# Patient Record
Sex: MALE | Race: WHITE | NOT HISPANIC OR LATINO | URBAN - METROPOLITAN AREA
[De-identification: names, ages, dates, MRNs, and addresses within clinical notes are randomized per-mention and may not be internally consistent; named-entity substitution may affect disease eponyms.]

---

## 2017-09-10 ENCOUNTER — EMERGENCY (EMERGENCY)
Facility: HOSPITAL | Age: 62
LOS: 1 days | Discharge: HOME | End: 2017-09-10
Attending: EMERGENCY MEDICINE | Admitting: EMERGENCY MEDICINE
Payer: COMMERCIAL

## 2017-09-10 VITALS
HEART RATE: 68 BPM | HEIGHT: 69 IN | WEIGHT: 199.96 LBS | OXYGEN SATURATION: 98 % | RESPIRATION RATE: 18 BRPM | SYSTOLIC BLOOD PRESSURE: 154 MMHG | DIASTOLIC BLOOD PRESSURE: 96 MMHG | TEMPERATURE: 99 F

## 2017-09-10 VITALS
RESPIRATION RATE: 16 BRPM | TEMPERATURE: 98 F | HEART RATE: 67 BPM | SYSTOLIC BLOOD PRESSURE: 161 MMHG | OXYGEN SATURATION: 96 % | DIASTOLIC BLOOD PRESSURE: 92 MMHG

## 2017-09-10 LAB
ANION GAP SERPL CALC-SCNC: 13 MMOL/L — SIGNIFICANT CHANGE UP (ref 5–17)
BUN SERPL-MCNC: 19 MG/DL — SIGNIFICANT CHANGE UP (ref 7–23)
CALCIUM SERPL-MCNC: 9.9 MG/DL — SIGNIFICANT CHANGE UP (ref 8.5–10.1)
CHLORIDE SERPL-SCNC: 110 MMOL/L — HIGH (ref 96–108)
CO2 SERPL-SCNC: 21 MMOL/L — LOW (ref 22–31)
CREAT SERPL-MCNC: 0.92 MG/DL — SIGNIFICANT CHANGE UP (ref 0.5–1.3)
GLUCOSE SERPL-MCNC: 110 MG/DL — HIGH (ref 70–99)
HCT VFR BLD CALC: 45.7 % — SIGNIFICANT CHANGE UP (ref 39–50)
HGB BLD-MCNC: 15.4 G/DL — SIGNIFICANT CHANGE UP (ref 13–17)
MCHC RBC-ENTMCNC: 29.9 PG — SIGNIFICANT CHANGE UP (ref 27–34)
MCHC RBC-ENTMCNC: 33.8 GM/DL — SIGNIFICANT CHANGE UP (ref 32–36)
MCV RBC AUTO: 88.4 FL — SIGNIFICANT CHANGE UP (ref 80–100)
PLATELET # BLD AUTO: 266 K/UL — SIGNIFICANT CHANGE UP (ref 150–400)
POTASSIUM SERPL-MCNC: 3.9 MMOL/L — SIGNIFICANT CHANGE UP (ref 3.5–5.3)
POTASSIUM SERPL-SCNC: 3.9 MMOL/L — SIGNIFICANT CHANGE UP (ref 3.5–5.3)
RBC # BLD: 5.17 M/UL — SIGNIFICANT CHANGE UP (ref 4.2–5.8)
RBC # FLD: 12.2 % — SIGNIFICANT CHANGE UP (ref 10.3–14.5)
SODIUM SERPL-SCNC: 144 MMOL/L — SIGNIFICANT CHANGE UP (ref 135–145)
WBC # BLD: 8.3 K/UL — SIGNIFICANT CHANGE UP (ref 3.8–10.5)
WBC # FLD AUTO: 8.3 K/UL — SIGNIFICANT CHANGE UP (ref 3.8–10.5)

## 2017-09-10 PROCEDURE — 93005 ELECTROCARDIOGRAM TRACING: CPT

## 2017-09-10 PROCEDURE — 96375 TX/PRO/DX INJ NEW DRUG ADDON: CPT

## 2017-09-10 PROCEDURE — 70450 CT HEAD/BRAIN W/O DYE: CPT

## 2017-09-10 PROCEDURE — 99285 EMERGENCY DEPT VISIT HI MDM: CPT

## 2017-09-10 PROCEDURE — 99284 EMERGENCY DEPT VISIT MOD MDM: CPT | Mod: 25

## 2017-09-10 PROCEDURE — 85027 COMPLETE CBC AUTOMATED: CPT

## 2017-09-10 PROCEDURE — 93010 ELECTROCARDIOGRAM REPORT: CPT

## 2017-09-10 PROCEDURE — 80048 BASIC METABOLIC PNL TOTAL CA: CPT

## 2017-09-10 PROCEDURE — 96376 TX/PRO/DX INJ SAME DRUG ADON: CPT

## 2017-09-10 PROCEDURE — 70450 CT HEAD/BRAIN W/O DYE: CPT | Mod: 26

## 2017-09-10 PROCEDURE — 96374 THER/PROPH/DIAG INJ IV PUSH: CPT

## 2017-09-10 RX ORDER — SODIUM CHLORIDE 9 MG/ML
1000 INJECTION INTRAMUSCULAR; INTRAVENOUS; SUBCUTANEOUS ONCE
Qty: 0 | Refills: 0 | Status: COMPLETED | OUTPATIENT
Start: 2017-09-10 | End: 2017-09-10

## 2017-09-10 RX ORDER — ONDANSETRON 8 MG/1
4 TABLET, FILM COATED ORAL ONCE
Qty: 0 | Refills: 0 | Status: COMPLETED | OUTPATIENT
Start: 2017-09-10 | End: 2017-09-10

## 2017-09-10 RX ORDER — FAMOTIDINE 10 MG/ML
20 INJECTION INTRAVENOUS ONCE
Qty: 0 | Refills: 0 | Status: COMPLETED | OUTPATIENT
Start: 2017-09-10 | End: 2017-09-10

## 2017-09-10 RX ADMIN — ONDANSETRON 4 MILLIGRAM(S): 8 TABLET, FILM COATED ORAL at 18:31

## 2017-09-10 RX ADMIN — ONDANSETRON 4 MILLIGRAM(S): 8 TABLET, FILM COATED ORAL at 20:06

## 2017-09-10 RX ADMIN — FAMOTIDINE 20 MILLIGRAM(S): 10 INJECTION INTRAVENOUS at 20:06

## 2017-09-10 RX ADMIN — SODIUM CHLORIDE 2000 MILLILITER(S): 9 INJECTION INTRAMUSCULAR; INTRAVENOUS; SUBCUTANEOUS at 20:06

## 2017-09-10 RX ADMIN — SODIUM CHLORIDE 2000 MILLILITER(S): 9 INJECTION INTRAMUSCULAR; INTRAVENOUS; SUBCUTANEOUS at 18:31

## 2017-09-10 NOTE — ED PROVIDER NOTE - CONSTITUTIONAL, MLM
normal... White male, well nourished, awake, alert, oriented to person, place, time/situation and in no apparent distress.

## 2017-09-10 NOTE — ED PROVIDER NOTE - OBJECTIVE STATEMENT
60 yo white male with sudden onset of dizziness and sweating while eating at diner table this evening. This was followed by syncope. Family placed patient on ground and patient awoke with nausea and vomiting x 1. No chest pain and no headache.

## 2017-09-13 DIAGNOSIS — R55 SYNCOPE AND COLLAPSE: ICD-10-CM

## 2017-09-13 DIAGNOSIS — E78.00 PURE HYPERCHOLESTEROLEMIA, UNSPECIFIED: ICD-10-CM

## 2017-09-22 ENCOUNTER — APPOINTMENT (OUTPATIENT)
Dept: CARDIOLOGY | Facility: CLINIC | Age: 62
End: 2017-09-22
Payer: COMMERCIAL

## 2017-09-22 PROCEDURE — 93880 EXTRACRANIAL BILAT STUDY: CPT

## 2017-09-25 ENCOUNTER — APPOINTMENT (OUTPATIENT)
Dept: CARDIOLOGY | Facility: CLINIC | Age: 62
End: 2017-09-25
Payer: COMMERCIAL

## 2017-09-25 PROCEDURE — 93306 TTE W/DOPPLER COMPLETE: CPT

## 2017-10-25 ENCOUNTER — APPOINTMENT (OUTPATIENT)
Dept: CARDIOLOGY | Facility: CLINIC | Age: 62
End: 2017-10-25
Payer: COMMERCIAL

## 2017-10-25 ENCOUNTER — NON-APPOINTMENT (OUTPATIENT)
Age: 62
End: 2017-10-25

## 2017-10-25 VITALS
SYSTOLIC BLOOD PRESSURE: 141 MMHG | DIASTOLIC BLOOD PRESSURE: 90 MMHG | BODY MASS INDEX: 28.88 KG/M2 | WEIGHT: 195 LBS | HEIGHT: 69 IN | OXYGEN SATURATION: 95 %

## 2017-10-25 VITALS — SYSTOLIC BLOOD PRESSURE: 150 MMHG | DIASTOLIC BLOOD PRESSURE: 90 MMHG

## 2017-10-25 DIAGNOSIS — J45.909 UNSPECIFIED ASTHMA, UNCOMPLICATED: ICD-10-CM

## 2017-10-25 PROCEDURE — 99215 OFFICE O/P EST HI 40 MIN: CPT

## 2017-10-25 PROCEDURE — 93000 ELECTROCARDIOGRAM COMPLETE: CPT

## 2017-12-12 ENCOUNTER — APPOINTMENT (OUTPATIENT)
Dept: CARDIOLOGY | Facility: CLINIC | Age: 62
End: 2017-12-12
Payer: COMMERCIAL

## 2017-12-12 PROCEDURE — 93015 CV STRESS TEST SUPVJ I&R: CPT

## 2017-12-20 ENCOUNTER — APPOINTMENT (OUTPATIENT)
Dept: CARDIOLOGY | Facility: CLINIC | Age: 62
End: 2017-12-20
Payer: COMMERCIAL

## 2017-12-20 PROCEDURE — 93268 ECG RECORD/REVIEW: CPT

## 2018-11-16 DIAGNOSIS — E78.00 PURE HYPERCHOLESTEROLEMIA, UNSPECIFIED: ICD-10-CM

## 2018-12-07 ENCOUNTER — RX RENEWAL (OUTPATIENT)
Age: 63
End: 2018-12-07

## 2018-12-20 ENCOUNTER — RECORD ABSTRACTING (OUTPATIENT)
Age: 63
End: 2018-12-20

## 2018-12-31 ENCOUNTER — APPOINTMENT (OUTPATIENT)
Dept: INTERNAL MEDICINE | Facility: CLINIC | Age: 63
End: 2018-12-31
Payer: COMMERCIAL

## 2018-12-31 ENCOUNTER — MED ADMIN CHARGE (OUTPATIENT)
Age: 63
End: 2018-12-31

## 2018-12-31 ENCOUNTER — NON-APPOINTMENT (OUTPATIENT)
Age: 63
End: 2018-12-31

## 2018-12-31 VITALS
HEART RATE: 73 BPM | HEIGHT: 69 IN | SYSTOLIC BLOOD PRESSURE: 150 MMHG | TEMPERATURE: 97.6 F | BODY MASS INDEX: 31.7 KG/M2 | DIASTOLIC BLOOD PRESSURE: 80 MMHG | WEIGHT: 214 LBS | OXYGEN SATURATION: 97 %

## 2018-12-31 DIAGNOSIS — K57.90 DIVERTICULOSIS OF INTESTINE, PART UNSPECIFIED, W/OUT PERFORATION OR ABSCESS W/OUT BLEEDING: ICD-10-CM

## 2018-12-31 DIAGNOSIS — Z87.438 PERSONAL HISTORY OF OTHER DISEASES OF MALE GENITAL ORGANS: ICD-10-CM

## 2018-12-31 DIAGNOSIS — Z83.511 FAMILY HISTORY OF GLAUCOMA: ICD-10-CM

## 2018-12-31 DIAGNOSIS — E04.1 NONTOXIC SINGLE THYROID NODULE: ICD-10-CM

## 2018-12-31 DIAGNOSIS — Z23 ENCOUNTER FOR IMMUNIZATION: ICD-10-CM

## 2018-12-31 DIAGNOSIS — K64.8 OTHER HEMORRHOIDS: ICD-10-CM

## 2018-12-31 DIAGNOSIS — Z87.898 PERSONAL HISTORY OF OTHER SPECIFIED CONDITIONS: ICD-10-CM

## 2018-12-31 DIAGNOSIS — Z78.9 OTHER SPECIFIED HEALTH STATUS: ICD-10-CM

## 2018-12-31 DIAGNOSIS — R09.89 OTHER SPECIFIED SYMPTOMS AND SIGNS INVOLVING THE CIRCULATORY AND RESPIRATORY SYSTEMS: ICD-10-CM

## 2018-12-31 DIAGNOSIS — Z80.1 FAMILY HISTORY OF MALIGNANT NEOPLASM OF TRACHEA, BRONCHUS AND LUNG: ICD-10-CM

## 2018-12-31 DIAGNOSIS — E53.8 DEFICIENCY OF OTHER SPECIFIED B GROUP VITAMINS: ICD-10-CM

## 2018-12-31 DIAGNOSIS — Z82.49 FAMILY HISTORY OF ISCHEMIC HEART DISEASE AND OTHER DISEASES OF THE CIRCULATORY SYSTEM: ICD-10-CM

## 2018-12-31 DIAGNOSIS — I65.29 OCCLUSION AND STENOSIS OF UNSPECIFIED CAROTID ARTERY: ICD-10-CM

## 2018-12-31 DIAGNOSIS — K63.5 POLYP OF COLON: ICD-10-CM

## 2018-12-31 DIAGNOSIS — R06.89 OTHER ABNORMALITIES OF BREATHING: ICD-10-CM

## 2018-12-31 PROCEDURE — 99397 PER PM REEVAL EST PAT 65+ YR: CPT | Mod: 25

## 2018-12-31 PROCEDURE — 93000 ELECTROCARDIOGRAM COMPLETE: CPT

## 2018-12-31 PROCEDURE — G0009: CPT

## 2018-12-31 PROCEDURE — 90670 PCV13 VACCINE IM: CPT

## 2018-12-31 PROCEDURE — 96372 THER/PROPH/DIAG INJ SC/IM: CPT

## 2018-12-31 RX ORDER — CYANOCOBALAMIN 1000 UG/ML
1000 INJECTION INTRAMUSCULAR; SUBCUTANEOUS
Qty: 0 | Refills: 0 | Status: COMPLETED | OUTPATIENT
Start: 2018-12-31

## 2018-12-31 RX ORDER — SERTRALINE HYDROCHLORIDE 25 MG/1
TABLET, FILM COATED ORAL
Refills: 0 | Status: COMPLETED | COMMUNITY
End: 2018-12-31

## 2018-12-31 RX ORDER — PNV NO.95/FERROUS FUM/FOLIC AC 28MG-0.8MG
TABLET ORAL
Refills: 0 | Status: ACTIVE | COMMUNITY

## 2018-12-31 RX ORDER — ATORVASTATIN CALCIUM 10 MG/1
10 TABLET, FILM COATED ORAL
Refills: 0 | Status: COMPLETED | COMMUNITY
End: 2018-12-31

## 2018-12-31 RX ORDER — UBIDECARENONE/VIT E ACET 100MG-5
CAPSULE ORAL
Refills: 0 | Status: ACTIVE | COMMUNITY

## 2018-12-31 RX ADMIN — CYANOCOBALAMIN 1 MCG/ML: 1000 INJECTION INTRAMUSCULAR; SUBCUTANEOUS at 00:00

## 2018-12-31 NOTE — HISTORY OF PRESENT ILLNESS
[FreeTextEntry1] : Pt 62 y/o present for an annual PE visit. [de-identified] : Pt presents to the office today for CPE and FBW\par Pt is feeling well with no complaints at present time\par He has been poor with diet and exercise\par He is retiring today \par

## 2018-12-31 NOTE — PLAN
[FreeTextEntry1] : reviewed FBW with pt in depth.\par Elevated PSA from 2.8 to 4.8 will follow up with his urologist\par Discussed diet and exercise as well as weight loss that is needed\par B12 is low and will have b12 inj today then start OTC b12 1000mcg daily\par Vitamin D is low start 2000 IU daily or 10,000 to 15,000 IU once weekly\par follow up again with me in 6 months time.

## 2019-03-05 ENCOUNTER — RX RENEWAL (OUTPATIENT)
Age: 64
End: 2019-03-05

## 2019-05-20 ENCOUNTER — RX RENEWAL (OUTPATIENT)
Age: 64
End: 2019-05-20

## 2019-12-20 ENCOUNTER — APPOINTMENT (OUTPATIENT)
Dept: INTERNAL MEDICINE | Facility: CLINIC | Age: 64
End: 2019-12-20
Payer: COMMERCIAL

## 2019-12-20 ENCOUNTER — NON-APPOINTMENT (OUTPATIENT)
Age: 64
End: 2019-12-20

## 2019-12-20 VITALS
HEART RATE: 76 BPM | OXYGEN SATURATION: 96 % | SYSTOLIC BLOOD PRESSURE: 148 MMHG | WEIGHT: 217 LBS | TEMPERATURE: 97.6 F | DIASTOLIC BLOOD PRESSURE: 82 MMHG | BODY MASS INDEX: 32.14 KG/M2 | HEIGHT: 69 IN

## 2019-12-20 PROCEDURE — 36415 COLL VENOUS BLD VENIPUNCTURE: CPT

## 2019-12-20 PROCEDURE — 93000 ELECTROCARDIOGRAM COMPLETE: CPT

## 2019-12-20 PROCEDURE — 99397 PER PM REEVAL EST PAT 65+ YR: CPT | Mod: 25

## 2019-12-20 RX ORDER — ATORVASTATIN CALCIUM 10 MG/1
10 TABLET, FILM COATED ORAL
Qty: 90 | Refills: 0 | Status: COMPLETED | COMMUNITY
Start: 2018-12-07 | End: 2019-12-20

## 2019-12-20 NOTE — PHYSICAL EXAM
[No Acute Distress] : no acute distress [Well Nourished] : well nourished [PERRL] : pupils equal round and reactive to light [Well-Appearing] : well-appearing [Well Developed] : well developed [Normal Sclera/Conjunctiva] : normal sclera/conjunctiva [Normal Outer Ear/Nose] : the outer ears and nose were normal in appearance [EOMI] : extraocular movements intact [No JVD] : no jugular venous distention [Normal Oropharynx] : the oropharynx was normal [No Lymphadenopathy] : no lymphadenopathy [Supple] : supple [No Respiratory Distress] : no respiratory distress  [Thyroid Normal, No Nodules] : the thyroid was normal and there were no nodules present [Normal Rate] : normal rate  [Clear to Auscultation] : lungs were clear to auscultation bilaterally [No Accessory Muscle Use] : no accessory muscle use [No Murmur] : no murmur heard [Regular Rhythm] : with a regular rhythm [Normal S1, S2] : normal S1 and S2 [No Abdominal Bruit] : a ~M bruit was not heard ~T in the abdomen [No Varicosities] : no varicosities [No Carotid Bruits] : no carotid bruits [No Extremity Clubbing/Cyanosis] : no extremity clubbing/cyanosis [No Palpable Aorta] : no palpable aorta [No Edema] : there was no peripheral edema [Pedal Pulses Present] : the pedal pulses are present [Soft] : abdomen soft [Non-distended] : non-distended [Non Tender] : non-tender [No Masses] : no abdominal mass palpated [Normal Bowel Sounds] : normal bowel sounds [No HSM] : no HSM [Normal Posterior Cervical Nodes] : no posterior cervical lymphadenopathy [No CVA Tenderness] : no CVA  tenderness [Normal Anterior Cervical Nodes] : no anterior cervical lymphadenopathy [No Joint Swelling] : no joint swelling [No Spinal Tenderness] : no spinal tenderness [Grossly Normal Strength/Tone] : grossly normal strength/tone [No Focal Deficits] : no focal deficits [No Rash] : no rash [Coordination Grossly Intact] : coordination grossly intact [Deep Tendon Reflexes (DTR)] : deep tendon reflexes were 2+ and symmetric [Normal Gait] : normal gait [Normal Affect] : the affect was normal [Normal Insight/Judgement] : insight and judgment were intact [de-identified] : sees urology

## 2019-12-20 NOTE — HISTORY OF PRESENT ILLNESS
[FreeTextEntry1] : 64 y/o male present for a physical exam.  [de-identified] : Pt presents today for CPE and FBW\par He has been feeling well overall\par he has not been taking his Lipitor as he states he gets mild muscle aches\par He has been under increased stressors and doesn’t feel as if the Zoloft is helping as much\par he has seen the urologist within the year but is looking for new urologist \par Colonoscopy up to date \par skin cancer screen done 7/19

## 2019-12-20 NOTE — PLAN
[FreeTextEntry1] : FBW done in office today\par EKG NSR @ 74 BPM \par Pt will try new statin therapy as previous on cause muscle cramps.\par He will increase Zoloft to 100 mg daily to see if this helps further with anxiety\par Improvement with diet and exercise needed as it has been poor for several months

## 2019-12-30 ENCOUNTER — FORM ENCOUNTER (OUTPATIENT)
Age: 64
End: 2019-12-30

## 2019-12-31 ENCOUNTER — OUTPATIENT (OUTPATIENT)
Dept: OUTPATIENT SERVICES | Facility: HOSPITAL | Age: 64
LOS: 1 days | End: 2019-12-31
Payer: COMMERCIAL

## 2019-12-31 ENCOUNTER — APPOINTMENT (OUTPATIENT)
Dept: RADIOLOGY | Facility: CLINIC | Age: 64
End: 2019-12-31
Payer: COMMERCIAL

## 2019-12-31 DIAGNOSIS — Z00.8 ENCOUNTER FOR OTHER GENERAL EXAMINATION: ICD-10-CM

## 2019-12-31 PROCEDURE — 71046 X-RAY EXAM CHEST 2 VIEWS: CPT | Mod: 26

## 2019-12-31 PROCEDURE — 71046 X-RAY EXAM CHEST 2 VIEWS: CPT

## 2020-01-03 LAB
25(OH)D3 SERPL-MCNC: 23.8 NG/ML
ALBUMIN SERPL ELPH-MCNC: 4.8 G/DL
ALP BLD-CCNC: 88 U/L
ALT SERPL-CCNC: 37 U/L
ANION GAP SERPL CALC-SCNC: 18 MMOL/L
APPEARANCE: CLEAR
AST SERPL-CCNC: 25 U/L
BACTERIA: NEGATIVE
BASOPHILS # BLD AUTO: 0.05 K/UL
BASOPHILS NFR BLD AUTO: 0.7 %
BILIRUB SERPL-MCNC: 0.6 MG/DL
BILIRUBIN URINE: NEGATIVE
BLOOD URINE: NEGATIVE
BUN SERPL-MCNC: 22 MG/DL
CALCIUM SERPL-MCNC: 9.8 MG/DL
CHLORIDE SERPL-SCNC: 108 MMOL/L
CHOLEST SERPL-MCNC: 264 MG/DL
CHOLEST/HDLC SERPL: 4.4 RATIO
CO2 SERPL-SCNC: 16 MMOL/L
COLOR: YELLOW
CREAT SERPL-MCNC: 0.91 MG/DL
EOSINOPHIL # BLD AUTO: 0.18 K/UL
EOSINOPHIL NFR BLD AUTO: 2.4 %
ESTIMATED AVERAGE GLUCOSE: 123 MG/DL
GLUCOSE QUALITATIVE U: NEGATIVE
GLUCOSE SERPL-MCNC: 115 MG/DL
HBA1C MFR BLD HPLC: 5.9 %
HCT VFR BLD CALC: 48.4 %
HDLC SERPL-MCNC: 60 MG/DL
HGB BLD-MCNC: 15.5 G/DL
HYALINE CASTS: 0 /LPF
IMM GRANULOCYTES NFR BLD AUTO: 0.7 %
KETONES URINE: NEGATIVE
LDLC SERPL CALC-MCNC: 157 MG/DL
LEUKOCYTE ESTERASE URINE: NEGATIVE
LYMPHOCYTES # BLD AUTO: 1.5 K/UL
LYMPHOCYTES NFR BLD AUTO: 20 %
MAN DIFF?: NORMAL
MCHC RBC-ENTMCNC: 28.9 PG
MCHC RBC-ENTMCNC: 32 GM/DL
MCV RBC AUTO: 90.3 FL
MICROSCOPIC-UA: NORMAL
MONOCYTES # BLD AUTO: 0.7 K/UL
MONOCYTES NFR BLD AUTO: 9.3 %
NEUTROPHILS # BLD AUTO: 5.01 K/UL
NEUTROPHILS NFR BLD AUTO: 66.9 %
NITRITE URINE: NEGATIVE
PH URINE: 5.5
PLATELET # BLD AUTO: 249 K/UL
POTASSIUM SERPL-SCNC: 4.5 MMOL/L
PROT SERPL-MCNC: 7.4 G/DL
PROTEIN URINE: NEGATIVE
PSA FREE FLD-MCNC: 10 %
PSA FREE SERPL-MCNC: 0.53 NG/ML
PSA SERPL-MCNC: 5.15 NG/ML
RBC # BLD: 5.36 M/UL
RBC # FLD: 13.5 %
RED BLOOD CELLS URINE: 1 /HPF
SODIUM SERPL-SCNC: 142 MMOL/L
SPECIFIC GRAVITY URINE: 1.03
SQUAMOUS EPITHELIAL CELLS: 1 /HPF
TRIGL SERPL-MCNC: 237 MG/DL
TSH SERPL-ACNC: 2.46 UIU/ML
UROBILINOGEN URINE: NORMAL
VIT B12 SERPL-MCNC: 307 PG/ML
WBC # FLD AUTO: 7.49 K/UL
WHITE BLOOD CELLS URINE: 1 /HPF

## 2020-02-18 ENCOUNTER — APPOINTMENT (OUTPATIENT)
Dept: MRI IMAGING | Facility: IMAGING CENTER | Age: 65
End: 2020-02-18
Payer: COMMERCIAL

## 2020-02-18 ENCOUNTER — OUTPATIENT (OUTPATIENT)
Dept: OUTPATIENT SERVICES | Facility: HOSPITAL | Age: 65
LOS: 1 days | End: 2020-02-18
Payer: COMMERCIAL

## 2020-02-18 DIAGNOSIS — R97.20 ELEVATED PROSTATE SPECIFIC ANTIGEN [PSA]: ICD-10-CM

## 2020-02-18 PROCEDURE — 72197 MRI PELVIS W/O & W/DYE: CPT | Mod: 26

## 2020-02-18 PROCEDURE — A9585: CPT

## 2020-02-18 PROCEDURE — 72197 MRI PELVIS W/O & W/DYE: CPT

## 2020-03-12 ENCOUNTER — APPOINTMENT (OUTPATIENT)
Dept: UROLOGY | Facility: CLINIC | Age: 65
End: 2020-03-12
Payer: COMMERCIAL

## 2020-03-12 VITALS
HEART RATE: 68 BPM | TEMPERATURE: 97.6 F | RESPIRATION RATE: 16 BRPM | DIASTOLIC BLOOD PRESSURE: 95 MMHG | OXYGEN SATURATION: 95 % | SYSTOLIC BLOOD PRESSURE: 142 MMHG | BODY MASS INDEX: 31.1 KG/M2 | WEIGHT: 210 LBS | HEIGHT: 69 IN

## 2020-03-12 DIAGNOSIS — N52.9 MALE ERECTILE DYSFUNCTION, UNSPECIFIED: ICD-10-CM

## 2020-03-12 PROCEDURE — 99203 OFFICE O/P NEW LOW 30 MIN: CPT

## 2020-03-12 RX ORDER — ALBUTEROL SULFATE 90 UG/1
INHALANT RESPIRATORY (INHALATION)
Refills: 0 | Status: ACTIVE | COMMUNITY

## 2020-03-12 RX ORDER — ALBUTEROL SULFATE 90 UG/1
108 (90 BASE) AEROSOL, METERED RESPIRATORY (INHALATION)
Refills: 0 | Status: DISCONTINUED | COMMUNITY
End: 2020-03-12

## 2020-03-12 RX ORDER — DOXAZOSIN 4 MG/1
4 TABLET ORAL
Refills: 0 | Status: DISCONTINUED | COMMUNITY
End: 2020-03-12

## 2020-03-12 NOTE — PHYSICAL EXAM
[General Appearance - Well Developed] : well developed [General Appearance - Well Nourished] : well nourished [Normal Appearance] : normal appearance [Well Groomed] : well groomed [General Appearance - In No Acute Distress] : no acute distress [Edema] : no peripheral edema [Respiration, Rhythm And Depth] : normal respiratory rhythm and effort [Exaggerated Use Of Accessory Muscles For Inspiration] : no accessory muscle use [Abdomen Soft] : soft [Abdomen Tenderness] : non-tender [Costovertebral Angle Tenderness] : no ~M costovertebral angle tenderness [Urethral Meatus] : meatus normal [Urinary Bladder Findings] : the bladder was normal on palpation [Scrotum] : the scrotum was normal [Testes Tenderness] : no tenderness of the testes [Testes Mass (___cm)] : there were no testicular masses [Prostate Tenderness] : the prostate was not tender [No Prostate Nodules] : no prostate nodules [FreeTextEntry1] : Prostate moderately enlarged. [Normal Station and Gait] : the gait and station were normal for the patient's age [] : no rash [No Focal Deficits] : no focal deficits [Oriented To Time, Place, And Person] : oriented to person, place, and time [Affect] : the affect was normal [Mood] : the mood was normal [Not Anxious] : not anxious [Inguinal Lymph Nodes Enlarged Bilaterally] : inguinal

## 2020-03-12 NOTE — HISTORY OF PRESENT ILLNESS
[FreeTextEntry1] : He is a 64-year-old man who is seen today for initial visit. His previous urologist no longer takes his insurance. He has history of urinary frequency, and nocturia. He used to be on doxazosin 1 mg because higher doses gave him dizziness. He is no longer taking it. Nocturia is 2 or 3 times. He might have sleep apnea but has not been checked recently. He has history of elevated PSA level. He has never had a prostate biopsy. In February 2020, PSA level was 6.29, free PSA 12% and the 4K. score was 24%. An MRI was done in February 2020 which showed prostate volume 45 cc and the left peripheral zone base lesion 8mm with suspicious score 4, small bilateral inguinal hernias. There is no family history of prostate cancer. His father had testicular cancer. Residual urine today was 50 cc. IPSS was 19 in the past. He has ED but not sexually active for a while.

## 2020-03-12 NOTE — REVIEW OF SYSTEMS
[Wake up at night to urinate  How many times?  ___] : wakes up to urinate [unfilled] times during the night [Slow urine stream] : slow urine stream [Interrupted urine stream] : interrupted urine stream [Anxiety] : anxiety [Depression] : depression [Negative] : Heme/Lymph [FreeTextEntry2] : frequent urination at daytime every 3-5 hrs as per pt.

## 2020-03-12 NOTE — ASSESSMENT
[FreeTextEntry1] : I think he should be checked for sleep apnea in order to improve nocturia if he needs to be on CPAP. We had a long discussion regarding his PSA level. We discussed different PSA parameters such as PSA velocity, free PSA and age-adjusted PSA level. Other markers such as 4K score, PCA3, Prostate health Index, etc were reviewed.  We also discussed observation with repeat PSA level, prostate or pelvic MRI and prostate biopsy in detail. Risks and benefits of each option were discussed and questions were answered. Patient was made aware that prostate cancer can exist at any PSA level.\par Prostate biopsy was reviewed in detail. We discussed how the procedure is performed. Preparation with oral antibiotics and Fleet enema was reviewed. Risks of biopsy especially bacteremia, UTI, sepsis, bleeding, erectile dysfunction, etc were discussed. He was made aware that biopsy may not be able to diagnose prostate cancer. Questions were answered. He will be scheduled with one of my colleagues for transperineal MRI guided fusion prostate biopsy.

## 2020-04-13 ENCOUNTER — APPOINTMENT (OUTPATIENT)
Dept: UROLOGY | Facility: CLINIC | Age: 65
End: 2020-04-13

## 2020-05-11 ENCOUNTER — LABORATORY RESULT (OUTPATIENT)
Age: 65
End: 2020-05-11

## 2020-05-11 ENCOUNTER — APPOINTMENT (OUTPATIENT)
Dept: UROLOGY | Facility: CLINIC | Age: 65
End: 2020-05-11
Payer: COMMERCIAL

## 2020-05-11 ENCOUNTER — OUTPATIENT (OUTPATIENT)
Dept: OUTPATIENT SERVICES | Facility: HOSPITAL | Age: 65
LOS: 1 days | End: 2020-05-11
Payer: COMMERCIAL

## 2020-05-11 VITALS — DIASTOLIC BLOOD PRESSURE: 95 MMHG | SYSTOLIC BLOOD PRESSURE: 179 MMHG | HEART RATE: 77 BPM

## 2020-05-11 VITALS
RESPIRATION RATE: 16 BRPM | SYSTOLIC BLOOD PRESSURE: 145 MMHG | TEMPERATURE: 97.8 F | DIASTOLIC BLOOD PRESSURE: 86 MMHG | HEART RATE: 74 BPM

## 2020-05-11 DIAGNOSIS — R35.0 FREQUENCY OF MICTURITION: ICD-10-CM

## 2020-05-11 PROCEDURE — 76942 ECHO GUIDE FOR BIOPSY: CPT | Mod: 26,59

## 2020-05-11 PROCEDURE — 55700: CPT | Mod: 22

## 2020-05-11 PROCEDURE — 76872 US TRANSRECTAL: CPT

## 2020-05-11 PROCEDURE — 76872 US TRANSRECTAL: CPT | Mod: 26

## 2020-05-11 PROCEDURE — 76942 ECHO GUIDE FOR BIOPSY: CPT | Mod: 59

## 2020-05-11 PROCEDURE — 76377 3D RENDER W/INTRP POSTPROCES: CPT | Mod: 26

## 2020-05-11 PROCEDURE — 55700: CPT

## 2020-05-14 DIAGNOSIS — R97.20 ELEVATED PROSTATE SPECIFIC ANTIGEN [PSA]: ICD-10-CM

## 2020-05-19 ENCOUNTER — APPOINTMENT (OUTPATIENT)
Dept: UROLOGY | Facility: CLINIC | Age: 65
End: 2020-05-19
Payer: COMMERCIAL

## 2020-05-19 PROCEDURE — 99214 OFFICE O/P EST MOD 30 MIN: CPT | Mod: 95

## 2020-05-19 RX ORDER — DIAZEPAM 5 MG/1
5 TABLET ORAL
Qty: 1 | Refills: 0 | Status: DISCONTINUED | COMMUNITY
Start: 2020-05-04 | End: 2020-05-19

## 2020-05-19 NOTE — ASSESSMENT
[FreeTextEntry1] : We discussed prostate cancer grading and staging system. Risk categories were discussed. Treatment options discussed included active surveillance, surgery, brachytherapy, external beam radiation therapy, Cyberknife, hormonal therapy, cryotherapy, chemotherapy, etc. Radical retropubic versus laparoscopic or robotic approaches were discussed. Postoperative care was reviewed. Risks of surgery discussed included but were not limited to bleeding, infection, injury to surrounding organs, DVT, PE, transfusion, etc. Risks of stress incontinence, bladder neck stricture, penile shortening and ED discussed. Possibility of positive margins, recurrence of prostate cancer and the need for additional treatment options such as radiation or hormonal therapy was reviewed. In case of active surveillance, follow-up schedule with PSA levels, repeat biopsy and possibly pelvic MRI was reviewed.\par Different types of radiation treatment as indicated above were discussed. Effects of radiation on the surrounding organs, urinary symptoms, ED, secondary malignancies, etc. reviewed as well. Depending on pathology report, further staging can be performed with CT and bone scans. Patient's questions were answered. If he decides to pursue treatment with our institution, he will followup shortly after deciding on which treatment option to proceed with. Obtaining a second opinion was highly recommended. CT and bone scans will be ordered for metastatic workup pending insurance approval. He will call back to schedule appointment after scans are done.

## 2020-05-19 NOTE — PHYSICAL EXAM
[General Appearance - Well Developed] : well developed [General Appearance - Well Nourished] : well nourished [Normal Appearance] : normal appearance [Well Groomed] : well groomed [General Appearance - In No Acute Distress] : no acute distress [Urethral Meatus] : meatus normal [Urinary Bladder Findings] : the bladder was normal on palpation [Scrotum] : the scrotum was normal [Testes Tenderness] : no tenderness of the testes [Testes Mass (___cm)] : there were no testicular masses [Prostate Tenderness] : the prostate was not tender [No Prostate Nodules] : no prostate nodules [FreeTextEntry1] : Prostate moderately enlarged. VIDYA done 3/2020 [Oriented To Time, Place, And Person] : oriented to person, place, and time [Affect] : the affect was normal [Mood] : the mood was normal [Not Anxious] : not anxious

## 2020-05-19 NOTE — HISTORY OF PRESENT ILLNESS
[Home] : at home, [unfilled] , at the time of the visit. [Medical Office: ([facility])___] : at the medical office located in  [Spouse] : spouse [Patient] : the patient [Self] : self [FreeTextEntry1] : He is a 64-year-old man who is seen today (via telehealth) after transperineal fusion prostate biopsy on 5/11/2020. He recovered well from the biopsy. He is here to discuss the results of the biopsy. PSA level was 6.29. Biopsy showed 2 cores [place] 3+4 from target area, one core [place] 4+3 from the right posterior base and also intraductal carcinoma. He does not have any fever or dysuria.\par \par Previous notes: His previous urologist no longer takes his insurance. He has history of urinary frequency, and nocturia. He used to be on doxazosin 1 mg because higher doses gave him dizziness. He is no longer taking it. Nocturia is 2 or 3 times. He might have sleep apnea but has not been checked recently. He has history of elevated PSA level. He has never had a prostate biopsy. In February 2020, PSA level was 6.29, free PSA 12% and the 4K. score was 24%. An MRI was done in February 2020 which showed prostate volume 45 cc and the left peripheral zone base lesion 8mm with suspicious score 4, small bilateral inguinal hernias. There is no family history of prostate cancer. His father had testicular cancer. Residual urine was 50 cc. IPSS was 19 in the past. He has ED but not sexually active for a while.

## 2020-05-22 ENCOUNTER — RESULT REVIEW (OUTPATIENT)
Age: 65
End: 2020-05-22

## 2020-05-22 ENCOUNTER — APPOINTMENT (OUTPATIENT)
Dept: CT IMAGING | Facility: IMAGING CENTER | Age: 65
End: 2020-05-22
Payer: COMMERCIAL

## 2020-05-22 ENCOUNTER — OUTPATIENT (OUTPATIENT)
Dept: OUTPATIENT SERVICES | Facility: HOSPITAL | Age: 65
LOS: 1 days | End: 2020-05-22
Payer: COMMERCIAL

## 2020-05-22 DIAGNOSIS — C61 MALIGNANT NEOPLASM OF PROSTATE: ICD-10-CM

## 2020-05-22 PROCEDURE — 74177 CT ABD & PELVIS W/CONTRAST: CPT | Mod: 26

## 2020-05-22 PROCEDURE — 74177 CT ABD & PELVIS W/CONTRAST: CPT

## 2020-06-04 ENCOUNTER — OUTPATIENT (OUTPATIENT)
Dept: OUTPATIENT SERVICES | Facility: HOSPITAL | Age: 65
LOS: 1 days | End: 2020-06-04
Payer: COMMERCIAL

## 2020-06-04 ENCOUNTER — APPOINTMENT (OUTPATIENT)
Dept: NUCLEAR MEDICINE | Facility: IMAGING CENTER | Age: 65
End: 2020-06-04
Payer: COMMERCIAL

## 2020-06-04 DIAGNOSIS — C61 MALIGNANT NEOPLASM OF PROSTATE: ICD-10-CM

## 2020-06-04 PROCEDURE — 78306 BONE IMAGING WHOLE BODY: CPT | Mod: 26

## 2020-06-04 PROCEDURE — A9561: CPT

## 2020-06-04 PROCEDURE — 78306 BONE IMAGING WHOLE BODY: CPT

## 2020-06-09 ENCOUNTER — APPOINTMENT (OUTPATIENT)
Dept: UROLOGY | Facility: CLINIC | Age: 65
End: 2020-06-09
Payer: COMMERCIAL

## 2020-06-09 DIAGNOSIS — R97.20 ELEVATED PROSTATE, SPECIFIC ANTIGEN [PSA]: ICD-10-CM

## 2020-06-09 PROCEDURE — 99214 OFFICE O/P EST MOD 30 MIN: CPT | Mod: 95

## 2020-06-09 NOTE — HISTORY OF PRESENT ILLNESS
[Home] : at home, [unfilled] , at the time of the visit. [Medical Office: ([facility])___] : at the medical office located in  [Spouse] : spouse [Verbal consent obtained from patient] : the patient, [unfilled] [FreeTextEntry1] : He is a 64-year-old man who is seen today (via telehealth) with wife after transperineal fusion prostate biopsy on 5/11/2020. CT scan was normal and bone scan showed degenerative joint disease. PSA level was 6.29. Biopsy showed 2 cores [place] 3+4 from target area, one core [place] 4+3 from the right posterior base and also intraductal carcinoma. He is voiding well, no bone or flank pain. \par \par Previous notes: His previous urologist no longer takes his insurance. He has history of urinary frequency, and nocturia. He used to be on doxazosin 1 mg because higher doses gave him dizziness. He is no longer taking it. Nocturia is 2 or 3 times. He might have sleep apnea but has not been checked recently. He has history of elevated PSA level. He has never had a prostate biopsy. In February 2020, PSA level was 6.29, free PSA 12% and the 4K. score was 24%. An MRI was done in February 2020 which showed prostate volume 45 cc and the left peripheral zone base lesion 8mm with suspicious score 4, small bilateral inguinal hernias. There is no family history of prostate cancer. His father had testicular cancer. Residual urine was 50 cc. IPSS was 19 in the past. He has ED but not sexually active for a while.

## 2020-06-09 NOTE — ASSESSMENT
[FreeTextEntry1] : He is interested in robotic surgery. CT and bone scans did not show any evidence of obvious metastasis. Pathology report has been reviewed again. At the previous visit and today we reviewed risks of surgery including but not limited to bleeding, infection, injury to surrounding organs such as the rectum, erectile dysfunction, incontinence, requiring radiation therapy, stricture formation, etc. He has reviewed his options and would like to proceed. I will have him see one of my partners to schedule robotic radical prostatectomy. Questions and concerns were discussed.

## 2020-06-09 NOTE — PHYSICAL EXAM
[General Appearance - Well Developed] : well developed [Normal Appearance] : normal appearance [General Appearance - Well Nourished] : well nourished [Well Groomed] : well groomed [General Appearance - In No Acute Distress] : no acute distress [Urethral Meatus] : meatus normal [Urinary Bladder Findings] : the bladder was normal on palpation [Scrotum] : the scrotum was normal [Testes Tenderness] : no tenderness of the testes [Testes Mass (___cm)] : there were no testicular masses [Prostate Tenderness] : the prostate was not tender [No Prostate Nodules] : no prostate nodules [FreeTextEntry1] : Genital exam and VIDYA done 3/2020. [] : no respiratory distress [Oriented To Time, Place, And Person] : oriented to person, place, and time [Affect] : the affect was normal [Mood] : the mood was normal [Not Anxious] : not anxious

## 2020-06-10 ENCOUNTER — APPOINTMENT (OUTPATIENT)
Dept: UROLOGY | Facility: CLINIC | Age: 65
End: 2020-06-10
Payer: COMMERCIAL

## 2020-06-10 VITALS
SYSTOLIC BLOOD PRESSURE: 136 MMHG | HEIGHT: 69 IN | OXYGEN SATURATION: 97 % | DIASTOLIC BLOOD PRESSURE: 87 MMHG | WEIGHT: 213 LBS | BODY MASS INDEX: 31.55 KG/M2 | HEART RATE: 78 BPM | TEMPERATURE: 96.8 F

## 2020-06-10 PROCEDURE — 99214 OFFICE O/P EST MOD 30 MIN: CPT

## 2020-06-12 NOTE — ASSESSMENT
[FreeTextEntry1] : discussed the results of the biopsy with the patient, explained the jennifer classification, low, intermediate, and high risk prostate cancer. and then explained the importance of the intraductal finding and how this is treated as high risk prostate cancer. explained what clinically localized prostate cancer is and the treatment options.\par discussed radiation therapy, including IMRT and cyberknife, and radical prostatectomy. discussed the benefits and risks of each modality. discussed robotic radical prostatectomy, explained the procedure, in hospital stay and recovery, discussed potential complications including bleeding, hematuria, injury to adjacent organs, incontinence and erectile dysfunction.\par \par patient understand and is interested in the procedure. signed the consent form and will be scheduled for surgery.\par

## 2020-06-12 NOTE — PHYSICAL EXAM
[General Appearance - Well Developed] : well developed [General Appearance - Well Nourished] : well nourished [Normal Appearance] : normal appearance [Well Groomed] : well groomed [General Appearance - In No Acute Distress] : no acute distress [Exaggerated Use Of Accessory Muscles For Inspiration] : no accessory muscle use [Normal Station and Gait] : the gait and station were normal for the patient's age [Skin Color & Pigmentation] : normal skin color and pigmentation [Oriented To Time, Place, And Person] : oriented to person, place, and time [Affect] : the affect was normal [Mood] : the mood was normal [Not Anxious] : not anxious [Edema] : no peripheral edema [Respiration, Rhythm And Depth] : normal respiratory rhythm and effort [Abdomen Soft] : soft [Abdomen Tenderness] : non-tender [Costovertebral Angle Tenderness] : no ~M costovertebral angle tenderness [Urethral Meatus] : meatus normal [Urinary Bladder Findings] : the bladder was normal on palpation [Scrotum] : the scrotum was normal [Testes Mass (___cm)] : there were no testicular masses [No Prostate Nodules] : no prostate nodules [] : no rash [No Focal Deficits] : no focal deficits [No Palpable Adenopathy] : no palpable adenopathy [FreeTextEntry1] : obese abdomen

## 2020-06-12 NOTE — HISTORY OF PRESENT ILLNESS
[None] : None [FreeTextEntry1] : 63 yo male patient recently diagnosed with prostate cancer\par seen by Dr Landry and sent for consultation and plan of treatment\par \par PSA 6.29 (2/2020)\par 4k score is 24%\par MRI: 45cc, 8mm PIRADS 4 lesion on the LT\par 5/11/2020 fusion BX:\par - 2 cores positive from targeted lesion with jennifer 3+4=7\par - 1 core positive on the RT base jennifer 4+3=7 with intraductal carcinoma\par \par patient has already discussed treatment options and is interested in radical prostatectomy

## 2020-06-16 ENCOUNTER — OUTPATIENT (OUTPATIENT)
Dept: OUTPATIENT SERVICES | Facility: HOSPITAL | Age: 65
LOS: 1 days | End: 2020-06-16
Payer: COMMERCIAL

## 2020-06-16 ENCOUNTER — RESULT REVIEW (OUTPATIENT)
Age: 65
End: 2020-06-16

## 2020-06-16 ENCOUNTER — APPOINTMENT (OUTPATIENT)
Dept: INTERNAL MEDICINE | Facility: CLINIC | Age: 65
End: 2020-06-16

## 2020-06-16 DIAGNOSIS — C61 MALIGNANT NEOPLASM OF PROSTATE: ICD-10-CM

## 2020-06-16 PROCEDURE — 88321 CONSLTJ&REPRT SLD PREP ELSWR: CPT

## 2020-06-17 DIAGNOSIS — C61 MALIGNANT NEOPLASM OF PROSTATE: ICD-10-CM

## 2020-06-23 ENCOUNTER — RESULT REVIEW (OUTPATIENT)
Age: 65
End: 2020-06-23

## 2020-06-23 ENCOUNTER — OUTPATIENT (OUTPATIENT)
Dept: OUTPATIENT SERVICES | Facility: HOSPITAL | Age: 65
LOS: 1 days | End: 2020-06-23
Payer: COMMERCIAL

## 2020-06-23 VITALS
HEART RATE: 65 BPM | DIASTOLIC BLOOD PRESSURE: 88 MMHG | RESPIRATION RATE: 16 BRPM | TEMPERATURE: 98 F | HEIGHT: 69 IN | SYSTOLIC BLOOD PRESSURE: 140 MMHG | OXYGEN SATURATION: 96 % | WEIGHT: 212.97 LBS

## 2020-06-23 DIAGNOSIS — Z98.890 OTHER SPECIFIED POSTPROCEDURAL STATES: Chronic | ICD-10-CM

## 2020-06-23 DIAGNOSIS — Z01.818 ENCOUNTER FOR OTHER PREPROCEDURAL EXAMINATION: ICD-10-CM

## 2020-06-23 DIAGNOSIS — C61 MALIGNANT NEOPLASM OF PROSTATE: ICD-10-CM

## 2020-06-23 LAB
ANION GAP SERPL CALC-SCNC: 4 MMOL/L — LOW (ref 5–17)
APPEARANCE UR: CLEAR — SIGNIFICANT CHANGE UP
APTT BLD: 31.2 SEC — SIGNIFICANT CHANGE UP (ref 27.5–36.3)
BASOPHILS # BLD AUTO: 0.03 K/UL — SIGNIFICANT CHANGE UP (ref 0–0.2)
BASOPHILS NFR BLD AUTO: 0.5 % — SIGNIFICANT CHANGE UP (ref 0–2)
BILIRUB UR-MCNC: NEGATIVE — SIGNIFICANT CHANGE UP
BUN SERPL-MCNC: 23 MG/DL — SIGNIFICANT CHANGE UP (ref 7–23)
CALCIUM SERPL-MCNC: 9.6 MG/DL — SIGNIFICANT CHANGE UP (ref 8.5–10.1)
CHLORIDE SERPL-SCNC: 110 MMOL/L — HIGH (ref 96–108)
CO2 SERPL-SCNC: 24 MMOL/L — SIGNIFICANT CHANGE UP (ref 22–31)
COLOR SPEC: YELLOW — SIGNIFICANT CHANGE UP
CREAT SERPL-MCNC: 0.88 MG/DL — SIGNIFICANT CHANGE UP (ref 0.5–1.3)
DIFF PNL FLD: NEGATIVE — SIGNIFICANT CHANGE UP
EOSINOPHIL # BLD AUTO: 0.17 K/UL — SIGNIFICANT CHANGE UP (ref 0–0.5)
EOSINOPHIL NFR BLD AUTO: 2.6 % — SIGNIFICANT CHANGE UP (ref 0–6)
GLUCOSE SERPL-MCNC: 90 MG/DL — SIGNIFICANT CHANGE UP (ref 70–99)
GLUCOSE UR QL: NEGATIVE MG/DL — SIGNIFICANT CHANGE UP
HCT VFR BLD CALC: 45.4 % — SIGNIFICANT CHANGE UP (ref 39–50)
HGB BLD-MCNC: 14.9 G/DL — SIGNIFICANT CHANGE UP (ref 13–17)
IMM GRANULOCYTES NFR BLD AUTO: 0.6 % — SIGNIFICANT CHANGE UP (ref 0–1.5)
INR BLD: 1.02 RATIO — SIGNIFICANT CHANGE UP (ref 0.88–1.16)
KETONES UR-MCNC: NEGATIVE — SIGNIFICANT CHANGE UP
LEUKOCYTE ESTERASE UR-ACNC: NEGATIVE — SIGNIFICANT CHANGE UP
LYMPHOCYTES # BLD AUTO: 1.26 K/UL — SIGNIFICANT CHANGE UP (ref 1–3.3)
LYMPHOCYTES # BLD AUTO: 18.9 % — SIGNIFICANT CHANGE UP (ref 13–44)
MCHC RBC-ENTMCNC: 29.1 PG — SIGNIFICANT CHANGE UP (ref 27–34)
MCHC RBC-ENTMCNC: 32.8 GM/DL — SIGNIFICANT CHANGE UP (ref 32–36)
MCV RBC AUTO: 88.7 FL — SIGNIFICANT CHANGE UP (ref 80–100)
MONOCYTES # BLD AUTO: 0.67 K/UL — SIGNIFICANT CHANGE UP (ref 0–0.9)
MONOCYTES NFR BLD AUTO: 10.1 % — SIGNIFICANT CHANGE UP (ref 2–14)
NEUTROPHILS # BLD AUTO: 4.49 K/UL — SIGNIFICANT CHANGE UP (ref 1.8–7.4)
NEUTROPHILS NFR BLD AUTO: 67.3 % — SIGNIFICANT CHANGE UP (ref 43–77)
NITRITE UR-MCNC: NEGATIVE — SIGNIFICANT CHANGE UP
PH UR: 5 — SIGNIFICANT CHANGE UP (ref 5–8)
PLATELET # BLD AUTO: 250 K/UL — SIGNIFICANT CHANGE UP (ref 150–400)
POTASSIUM SERPL-MCNC: 4 MMOL/L — SIGNIFICANT CHANGE UP (ref 3.5–5.3)
POTASSIUM SERPL-SCNC: 4 MMOL/L — SIGNIFICANT CHANGE UP (ref 3.5–5.3)
PROT UR-MCNC: NEGATIVE MG/DL — SIGNIFICANT CHANGE UP
PROTHROM AB SERPL-ACNC: 11.3 SEC — SIGNIFICANT CHANGE UP (ref 10–12.9)
RBC # BLD: 5.12 M/UL — SIGNIFICANT CHANGE UP (ref 4.2–5.8)
RBC # FLD: 13.2 % — SIGNIFICANT CHANGE UP (ref 10.3–14.5)
SODIUM SERPL-SCNC: 138 MMOL/L — SIGNIFICANT CHANGE UP (ref 135–145)
SP GR SPEC: 1.02 — SIGNIFICANT CHANGE UP (ref 1.01–1.02)
UROBILINOGEN FLD QL: NEGATIVE MG/DL — SIGNIFICANT CHANGE UP
WBC # BLD: 6.66 K/UL — SIGNIFICANT CHANGE UP (ref 3.8–10.5)
WBC # FLD AUTO: 6.66 K/UL — SIGNIFICANT CHANGE UP (ref 3.8–10.5)

## 2020-06-23 PROCEDURE — 85730 THROMBOPLASTIN TIME PARTIAL: CPT

## 2020-06-23 PROCEDURE — 80048 BASIC METABOLIC PNL TOTAL CA: CPT

## 2020-06-23 PROCEDURE — 36415 COLL VENOUS BLD VENIPUNCTURE: CPT

## 2020-06-23 PROCEDURE — 71046 X-RAY EXAM CHEST 2 VIEWS: CPT

## 2020-06-23 PROCEDURE — 86900 BLOOD TYPING SEROLOGIC ABO: CPT

## 2020-06-23 PROCEDURE — 71046 X-RAY EXAM CHEST 2 VIEWS: CPT | Mod: 26

## 2020-06-23 PROCEDURE — 81003 URINALYSIS AUTO W/O SCOPE: CPT

## 2020-06-23 PROCEDURE — 86901 BLOOD TYPING SEROLOGIC RH(D): CPT

## 2020-06-23 PROCEDURE — 93010 ELECTROCARDIOGRAM REPORT: CPT

## 2020-06-23 PROCEDURE — 85610 PROTHROMBIN TIME: CPT

## 2020-06-23 PROCEDURE — 87086 URINE CULTURE/COLONY COUNT: CPT

## 2020-06-23 PROCEDURE — 85025 COMPLETE CBC W/AUTO DIFF WBC: CPT

## 2020-06-23 PROCEDURE — G0463: CPT | Mod: 25

## 2020-06-23 PROCEDURE — 93005 ELECTROCARDIOGRAM TRACING: CPT

## 2020-06-23 PROCEDURE — 86850 RBC ANTIBODY SCREEN: CPT

## 2020-06-23 RX ORDER — ALBUTEROL 90 UG/1
0.5 AEROSOL, METERED ORAL
Qty: 0 | Refills: 0 | DISCHARGE

## 2020-06-23 RX ORDER — ATORVASTATIN CALCIUM 80 MG/1
1 TABLET, FILM COATED ORAL
Qty: 0 | Refills: 0 | DISCHARGE

## 2020-06-23 NOTE — H&P PST ADULT - NSICDXPASTMEDICALHX_GEN_ALL_CORE_FT
PAST MEDICAL HISTORY:  Anxiety     Asthma Dec 2019 last exacerbation; never intubated    Elevated Cholesterol     HLD (hyperlipidemia)     Prostate cancer     Seasonal allergies

## 2020-06-23 NOTE — H&P PST ADULT - ASSESSMENT
64 year old male  he presents to PST for planned robotic assisted laparoscopic radical prostatectomy     Plan:  1. PST instructions given ; NPO status instructions to be given by ASU   2. Pt instructed to take following meds : dulera and albuterol  3. Pt instructed to take routine evening medications unless indicated   4. Stop NSAIDS ( Aspirin Alev Motrin Mobic Diclofenac), herbal supplements , MVI , Vitamin fish oil 7 days prior to surgery  unless   directed by surgeon or cardiologist;   5. Medical Optimization  with KAYLEIGH Tolliver   6. EZ wash instructions given   7. Labs EKG CXR as per surgeon request   8. Pt instructed to self quarantine   9. Covid Testing scheduled Pt notified and aware  10. Pt denies covid symptoms shortness of breath fever cough

## 2020-06-23 NOTE — H&P PST ADULT - NSICDXPASTSURGICALHX_GEN_ALL_CORE_FT
PAST SURGICAL HISTORY:  Arrest of Bone Growth childhood    H/O knee surgery left repair of torn patella    Rupture, Tendon, Achilles with repair right achilles 1986

## 2020-06-23 NOTE — H&P PST ADULT - HISTORY OF PRESENT ILLNESS
64 year old male with prostate cancer; Pt said he has had elevated PSA and c/o urinary frequency; denies dysuria and hematuria; he presents to PST for planned robotic assisted laparoscopic radical prostatectomy

## 2020-06-23 NOTE — H&P PST ADULT - NSICDXFAMILYHX_GEN_ALL_CORE_FT
FAMILY HISTORY:  FH: congestive heart failure, father  FH: prostate cancer, brother  FH: testicular cancer, father

## 2020-06-23 NOTE — H&P PST ADULT - DENTITION
normal/denies loose teeth or dentures Elevate the injured extremity as instructed./Instructed patient/caregiver to follow-up with primary care physician./Verbal/written post procedure instructions were given to patient/caregiver.

## 2020-06-23 NOTE — H&P PST ADULT - NSANTHOSAYNRD_GEN_A_CORE
sleep study negative 2005/No. HERI screening performed.  STOP BANG Legend: 0-2 = LOW Risk; 3-4 = INTERMEDIATE Risk; 5-8 = HIGH Risk

## 2020-06-24 ENCOUNTER — APPOINTMENT (OUTPATIENT)
Dept: INTERNAL MEDICINE | Facility: CLINIC | Age: 65
End: 2020-06-24
Payer: COMMERCIAL

## 2020-06-24 VITALS
OXYGEN SATURATION: 97 % | HEIGHT: 69 IN | WEIGHT: 210 LBS | DIASTOLIC BLOOD PRESSURE: 78 MMHG | SYSTOLIC BLOOD PRESSURE: 110 MMHG | HEART RATE: 82 BPM | BODY MASS INDEX: 31.1 KG/M2 | TEMPERATURE: 98.5 F

## 2020-06-24 DIAGNOSIS — C61 MALIGNANT NEOPLASM OF PROSTATE: ICD-10-CM

## 2020-06-24 DIAGNOSIS — Z01.818 ENCOUNTER FOR OTHER PREPROCEDURAL EXAMINATION: ICD-10-CM

## 2020-06-24 LAB
CULTURE RESULTS: SIGNIFICANT CHANGE UP
SPECIMEN SOURCE: SIGNIFICANT CHANGE UP

## 2020-06-24 PROCEDURE — 99214 OFFICE O/P EST MOD 30 MIN: CPT

## 2020-06-24 NOTE — PHYSICAL EXAM
[No Edema] : there was no peripheral edema [Normal] : normal rate, regular rhythm, normal S1 and S2 and no murmur heard [Normal Gait] : normal gait [No Rash] : no rash [Normal Oropharynx] : the oropharynx was normal [No Lymphadenopathy] : no lymphadenopathy [Normal TMs] : both tympanic membranes were normal [Soft] : abdomen soft [Non Tender] : non-tender [Supple] : supple [Normal Bowel Sounds] : normal bowel sounds [Non-distended] : non-distended [Normal Mood] : the mood was normal [Normal Affect] : the affect was normal [de-identified] : sebaceous cyst on upper back

## 2020-06-24 NOTE — HISTORY OF PRESENT ILLNESS
[No Pertinent Cardiac History] : no history of aortic stenosis, atrial fibrillation, coronary artery disease, recent myocardial infarction, or implantable device/pacemaker [Asthma] : asthma [No Adverse Anesthesia Reaction] : no adverse anesthesia reaction in self or family member [(Patient denies any chest pain, claudication, dyspnea on exertion, orthopnea, palpitations or syncope)] : Patient denies any chest pain, claudication, dyspnea on exertion, orthopnea, palpitations or syncope [Excellent (>10 METs)] : Excellent (>10 METs) [Anti-Platelet Agents: _____] : Anti-Platelet Agents: [unfilled] [COPD] : no COPD [Sleep Apnea] : no sleep apnea [Smoker] : not a smoker [Chronic Anticoagulation] : no chronic anticoagulation [Chronic Kidney Disease] : no chronic kidney disease [Diabetes] : no diabetes [FreeTextEntry1] : Robotic radical prostatectomy [FreeTextEntry2] : 6/30/20 [FreeTextEntry3] : Dr. Jero Abbott at [facility]. Fax: [phone], [phone] [FreeTextEntry7] : EKG\par Stress test - 2017

## 2020-06-24 NOTE — ASSESSMENT
[No Further Testing Recommended] : no further testing recommended [As per surgery] : as per surgery [Patient Optimized for Surgery] : Patient optimized for surgery [Modify medications prior to procedure] : Modify medications prior to procedure [FreeTextEntry4] : Patient is a low cardiac risk for a low risk surgical procedure. No medical contraindications for planned surgery. [FreeTextEntry7] : Hold NSAID's and vitamins 1 week prior to surgery.

## 2020-06-24 NOTE — RESULTS/DATA
[] : results reviewed [de-identified] : normal [de-identified] : normal [de-identified] : normal [de-identified] : normal [de-identified] : NSR @ 61, incomplete RBBB, no ischemic changes [de-identified] : UA normal

## 2020-06-27 ENCOUNTER — OUTPATIENT (OUTPATIENT)
Dept: OUTPATIENT SERVICES | Facility: HOSPITAL | Age: 65
LOS: 1 days | End: 2020-06-27
Payer: COMMERCIAL

## 2020-06-27 DIAGNOSIS — Z11.59 ENCOUNTER FOR SCREENING FOR OTHER VIRAL DISEASES: ICD-10-CM

## 2020-06-27 DIAGNOSIS — Z98.890 OTHER SPECIFIED POSTPROCEDURAL STATES: Chronic | ICD-10-CM

## 2020-06-27 PROCEDURE — U0003: CPT

## 2020-06-28 DIAGNOSIS — Z11.59 ENCOUNTER FOR SCREENING FOR OTHER VIRAL DISEASES: ICD-10-CM

## 2020-06-28 LAB — SARS-COV-2 RNA SPEC QL NAA+PROBE: SIGNIFICANT CHANGE UP

## 2020-06-29 RX ORDER — FENTANYL CITRATE 50 UG/ML
50 INJECTION INTRAVENOUS
Refills: 0 | Status: DISCONTINUED | OUTPATIENT
Start: 2020-06-30 | End: 2020-06-30

## 2020-06-29 RX ORDER — ONDANSETRON 8 MG/1
4 TABLET, FILM COATED ORAL ONCE
Refills: 0 | Status: COMPLETED | OUTPATIENT
Start: 2020-06-30 | End: 2020-06-30

## 2020-06-29 RX ORDER — SODIUM CHLORIDE 9 MG/ML
3 INJECTION INTRAMUSCULAR; INTRAVENOUS; SUBCUTANEOUS EVERY 8 HOURS
Refills: 0 | Status: DISCONTINUED | OUTPATIENT
Start: 2020-06-30 | End: 2020-07-01

## 2020-06-29 RX ORDER — OXYCODONE HYDROCHLORIDE 5 MG/1
5 TABLET ORAL ONCE
Refills: 0 | Status: DISCONTINUED | OUTPATIENT
Start: 2020-06-30 | End: 2020-06-30

## 2020-06-29 RX ORDER — FAMOTIDINE 10 MG/ML
20 INJECTION INTRAVENOUS ONCE
Refills: 0 | Status: COMPLETED | OUTPATIENT
Start: 2020-06-30 | End: 2020-06-30

## 2020-06-29 RX ORDER — SODIUM CHLORIDE 9 MG/ML
1000 INJECTION, SOLUTION INTRAVENOUS
Refills: 0 | Status: DISCONTINUED | OUTPATIENT
Start: 2020-06-30 | End: 2020-06-30

## 2020-06-29 RX ORDER — ACETAMINOPHEN 500 MG
975 TABLET ORAL ONCE
Refills: 0 | Status: COMPLETED | OUTPATIENT
Start: 2020-06-30 | End: 2020-06-30

## 2020-06-30 ENCOUNTER — APPOINTMENT (OUTPATIENT)
Dept: UROLOGY | Facility: HOSPITAL | Age: 65
End: 2020-06-30

## 2020-06-30 ENCOUNTER — INPATIENT (INPATIENT)
Facility: HOSPITAL | Age: 65
LOS: 0 days | Discharge: HOME | DRG: 708 | End: 2020-07-01
Attending: UROLOGY | Admitting: UROLOGY
Payer: COMMERCIAL

## 2020-06-30 ENCOUNTER — RESULT REVIEW (OUTPATIENT)
Age: 65
End: 2020-06-30

## 2020-06-30 VITALS
HEIGHT: 69 IN | HEART RATE: 78 BPM | WEIGHT: 212.97 LBS | TEMPERATURE: 98 F | SYSTOLIC BLOOD PRESSURE: 150 MMHG | OXYGEN SATURATION: 98 % | DIASTOLIC BLOOD PRESSURE: 96 MMHG | RESPIRATION RATE: 16 BRPM

## 2020-06-30 DIAGNOSIS — C61 MALIGNANT NEOPLASM OF PROSTATE: ICD-10-CM

## 2020-06-30 DIAGNOSIS — Z98.890 OTHER SPECIFIED POSTPROCEDURAL STATES: Chronic | ICD-10-CM

## 2020-06-30 LAB
ANION GAP SERPL CALC-SCNC: 7 MMOL/L — SIGNIFICANT CHANGE UP (ref 5–17)
BASOPHILS # BLD AUTO: 0.03 K/UL — SIGNIFICANT CHANGE UP (ref 0–0.2)
BASOPHILS NFR BLD AUTO: 0.2 % — SIGNIFICANT CHANGE UP (ref 0–2)
BUN SERPL-MCNC: 25 MG/DL — HIGH (ref 7–23)
CALCIUM SERPL-MCNC: 8.9 MG/DL — SIGNIFICANT CHANGE UP (ref 8.5–10.1)
CHLORIDE SERPL-SCNC: 111 MMOL/L — HIGH (ref 96–108)
CO2 SERPL-SCNC: 22 MMOL/L — SIGNIFICANT CHANGE UP (ref 22–31)
CREAT SERPL-MCNC: 1.14 MG/DL — SIGNIFICANT CHANGE UP (ref 0.5–1.3)
EOSINOPHIL # BLD AUTO: 0 K/UL — SIGNIFICANT CHANGE UP (ref 0–0.5)
EOSINOPHIL NFR BLD AUTO: 0 % — SIGNIFICANT CHANGE UP (ref 0–6)
GLUCOSE SERPL-MCNC: 173 MG/DL — HIGH (ref 70–99)
HCT VFR BLD CALC: 44.6 % — SIGNIFICANT CHANGE UP (ref 39–50)
HGB BLD-MCNC: 14.5 G/DL — SIGNIFICANT CHANGE UP (ref 13–17)
IMM GRANULOCYTES NFR BLD AUTO: 0.5 % — SIGNIFICANT CHANGE UP (ref 0–1.5)
LYMPHOCYTES # BLD AUTO: 0.45 K/UL — LOW (ref 1–3.3)
LYMPHOCYTES # BLD AUTO: 2.8 % — LOW (ref 13–44)
MCHC RBC-ENTMCNC: 29.4 PG — SIGNIFICANT CHANGE UP (ref 27–34)
MCHC RBC-ENTMCNC: 32.5 GM/DL — SIGNIFICANT CHANGE UP (ref 32–36)
MCV RBC AUTO: 90.5 FL — SIGNIFICANT CHANGE UP (ref 80–100)
MONOCYTES # BLD AUTO: 0.34 K/UL — SIGNIFICANT CHANGE UP (ref 0–0.9)
MONOCYTES NFR BLD AUTO: 2.1 % — SIGNIFICANT CHANGE UP (ref 2–14)
NEUTROPHILS # BLD AUTO: 15.18 K/UL — HIGH (ref 1.8–7.4)
NEUTROPHILS NFR BLD AUTO: 94.4 % — HIGH (ref 43–77)
PLATELET # BLD AUTO: 241 K/UL — SIGNIFICANT CHANGE UP (ref 150–400)
POTASSIUM SERPL-MCNC: 4.1 MMOL/L — SIGNIFICANT CHANGE UP (ref 3.5–5.3)
POTASSIUM SERPL-SCNC: 4.1 MMOL/L — SIGNIFICANT CHANGE UP (ref 3.5–5.3)
RBC # BLD: 4.93 M/UL — SIGNIFICANT CHANGE UP (ref 4.2–5.8)
RBC # FLD: 13 % — SIGNIFICANT CHANGE UP (ref 10.3–14.5)
SODIUM SERPL-SCNC: 140 MMOL/L — SIGNIFICANT CHANGE UP (ref 135–145)
WBC # BLD: 16.08 K/UL — HIGH (ref 3.8–10.5)
WBC # FLD AUTO: 16.08 K/UL — HIGH (ref 3.8–10.5)

## 2020-06-30 PROCEDURE — 85025 COMPLETE CBC W/AUTO DIFF WBC: CPT

## 2020-06-30 PROCEDURE — 88307 TISSUE EXAM BY PATHOLOGIST: CPT | Mod: 26

## 2020-06-30 PROCEDURE — 88309 TISSUE EXAM BY PATHOLOGIST: CPT | Mod: 26

## 2020-06-30 PROCEDURE — 80048 BASIC METABOLIC PNL TOTAL CA: CPT

## 2020-06-30 PROCEDURE — 36415 COLL VENOUS BLD VENIPUNCTURE: CPT

## 2020-06-30 PROCEDURE — 86769 SARS-COV-2 COVID-19 ANTIBODY: CPT

## 2020-06-30 RX ORDER — PROCHLORPERAZINE MALEATE 5 MG
10 TABLET ORAL EVERY 6 HOURS
Refills: 0 | Status: DISCONTINUED | OUTPATIENT
Start: 2020-06-30 | End: 2020-07-01

## 2020-06-30 RX ORDER — ACETAMINOPHEN 500 MG
1000 TABLET ORAL ONCE
Refills: 0 | Status: COMPLETED | OUTPATIENT
Start: 2020-06-30 | End: 2020-06-30

## 2020-06-30 RX ORDER — OXYCODONE HYDROCHLORIDE 5 MG/1
5 TABLET ORAL EVERY 6 HOURS
Refills: 0 | Status: DISCONTINUED | OUTPATIENT
Start: 2020-06-30 | End: 2020-06-30

## 2020-06-30 RX ORDER — ONDANSETRON 8 MG/1
4 TABLET, FILM COATED ORAL EVERY 6 HOURS
Refills: 0 | Status: DISCONTINUED | OUTPATIENT
Start: 2020-06-30 | End: 2020-07-01

## 2020-06-30 RX ORDER — ALBUTEROL 90 UG/1
2 AEROSOL, METERED ORAL
Qty: 0 | Refills: 0 | DISCHARGE

## 2020-06-30 RX ORDER — PANTOPRAZOLE SODIUM 20 MG/1
40 TABLET, DELAYED RELEASE ORAL
Refills: 0 | Status: DISCONTINUED | OUTPATIENT
Start: 2020-06-30 | End: 2020-07-01

## 2020-06-30 RX ORDER — SODIUM CHLORIDE 9 MG/ML
1000 INJECTION INTRAMUSCULAR; INTRAVENOUS; SUBCUTANEOUS
Refills: 0 | Status: DISCONTINUED | OUTPATIENT
Start: 2020-06-30 | End: 2020-07-01

## 2020-06-30 RX ORDER — FAMOTIDINE 10 MG/ML
20 INJECTION INTRAVENOUS EVERY 12 HOURS
Refills: 0 | Status: DISCONTINUED | OUTPATIENT
Start: 2020-06-30 | End: 2020-07-01

## 2020-06-30 RX ORDER — PROCHLORPERAZINE MALEATE 5 MG
10 TABLET ORAL ONCE
Refills: 0 | Status: COMPLETED | OUTPATIENT
Start: 2020-06-30 | End: 2020-06-30

## 2020-06-30 RX ORDER — HEPARIN SODIUM 5000 [USP'U]/ML
5000 INJECTION INTRAVENOUS; SUBCUTANEOUS EVERY 8 HOURS
Refills: 0 | Status: DISCONTINUED | OUTPATIENT
Start: 2020-06-30 | End: 2020-07-01

## 2020-06-30 RX ORDER — ACETAMINOPHEN 500 MG
1000 TABLET ORAL ONCE
Refills: 0 | Status: COMPLETED | OUTPATIENT
Start: 2020-06-30 | End: 2020-07-01

## 2020-06-30 RX ORDER — HYDROMORPHONE HYDROCHLORIDE 2 MG/ML
1 INJECTION INTRAMUSCULAR; INTRAVENOUS; SUBCUTANEOUS EVERY 4 HOURS
Refills: 0 | Status: DISCONTINUED | OUTPATIENT
Start: 2020-06-30 | End: 2020-07-01

## 2020-06-30 RX ORDER — KETOROLAC TROMETHAMINE 30 MG/ML
15 SYRINGE (ML) INJECTION EVERY 6 HOURS
Refills: 0 | Status: DISCONTINUED | OUTPATIENT
Start: 2020-06-30 | End: 2020-07-01

## 2020-06-30 RX ORDER — PREGABALIN 225 MG/1
0 CAPSULE ORAL
Qty: 0 | Refills: 0 | DISCHARGE

## 2020-06-30 RX ORDER — SERTRALINE 25 MG/1
1 TABLET, FILM COATED ORAL
Qty: 0 | Refills: 0 | DISCHARGE

## 2020-06-30 RX ORDER — MOMETASONE FUROATE AND FORMOTEROL FUMARATE DIHYDRATE 200; 5 UG/1; UG/1
2 AEROSOL RESPIRATORY (INHALATION)
Qty: 0 | Refills: 0 | DISCHARGE

## 2020-06-30 RX ORDER — HYDRALAZINE HCL 50 MG
5 TABLET ORAL ONCE
Refills: 0 | Status: COMPLETED | OUTPATIENT
Start: 2020-06-30 | End: 2020-06-30

## 2020-06-30 RX ORDER — CEFAZOLIN SODIUM 1 G
2000 VIAL (EA) INJECTION EVERY 8 HOURS
Refills: 0 | Status: COMPLETED | OUTPATIENT
Start: 2020-06-30 | End: 2020-07-01

## 2020-06-30 RX ORDER — OXYCODONE HYDROCHLORIDE 5 MG/1
5 TABLET ORAL ONCE
Refills: 0 | Status: DISCONTINUED | OUTPATIENT
Start: 2020-06-30 | End: 2020-06-30

## 2020-06-30 RX ADMIN — OXYCODONE HYDROCHLORIDE 5 MILLIGRAM(S): 5 TABLET ORAL at 13:34

## 2020-06-30 RX ADMIN — Medication 400 MILLIGRAM(S): at 18:08

## 2020-06-30 RX ADMIN — HEPARIN SODIUM 5000 UNIT(S): 5000 INJECTION INTRAVENOUS; SUBCUTANEOUS at 21:43

## 2020-06-30 RX ADMIN — OXYCODONE HYDROCHLORIDE 5 MILLIGRAM(S): 5 TABLET ORAL at 12:45

## 2020-06-30 RX ADMIN — FENTANYL CITRATE 50 MICROGRAM(S): 50 INJECTION INTRAVENOUS at 12:32

## 2020-06-30 RX ADMIN — FAMOTIDINE 20 MILLIGRAM(S): 10 INJECTION INTRAVENOUS at 06:45

## 2020-06-30 RX ADMIN — SODIUM CHLORIDE 100 MILLILITER(S): 9 INJECTION, SOLUTION INTRAVENOUS at 12:37

## 2020-06-30 RX ADMIN — FENTANYL CITRATE 50 MICROGRAM(S): 50 INJECTION INTRAVENOUS at 13:20

## 2020-06-30 RX ADMIN — ONDANSETRON 4 MILLIGRAM(S): 8 TABLET, FILM COATED ORAL at 18:00

## 2020-06-30 RX ADMIN — Medication 5 MILLIGRAM(S): at 14:55

## 2020-06-30 RX ADMIN — ONDANSETRON 4 MILLIGRAM(S): 8 TABLET, FILM COATED ORAL at 12:47

## 2020-06-30 RX ADMIN — Medication 975 MILLIGRAM(S): at 06:45

## 2020-06-30 RX ADMIN — SODIUM CHLORIDE 3 MILLILITER(S): 9 INJECTION INTRAMUSCULAR; INTRAVENOUS; SUBCUTANEOUS at 21:20

## 2020-06-30 RX ADMIN — Medication 100 MILLIGRAM(S): at 17:29

## 2020-06-30 RX ADMIN — Medication 10 MILLIGRAM(S): at 15:18

## 2020-06-30 NOTE — ASU PATIENT PROFILE, ADULT - FALL HARM RISK TYPE OF ASSESSMENT
[FreeTextEntry1] : 47 y/o F w/\par \par 1. Hypothyroidism- Likely Hashimoto's given family hx and ultrasound findings. Pt. nonadherent for the past 3 weeks. Restart levothyroxine at 100 mcg daily. Pt. to take daily in am on empty stomach at least 30 min prior to breakfast. Will repeat TFTs  in 6-8 weeks once restaking consistently. \par \par 2. Multinodular Goiter- consistent with Hashimoto's. Thyroid U/S performed 10/2016 (see results section for full description). She currently has no symptoms of tracheal compression or obstruction. Appears to be more of a cosmetic concern. Will continue to monitor with serial ultrasounds every 12  months to reassess nodularity. Will hold off on any FNA at this time at the nodule does not appear suspicious. Will have patient send prior ultrasound reports and pathology report from prior evaluation of thyroid nodules. 
Admission

## 2020-06-30 NOTE — CHART NOTE - NSCHARTNOTEFT_GEN_A_CORE
Pt seen and examined at bedside s/p robotic radical prostatectomy earlier today.  Pt with IS at bedside understands proper usage.  ICU Vital Signs Last 24 Hrs  T(C): 36.7 (30 Jun 2020 16:30), Max: 36.7 (30 Jun 2020 16:30)  T(F): 98 (30 Jun 2020 16:30), Max: 98 (30 Jun 2020 16:30)  HR: 95 (30 Jun 2020 16:30) (69 - 95)  BP: 147/81 (30 Jun 2020 16:30) (130/87 - 174/79)  RR: 15 (30 Jun 2020 16:30) (11 - 16)  SpO2: 96% (30 Jun 2020 16:30) (93% - 100%)                      14.5   16.08 )-----------( 241      ( 30 Jun 2020 13:09 )             44.6       06-30    140  |  111<H>  |  25<H>  ----------------------------<  173<H>  4.1   |  22  |  1.14    Ca    8.9      30 Jun 2020 13:09    CV- RRR, S1, S2  Lungs- CTA b/L, no wheezing, rales and rhonchi  ABD- soft, ESTHELA draining, incisional tenderness only, non-distended.    Plan- continue current treatment, labs in am, advance as tolerated, DVT proph, GI proph.

## 2020-06-30 NOTE — CONSULT NOTE ADULT - SUBJECTIVE AND OBJECTIVE BOX
CC- s/p robotic prostatectomy    HPI:  65yo/M with PMH asthma, hyperlipidemia, recently diagnosed with prostate adenocarcinoma presented for robotic prostatectomy. Medical consult called for postop medical management.     PMH- as above  PSH- patella repair, achilles tendon repair  Soc hx- denies smoking, no alcohol  Fam hx- f had CHF, testicular ca; br- prostate ca    6/30- feels nauseous, received oxycodone earleir    Review of system- All 10 systems reviewed and is as per HPI otherwise negative.     T(C): 36.7 (06-30-20 @ 16:30), Max: 36.7 (06-30-20 @ 16:30)  HR: 95 (06-30-20 @ 16:30) (69 - 95)  BP: 147/81 (06-30-20 @ 16:30) (130/87 - 174/79)  RR: 15 (06-30-20 @ 16:30) (11 - 16)  SpO2: 96% (06-30-20 @ 16:30) (93% - 100%)  Wt(kg): --    LABS:                        14.5   16.08 )-----------( 241      ( 30 Jun 2020 13:09 )             44.6     06-30    140  |  111<H>  |  25<H>  ----------------------------<  173<H>  4.1   |  22  |  1.14    Ca    8.9      30 Jun 2020 13:09    RADIOLOGY & ADDITIONAL TESTS:      PHYSICAL EXAM:  GENERAL: NAD, well-groomed, well-developed  HEAD:  Atraumatic, Normocephalic  EYES: EOMI, PERRLA, conjunctiva and sclera clear  HEENT: Moist mucous membranes  NECK: Supple, No JVD  NERVOUS SYSTEM:  Alert & Oriented X3, Motor Strength 5/5 B/L upper and lower extremities; DTRs 2+ intact and symmetric  CHEST/LUNG: Clear to auscultation bilaterally; No rales, rhonchi, wheezing, or rubs  HEART: Regular rate and rhythm; No murmurs, rubs, or gallops  ABDOMEN: Postopt, +ESTHELA drain  GENITOURINARY- Hubbard with hematuria  EXTREMITIES:  2+ Peripheral Pulses, No clubbing, cyanosis, or edema  MUSCULOSKELTAL- No muscle tenderness, Muscle tone normal, No joint tenderness, no Joint swelling, Joint range of motion-normal  SKIN-no rash, no lesion  CNS- alert, oriented X3, non focal       Daily Height in cm: 175.26 (30 Jun 2020 06:37)      MEDICATIONS  (STANDING):  acetaminophen  IVPB .. 1000 milliGRAM(s) IV Intermittent once  ceFAZolin   IVPB 2000 milliGRAM(s) IV Intermittent every 8 hours  heparin   Injectable 5000 Unit(s) SubCutaneous every 8 hours  sodium chloride 0.9% lock flush 3 milliLiter(s) IV Push every 8 hours  sodium chloride 0.9%. 1000 milliLiter(s) (125 mL/Hr) IV Continuous <Continuous>    MEDICATIONS  (PRN):  famotidine    Tablet 20 milliGRAM(s) Oral every 12 hours PRN Dyspepsia  HYDROmorphone  Injectable 1 milliGRAM(s) IV Push every 4 hours PRN Severe Pain (7 - 10)  ondansetron Injectable 4 milliGRAM(s) IV Push every 6 hours PRN Nausea  oxyCODONE    IR 5 milliGRAM(s) Oral every 6 hours PRN Moderate Pain (4 - 6)    Assessment/Plan  #Prostate ca s/p robotic prostatectomy  Urology f/u appreciated  Hubbard in with expected postop hematuria  ESTHELA drain per urology  Pain meds- will limit narcotics due to nausea- use IV tylenol and toradol  AC by heparin SQ  OOB to ambulate  Incentive spirometry    #Nausea postop  Antiemetics prn. Avoid narcotics    #Hyperlipidemia- cont statin    #DVT proph- heparin SQ    #Dispo- thank you for consult, will follow

## 2020-06-30 NOTE — ASU PATIENT PROFILE, ADULT - NS PRO MODE OF ARRIVAL
N[facility]    Patient Name:  Zac Lovell  YOB: 1953  MRN: [id number]  Admit Date:  (Not on file)    I have been unable to reach this patient by phone.  A letter is being sent to the last known home address.      Electronically signed by:  Apolonia Linares RD  04/17/19 11:00 AM    ambulatory

## 2020-06-30 NOTE — ASU PATIENT PROFILE, ADULT - PMH
Anxiety    Asthma  Dec 2019 last exacerbation; never intubated  Elevated Cholesterol    HLD (hyperlipidemia)    Prostate cancer    Seasonal allergies

## 2020-06-30 NOTE — ASU PATIENT PROFILE, ADULT - PSH
Arrest of Bone Growth  childhood  H/O knee surgery  left repair of torn patella  Rupture, Tendon, Achilles  with repair right achilles 1986

## 2020-07-01 ENCOUNTER — TRANSCRIPTION ENCOUNTER (OUTPATIENT)
Age: 65
End: 2020-07-01

## 2020-07-01 VITALS
RESPIRATION RATE: 18 BRPM | DIASTOLIC BLOOD PRESSURE: 86 MMHG | SYSTOLIC BLOOD PRESSURE: 157 MMHG | TEMPERATURE: 99 F | HEART RATE: 90 BPM | OXYGEN SATURATION: 95 %

## 2020-07-01 LAB
ANION GAP SERPL CALC-SCNC: 6 MMOL/L — SIGNIFICANT CHANGE UP (ref 5–17)
BASOPHILS # BLD AUTO: 0.02 K/UL — SIGNIFICANT CHANGE UP (ref 0–0.2)
BASOPHILS NFR BLD AUTO: 0.2 % — SIGNIFICANT CHANGE UP (ref 0–2)
BUN SERPL-MCNC: 16 MG/DL — SIGNIFICANT CHANGE UP (ref 7–23)
CALCIUM SERPL-MCNC: 8.5 MG/DL — SIGNIFICANT CHANGE UP (ref 8.5–10.1)
CHLORIDE SERPL-SCNC: 112 MMOL/L — HIGH (ref 96–108)
CO2 SERPL-SCNC: 23 MMOL/L — SIGNIFICANT CHANGE UP (ref 22–31)
CREAT SERPL-MCNC: 0.86 MG/DL — SIGNIFICANT CHANGE UP (ref 0.5–1.3)
EOSINOPHIL # BLD AUTO: 0.01 K/UL — SIGNIFICANT CHANGE UP (ref 0–0.5)
EOSINOPHIL NFR BLD AUTO: 0.1 % — SIGNIFICANT CHANGE UP (ref 0–6)
GLUCOSE SERPL-MCNC: 126 MG/DL — HIGH (ref 70–99)
HCT VFR BLD CALC: 40.1 % — SIGNIFICANT CHANGE UP (ref 39–50)
HGB BLD-MCNC: 13.7 G/DL — SIGNIFICANT CHANGE UP (ref 13–17)
IMM GRANULOCYTES NFR BLD AUTO: 0.4 % — SIGNIFICANT CHANGE UP (ref 0–1.5)
LYMPHOCYTES # BLD AUTO: 0.84 K/UL — LOW (ref 1–3.3)
LYMPHOCYTES # BLD AUTO: 7.7 % — LOW (ref 13–44)
MCHC RBC-ENTMCNC: 30.4 PG — SIGNIFICANT CHANGE UP (ref 27–34)
MCHC RBC-ENTMCNC: 34.2 GM/DL — SIGNIFICANT CHANGE UP (ref 32–36)
MCV RBC AUTO: 88.9 FL — SIGNIFICANT CHANGE UP (ref 80–100)
MONOCYTES # BLD AUTO: 1.1 K/UL — HIGH (ref 0–0.9)
MONOCYTES NFR BLD AUTO: 10 % — SIGNIFICANT CHANGE UP (ref 2–14)
NEUTROPHILS # BLD AUTO: 8.96 K/UL — HIGH (ref 1.8–7.4)
NEUTROPHILS NFR BLD AUTO: 81.6 % — HIGH (ref 43–77)
PLATELET # BLD AUTO: 221 K/UL — SIGNIFICANT CHANGE UP (ref 150–400)
POTASSIUM SERPL-MCNC: 3.6 MMOL/L — SIGNIFICANT CHANGE UP (ref 3.5–5.3)
POTASSIUM SERPL-SCNC: 3.6 MMOL/L — SIGNIFICANT CHANGE UP (ref 3.5–5.3)
RBC # BLD: 4.51 M/UL — SIGNIFICANT CHANGE UP (ref 4.2–5.8)
RBC # FLD: 13.3 % — SIGNIFICANT CHANGE UP (ref 10.3–14.5)
SARS-COV-2 IGG SERPL QL IA: NEGATIVE — SIGNIFICANT CHANGE UP
SARS-COV-2 IGM SERPL IA-ACNC: <0.1 INDEX — SIGNIFICANT CHANGE UP
SODIUM SERPL-SCNC: 141 MMOL/L — SIGNIFICANT CHANGE UP (ref 135–145)
WBC # BLD: 10.97 K/UL — HIGH (ref 3.8–10.5)
WBC # FLD AUTO: 10.97 K/UL — HIGH (ref 3.8–10.5)

## 2020-07-01 PROCEDURE — 99232 SBSQ HOSP IP/OBS MODERATE 35: CPT

## 2020-07-01 PROCEDURE — 99238 HOSP IP/OBS DSCHRG MGMT 30/<: CPT

## 2020-07-01 RX ORDER — ACETAMINOPHEN 500 MG
0 TABLET ORAL
Qty: 0 | Refills: 0 | DISCHARGE

## 2020-07-01 RX ORDER — FAMOTIDINE 10 MG/ML
1 INJECTION INTRAVENOUS
Qty: 0 | Refills: 0 | DISCHARGE
Start: 2020-07-01

## 2020-07-01 RX ORDER — FLUVASTATIN SODIUM 80 MG/1
1 TABLET, FILM COATED, EXTENDED RELEASE ORAL
Qty: 0 | Refills: 0 | DISCHARGE

## 2020-07-01 RX ADMIN — Medication 15 MILLIGRAM(S): at 07:37

## 2020-07-01 RX ADMIN — Medication 400 MILLIGRAM(S): at 00:05

## 2020-07-01 RX ADMIN — Medication 15 MILLIGRAM(S): at 15:10

## 2020-07-01 RX ADMIN — SODIUM CHLORIDE 3 MILLILITER(S): 9 INJECTION INTRAMUSCULAR; INTRAVENOUS; SUBCUTANEOUS at 13:04

## 2020-07-01 RX ADMIN — SODIUM CHLORIDE 3 MILLILITER(S): 9 INJECTION INTRAMUSCULAR; INTRAVENOUS; SUBCUTANEOUS at 05:32

## 2020-07-01 RX ADMIN — Medication 100 MILLIGRAM(S): at 00:50

## 2020-07-01 RX ADMIN — HEPARIN SODIUM 5000 UNIT(S): 5000 INJECTION INTRAVENOUS; SUBCUTANEOUS at 05:45

## 2020-07-01 RX ADMIN — PANTOPRAZOLE SODIUM 40 MILLIGRAM(S): 20 TABLET, DELAYED RELEASE ORAL at 10:26

## 2020-07-01 NOTE — DISCHARGE NOTE PROVIDER - NSDCFUADDINST_GEN_ALL_CORE_FT
may shower on 7/2/2020    Hubbard catheter to remain in place until follow up appointment on 7/8/2020

## 2020-07-01 NOTE — PROGRESS NOTE ADULT - SUBJECTIVE AND OBJECTIVE BOX
Status Post: Robotic Radical Prostatectomy    Post Operative Day #: 1    SUBJECTIVE:    Flatus: N             Bowel Movement: N  Pain (0-10): 3           Pain Control Adequate: Y  Nausea: N          Vomiting: N  Diarrhea/Constipation?  N  Chest Pain: N   SOB:  N    MEDICATIONS  (STANDING):  heparin   Injectable 5000 Unit(s) SubCutaneous every 8 hours  pantoprazole    Tablet 40 milliGRAM(s) Oral before breakfast  sodium chloride 0.9% lock flush 3 milliLiter(s) IV Push every 8 hours  sodium chloride 0.9%. 1000 milliLiter(s) (125 mL/Hr) IV Continuous <Continuous>    MEDICATIONS  (PRN):  famotidine    Tablet 20 milliGRAM(s) Oral every 12 hours PRN Dyspepsia  HYDROmorphone  Injectable 1 milliGRAM(s) IV Push every 4 hours PRN Severe Pain (7 - 10)  ketorolac   Injectable 15 milliGRAM(s) IV Push every 6 hours PRN Moderate Pain (4 - 6)  ondansetron Injectable 4 milliGRAM(s) IV Push every 6 hours PRN Nausea  prochlorperazine   Injectable 10 milliGRAM(s) IV Push every 6 hours PRN nausea/vomiting      OBJECTIVE:  Vital Signs Last 24 Hrs  T(C): 37.2 (01 Jul 2020 09:28), Max: 37.2 (01 Jul 2020 09:28)  T(F): 99 (01 Jul 2020 09:28), Max: 99 (01 Jul 2020 09:28)  HR: 90 (01 Jul 2020 09:28) (69 - 95)  BP: 157/86 (01 Jul 2020 09:28) (130/87 - 174/79)  BP(mean): --  RR: 18 (01 Jul 2020 09:28) (11 - 18)  SpO2: 95% (01 Jul 2020 09:28) (93% - 100%)  I&O's Detail    30 Jun 2020 07:01  -  01 Jul 2020 07:00  --------------------------------------------------------  IN:    IV PiggyBack: 150 mL    lactated ringers.: 250 mL    Other: 900 mL    sodium chloride 0.9%.: 1500 mL  Total IN: 2800 mL    OUT:    Bulb: 115 mL    Indwelling Catheter - Urethral: 2200 mL    Other: 120 mL    Voided: 300 mL  Total OUT: 2735 mL    Total NET: 65 mL      01 Jul 2020 07:01  -  01 Jul 2020 10:57  --------------------------------------------------------  IN:    Oral Fluid: 120 mL  Total IN: 120 mL    OUT:  Total OUT: 0 mL    Total NET: 120 mL                          13.7   10.97 )-----------( 221      ( 01 Jul 2020 06:50 )             40.1     Na= 141, K= 3.6, Cl= 112, CO2= 23, BUN= 16, Cr= 0.36, Glucose= 126      PHYSICAL EXAM:      Constitutional: well developed in NAD    HEENT: NC/ AT, sclera anicteric, gross hearing intact    Respiratory: CTA B/L, no W/R/R    Cardiovascular: s1 and s2    Gastrointestinal: non-distended, hypoactive BS x 4, soft, incision sites with Dermabond, C/D/I, diffusely tender to deep palpation, ESTHELA drain to right sided of abdomen with     Genitourinary: desir catheter in place draining clear yellow urine.  no edema    Extremities: no C/C/E    Neurological: A&Ox3    Skin: warm and dry    Musculoskeletal:  FROM x 4    Psychiatric: normal affect DISCHARGE NOTE    Status Post: Robotic Radical Prostatectomy    Post Operative Day #: 1    SUBJECTIVE:    Flatus: N             Bowel Movement: N  Pain (0-10): 3           Pain Control Adequate: Y  Nausea: N          Vomiting: N  Diarrhea/Constipation?  N  Chest Pain: N   SOB:  N    MEDICATIONS  (STANDING):  heparin   Injectable 5000 Unit(s) SubCutaneous every 8 hours  pantoprazole    Tablet 40 milliGRAM(s) Oral before breakfast  sodium chloride 0.9% lock flush 3 milliLiter(s) IV Push every 8 hours  sodium chloride 0.9%. 1000 milliLiter(s) (125 mL/Hr) IV Continuous <Continuous>    MEDICATIONS  (PRN):  famotidine    Tablet 20 milliGRAM(s) Oral every 12 hours PRN Dyspepsia  HYDROmorphone  Injectable 1 milliGRAM(s) IV Push every 4 hours PRN Severe Pain (7 - 10)  ketorolac   Injectable 15 milliGRAM(s) IV Push every 6 hours PRN Moderate Pain (4 - 6)  ondansetron Injectable 4 milliGRAM(s) IV Push every 6 hours PRN Nausea  prochlorperazine   Injectable 10 milliGRAM(s) IV Push every 6 hours PRN nausea/vomiting      OBJECTIVE:  Vital Signs Last 24 Hrs  T(C): 37.2 (01 Jul 2020 09:28), Max: 37.2 (01 Jul 2020 09:28)  T(F): 99 (01 Jul 2020 09:28), Max: 99 (01 Jul 2020 09:28)  HR: 90 (01 Jul 2020 09:28) (69 - 95)  BP: 157/86 (01 Jul 2020 09:28) (130/87 - 174/79)  BP(mean): --  RR: 18 (01 Jul 2020 09:28) (11 - 18)  SpO2: 95% (01 Jul 2020 09:28) (93% - 100%)  I&O's Detail    30 Jun 2020 07:01  -  01 Jul 2020 07:00  --------------------------------------------------------  IN:    IV PiggyBack: 150 mL    lactated ringers.: 250 mL    Other: 900 mL    sodium chloride 0.9%.: 1500 mL  Total IN: 2800 mL    OUT:    Bulb: 115 mL    Indwelling Catheter - Urethral: 2200 mL    Other: 120 mL    Voided: 300 mL  Total OUT: 2735 mL    Total NET: 65 mL      01 Jul 2020 07:01  -  01 Jul 2020 10:57  --------------------------------------------------------  IN:    Oral Fluid: 120 mL  Total IN: 120 mL    OUT:  Total OUT: 0 mL    Total NET: 120 mL                          13.7   10.97 )-----------( 221      ( 01 Jul 2020 06:50 )             40.1     Na= 141, K= 3.6, Cl= 112, CO2= 23, BUN= 16, Cr= 0.36, Glucose= 126      PHYSICAL EXAM:      Constitutional: well developed in NAD    HEENT: NC/ AT, sclera anicteric, gross hearing intact    Respiratory: CTA B/L, no W/R/R    Cardiovascular: s1 and s2    Gastrointestinal: non-distended, hypoactive BS x 4, soft, incision sites with Dermabond, C/D/I, diffusely tender to deep palpation, ESTHELA drain to right sided of abdomen with     Genitourinary: desir catheter in place draining clear yellow urine.  no edema    Extremities: no C/C/E    Neurological: A&Ox3    Skin: warm and dry    Musculoskeletal:  FROM x 4    Psychiatric: normal affect

## 2020-07-01 NOTE — PROGRESS NOTE ADULT - SUBJECTIVE AND OBJECTIVE BOX
CC- s/p robotic prostatectomy    HPI:  63yo/M with PMH asthma, hyperlipidemia, recently diagnosed with prostate adenocarcinoma presented for robotic prostatectomy. Medical consult called for postop medical management.     7/1: pt seen and examined this am. Nausea markedly improved. no sob/chest pain.     Review of system- All 10 systems reviewed and is as per HPI otherwise negative.     Vital Signs Last 24 Hrs  T(C): 37.2 (01 Jul 2020 09:28), Max: 37.2 (01 Jul 2020 09:28)  T(F): 99 (01 Jul 2020 09:28), Max: 99 (01 Jul 2020 09:28)  HR: 90 (01 Jul 2020 09:28) (79 - 95)  BP: 157/86 (01 Jul 2020 09:28) (134/74 - 158/82)  RR: 18 (01 Jul 2020 09:28) (16 - 18)  SpO2: 95% (01 Jul 2020 09:28) (95% - 97%)    PHYSICAL EXAM:    GENERAL: Comfortable, no acute distress   HEAD:  Normocephalic, atraumatic  EYES: EOMI, PERRLA  HEENT: Moist mucous membranes  NECK: Supple, No JVD  NERVOUS SYSTEM:  Alert & Oriented X3, Motor Strength 5/5 B/L upper and lower extremities  CHEST/LUNG: Clear to auscultation bilaterally  HEART: Regular rate and rhythm  ABDOMEN: Soft, non distended, mildly tender. +JOSE. Dressing intact.  GENITOURINARY: Voiding, no palpable bladder  EXTREMITIES:   No clubbing, cyanosis, or edema  MUSCULOSKELETAL- No muscle tenderness, no joint tenderness  SKIN-no rash      LABS:                        13.7   10.97 )-----------( 221      ( 01 Jul 2020 06:50 )             40.1     07-01    141  |  112<H>  |  16  ----------------------------<  126<H>  3.6   |  23  |  0.86    Ca    8.5      01 Jul 2020 06:50      MEDICATIONS  (STANDING):  heparin   Injectable 5000 Unit(s) SubCutaneous every 8 hours  pantoprazole    Tablet 40 milliGRAM(s) Oral before breakfast  sodium chloride 0.9% lock flush 3 milliLiter(s) IV Push every 8 hours  sodium chloride 0.9%. 1000 milliLiter(s) (125 mL/Hr) IV Continuous <Continuous>    MEDICATIONS  (PRN):  famotidine    Tablet 20 milliGRAM(s) Oral every 12 hours PRN Dyspepsia  HYDROmorphone  Injectable 1 milliGRAM(s) IV Push every 4 hours PRN Severe Pain (7 - 10)  ketorolac   Injectable 15 milliGRAM(s) IV Push every 6 hours PRN Moderate Pain (4 - 6)  ondansetron Injectable 4 milliGRAM(s) IV Push every 6 hours PRN Nausea  prochlorperazine   Injectable 10 milliGRAM(s) IV Push every 6 hours PRN nausea/vomiting      Assessment/Plan  #Prostate ca s/p robotic prostatectomy  Urology f/u appreciated  monitor desir/jose output.  pain control.   OOB to ambulate  Incentive spirometry    #Nausea postop  -resolved.    #Hyperlipidemia  - cont statin    #DVT proph  - heparin SQ

## 2020-07-01 NOTE — PROGRESS NOTE ADULT - ASSESSMENT
POD # 1 s/p Robotic Radical Prostatectomy    -- case d/w Dr. Abbott  -- desir to gravity  -- monitor I/O's  -- analgesics  -- IVF  -- advance diet as tolerate  -- OOB and ambulate  -- DVT prophylaxis  -- Incentive spirometry  -- GI prophylaxis  -- daily labs POD # 1 s/p Robotic Radical Prostatectomy    -- case seen and d/w Dr. Abbott  -- desir to gravity  -- monitor I/O's  -- analgesics  -- IVF  -- regular diet  -- OOB and ambulate  -- DVT prophylaxis  -- Incentive spirometry  -- GI prophylaxis  -- D/C home later today  -- ESTHELA drain to be removed just prior to discharge

## 2020-07-01 NOTE — DISCHARGE NOTE PROVIDER - HOSPITAL COURSE
65 yo male POD # 1 s/p robotic radical prostatectomy.  Patient doing well today, tolerating a regular diet with no c/o N/V or abdominal pain at this time.  Hubbard catheter in place draining clear yellow urine.  Patient seen with Dr. Abbott.  As per attending, patient is stable for discharge home.  Hubbard catheter to remain in place.  ESTHELA drain to be removed just prior to discharge.

## 2020-07-01 NOTE — DISCHARGE NOTE NURSING/CASE MANAGEMENT/SOCIAL WORK - PATIENT PORTAL LINK FT
You can access the FollowMyHealth Patient Portal offered by [facility] by registering at the following website: [email or web address]. By joining [employer]’s FollowMyHealth portal, you will also be able to view your health information using other applications (apps) compatible with our system.

## 2020-07-01 NOTE — DISCHARGE NOTE PROVIDER - NSDCMRMEDTOKEN_GEN_ALL_CORE_FT
Albuterol (Eqv-Proventil HFA) 90 mcg/inh inhalation aerosol: 2 puff(s) inhaled every 12 hours  Aleve 220 mg oral tablet: 1 tab(s) orally every 8 hours, As Needed for mild to moderate pain  Dulera 50 mcg-5 mcg/inh inhalation aerosol: 2 puff(s) inhaled 2 times a day  famotidine 20 mg oral tablet: 1 tab(s) orally every 12 hours, As needed, Dyspepsia  multivitamin:   Tylenol 500 mg oral tablet: 2 tab(s) orally every 8 hours as needed for mild to moderate pain  Vitamin B12:   Zoloft 50 mg oral tablet: 1 tab(s) orally once a day

## 2020-07-06 DIAGNOSIS — Z79.51 LONG TERM (CURRENT) USE OF INHALED STEROIDS: ICD-10-CM

## 2020-07-06 DIAGNOSIS — J45.909 UNSPECIFIED ASTHMA, UNCOMPLICATED: ICD-10-CM

## 2020-07-06 DIAGNOSIS — E78.5 HYPERLIPIDEMIA, UNSPECIFIED: ICD-10-CM

## 2020-07-06 DIAGNOSIS — F41.9 ANXIETY DISORDER, UNSPECIFIED: ICD-10-CM

## 2020-07-06 DIAGNOSIS — C61 MALIGNANT NEOPLASM OF PROSTATE: ICD-10-CM

## 2020-07-06 DIAGNOSIS — Z88.5 ALLERGY STATUS TO NARCOTIC AGENT: ICD-10-CM

## 2020-07-08 ENCOUNTER — TRANSCRIPTION ENCOUNTER (OUTPATIENT)
Age: 65
End: 2020-07-08

## 2020-07-08 ENCOUNTER — APPOINTMENT (OUTPATIENT)
Dept: UROLOGY | Facility: CLINIC | Age: 65
End: 2020-07-08
Payer: COMMERCIAL

## 2020-07-08 VITALS
WEIGHT: 215 LBS | SYSTOLIC BLOOD PRESSURE: 141 MMHG | DIASTOLIC BLOOD PRESSURE: 93 MMHG | HEIGHT: 69 IN | OXYGEN SATURATION: 96 % | HEART RATE: 83 BPM | BODY MASS INDEX: 31.84 KG/M2 | TEMPERATURE: 97.6 F

## 2020-07-08 PROCEDURE — 99024 POSTOP FOLLOW-UP VISIT: CPT

## 2020-07-08 NOTE — PHYSICAL EXAM
[General Appearance - Well Developed] : well developed [Normal Appearance] : normal appearance [Well Groomed] : well groomed [General Appearance - Well Nourished] : well nourished [General Appearance - In No Acute Distress] : no acute distress [Abdomen Soft] : soft [Abdomen Tenderness] : non-tender [Costovertebral Angle Tenderness] : no ~M costovertebral angle tenderness [Urethral Meatus] : meatus normal [Urinary Bladder Findings] : the bladder was normal on palpation [Scrotum] : the scrotum was normal [Testes Mass (___cm)] : there were no testicular masses [Skin Color & Pigmentation] : normal skin color and pigmentation [Edema] : no peripheral edema [] : no respiratory distress [Respiration, Rhythm And Depth] : normal respiratory rhythm and effort [Exaggerated Use Of Accessory Muscles For Inspiration] : no accessory muscle use [Oriented To Time, Place, And Person] : oriented to person, place, and time [Affect] : the affect was normal [Mood] : the mood was normal [Not Anxious] : not anxious [Normal Station and Gait] : the gait and station were normal for the patient's age [No Focal Deficits] : no focal deficits [No Palpable Adenopathy] : no palpable adenopathy [FreeTextEntry1] : Hubbard

## 2020-07-08 NOTE — ASSESSMENT
[FreeTextEntry1] : Hubbard removed \par instructions for kegels given\par Path reviewed in detail\par Plan:\par PSA in 1 mo

## 2020-07-08 NOTE — HISTORY OF PRESENT ILLNESS
[FreeTextEntry1] : Here for visit post RALP\par Feels well\par Has no complaints\par Catheter in place for cath removal today\par clear urine\par Has normal BMs and appetite\par \par path:\par Final Diagnosis\par \par 1. 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 Lymph nodes (left pelvic):\par - Five lymph nodes negative for malignancy (0/5).\par - Multiple calcifications present.\par \par 3. Lymph nodes (right pelvic):\par - Four lymph nodes negative for malignancy (0/4).\par - Multiple calcifications present.

## 2020-07-13 PROBLEM — J45.909 UNSPECIFIED ASTHMA, UNCOMPLICATED: Chronic | Status: ACTIVE | Noted: 2020-06-23

## 2020-07-13 PROBLEM — E78.5 HYPERLIPIDEMIA, UNSPECIFIED: Chronic | Status: ACTIVE | Noted: 2020-06-23

## 2020-07-13 PROBLEM — C61 MALIGNANT NEOPLASM OF PROSTATE: Chronic | Status: ACTIVE | Noted: 2020-06-23

## 2020-07-13 PROBLEM — J30.2 OTHER SEASONAL ALLERGIC RHINITIS: Chronic | Status: ACTIVE | Noted: 2020-06-23

## 2020-07-13 PROBLEM — F41.9 ANXIETY DISORDER, UNSPECIFIED: Chronic | Status: ACTIVE | Noted: 2020-06-23

## 2020-08-04 LAB
PSA FREE FLD-MCNC: NORMAL %
PSA FREE SERPL-MCNC: <0.01 NG/ML
PSA SERPL-MCNC: <0.01 NG/ML

## 2020-08-05 ENCOUNTER — APPOINTMENT (OUTPATIENT)
Dept: UROLOGY | Facility: CLINIC | Age: 65
End: 2020-08-05
Payer: COMMERCIAL

## 2020-08-05 VITALS
HEIGHT: 69 IN | OXYGEN SATURATION: 95 % | WEIGHT: 209 LBS | SYSTOLIC BLOOD PRESSURE: 136 MMHG | BODY MASS INDEX: 30.96 KG/M2 | HEART RATE: 72 BPM | DIASTOLIC BLOOD PRESSURE: 83 MMHG | TEMPERATURE: 97 F

## 2020-08-05 PROCEDURE — 99024 POSTOP FOLLOW-UP VISIT: CPT

## 2020-08-08 NOTE — ASSESSMENT
[FreeTextEntry1] : Pt. doing well overall. \par PSA undetectable. \par Repeat PSA in 3 months. \par Next follow-up appointment in 3 months. \par

## 2020-08-08 NOTE — HISTORY OF PRESENT ILLNESS
[None] : no symptoms [FreeTextEntry1] : 65 yo presents today for a prostate cancer follow-up, s/p RALP 6.30.2020. Recent PSA <0.01 (8.3.2020).\par Pt. reports feeling well overall. No acute issues or concerns. Denies any leakage or dribbling of urine.

## 2020-08-08 NOTE — PHYSICAL EXAM
[Normal Appearance] : normal appearance [General Appearance - In No Acute Distress] : no acute distress [Abdomen Soft] : soft [Skin Color & Pigmentation] : normal skin color and pigmentation [Urethral Meatus] : meatus normal [Oriented To Time, Place, And Person] : oriented to person, place, and time [Affect] : the affect was normal [] : no respiratory distress [Edema] : no peripheral edema [Normal Station and Gait] : the gait and station were normal for the patient's age [No Focal Deficits] : no focal deficits [No Palpable Adenopathy] : no palpable adenopathy [Testes Mass (___cm)] : there were no testicular masses [Urinary Bladder Findings] : the bladder was normal on palpation [FreeTextEntry1] : abdominal incisions healed.  [Scrotum] : the scrotum was normal

## 2020-11-06 LAB — PSA SERPL-MCNC: <0.01 NG/ML

## 2020-11-11 ENCOUNTER — APPOINTMENT (OUTPATIENT)
Dept: UROLOGY | Facility: CLINIC | Age: 65
End: 2020-11-11
Payer: COMMERCIAL

## 2020-11-11 VITALS
OXYGEN SATURATION: 99 % | HEART RATE: 78 BPM | TEMPERATURE: 97 F | BODY MASS INDEX: 31.84 KG/M2 | SYSTOLIC BLOOD PRESSURE: 154 MMHG | WEIGHT: 215 LBS | HEIGHT: 69 IN | DIASTOLIC BLOOD PRESSURE: 88 MMHG

## 2020-11-11 PROCEDURE — 99213 OFFICE O/P EST LOW 20 MIN: CPT

## 2020-11-11 PROCEDURE — 99072 ADDL SUPL MATRL&STAF TM PHE: CPT

## 2020-11-11 NOTE — ASSESSMENT
[FreeTextEntry1] : 65 yo s/p RALP 6.30.2020. Patient is doing well. Most recent PSA <0.01 (11.5.20)\par PSA in 3 months\par Follow up in 3 months\par \par \par Pt seen with and case discussed with Dr. Abbott.\par

## 2020-11-11 NOTE — PHYSICAL EXAM

## 2020-11-11 NOTE — HISTORY OF PRESENT ILLNESS
[FreeTextEntry1] : 65 yo presents today for a prostate cancer follow-up, s/p RALP 6.30.2020. Patient is doing well, no urinary complaints. He is continent. Patient states he has not attempted to get an erection yet and is not interested in Viagra or Cialis at this time.\par PSA <0.01 (11.5.20)\par PSA <0.01 (8.3.20)\par

## 2020-12-22 ENCOUNTER — NON-APPOINTMENT (OUTPATIENT)
Age: 65
End: 2020-12-22

## 2020-12-22 ENCOUNTER — APPOINTMENT (OUTPATIENT)
Dept: INTERNAL MEDICINE | Facility: CLINIC | Age: 65
End: 2020-12-22
Payer: COMMERCIAL

## 2020-12-22 VITALS
BODY MASS INDEX: 31.4 KG/M2 | SYSTOLIC BLOOD PRESSURE: 140 MMHG | DIASTOLIC BLOOD PRESSURE: 80 MMHG | HEART RATE: 68 BPM | OXYGEN SATURATION: 96 % | TEMPERATURE: 98 F | HEIGHT: 69 IN | WEIGHT: 212 LBS

## 2020-12-22 DIAGNOSIS — Z23 ENCOUNTER FOR IMMUNIZATION: ICD-10-CM

## 2020-12-22 DIAGNOSIS — N40.1 BENIGN PROSTATIC HYPERPLASIA WITH LOWER URINARY TRACT SYMPMS: ICD-10-CM

## 2020-12-22 DIAGNOSIS — R35.1 BENIGN PROSTATIC HYPERPLASIA WITH LOWER URINARY TRACT SYMPMS: ICD-10-CM

## 2020-12-22 PROCEDURE — 99072 ADDL SUPL MATRL&STAF TM PHE: CPT

## 2020-12-22 PROCEDURE — G0009: CPT

## 2020-12-22 PROCEDURE — 36415 COLL VENOUS BLD VENIPUNCTURE: CPT

## 2020-12-22 PROCEDURE — 90732 PPSV23 VACC 2 YRS+ SUBQ/IM: CPT

## 2020-12-22 PROCEDURE — 93000 ELECTROCARDIOGRAM COMPLETE: CPT

## 2020-12-22 PROCEDURE — 99397 PER PM REEVAL EST PAT 65+ YR: CPT | Mod: 25

## 2020-12-22 RX ORDER — SERTRALINE HYDROCHLORIDE 100 MG/1
100 TABLET, FILM COATED ORAL
Qty: 90 | Refills: 0 | Status: COMPLETED | COMMUNITY
Start: 2019-05-20 | End: 2020-12-22

## 2020-12-22 NOTE — PLAN
[FreeTextEntry1] : Discussed the importance of communicating with me stopping medications\par Discussed the importance of STATIN therapy with high cholesterol\par Since pt was not sure if STATIN or Zoloft was making him feel "funny" he is willing to restart STATIN\par He will further improve diet and exercise \par EKG NSR @ 67 BPM \par Pt was given Pneumovax vaccination today.  Tolerated vaccination well with no acute reactions. Discussed vaccination with pt and answered all questions.\par

## 2020-12-22 NOTE — HEALTH RISK ASSESSMENT
[No falls in past year] : Patient reported no falls in the past year [Change in mental status noted] : No change in mental status noted [None] : None [With Significant Other] : lives with significant other [Retired] : retired [] :  [Fully functional (bathing, dressing, toileting, transferring, walking, feeding)] : Fully functional (bathing, dressing, toileting, transferring, walking, feeding) [Fully functional (using the telephone, shopping, preparing meals, housekeeping, doing laundry, using] : Fully functional and needs no help or supervision to perform IADLs (using the telephone, shopping, preparing meals, housekeeping, doing laundry, using transportation, managing medications and managing finances)

## 2020-12-22 NOTE — HISTORY OF PRESENT ILLNESS
[FreeTextEntry1] : 65 y/o male present today for a physical exam with fasting labs.  [de-identified] : Pt presents to the office today for CPE and FBW\par He has been feeling well overall\par He is doing well after his prostate surgery\par Diet has been fair\par exercise has been poor to fair\par He stopped taking Zoloft and Fluvastatin as he did not like the way he felt on the medications.  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

## 2020-12-30 ENCOUNTER — TRANSCRIPTION ENCOUNTER (OUTPATIENT)
Age: 65
End: 2020-12-30

## 2020-12-30 DIAGNOSIS — E87.8 OTHER DISORDERS OF ELECTROLYTE AND FLUID BALANCE, NOT ELSEWHERE CLASSIFIED: ICD-10-CM

## 2020-12-30 LAB
25(OH)D3 SERPL-MCNC: 24.4 NG/ML
ALBUMIN SERPL ELPH-MCNC: 4.8 G/DL
ALP BLD-CCNC: 118 U/L
ALT SERPL-CCNC: 50 U/L
ANION GAP SERPL CALC-SCNC: 24 MMOL/L
APPEARANCE: CLEAR
AST SERPL-CCNC: 39 U/L
BACTERIA: NEGATIVE
BASOPHILS # BLD AUTO: 0.04 K/UL
BASOPHILS NFR BLD AUTO: 0.7 %
BILIRUB SERPL-MCNC: 0.8 MG/DL
BILIRUBIN URINE: NEGATIVE
BLOOD URINE: NEGATIVE
BUN SERPL-MCNC: 21 MG/DL
CALCIUM SERPL-MCNC: 10.2 MG/DL
CHLORIDE SERPL-SCNC: 110 MMOL/L
CHOLEST SERPL-MCNC: 291 MG/DL
CO2 SERPL-SCNC: 16 MMOL/L
COLOR: YELLOW
CREAT SERPL-MCNC: 1.18 MG/DL
EOSINOPHIL # BLD AUTO: 0.13 K/UL
EOSINOPHIL NFR BLD AUTO: 2.1 %
ESTIMATED AVERAGE GLUCOSE: 120 MG/DL
GLUCOSE QUALITATIVE U: NEGATIVE
GLUCOSE SERPL-MCNC: 116 MG/DL
HBA1C MFR BLD HPLC: 5.8 %
HCT VFR BLD CALC: 49.9 %
HDLC SERPL-MCNC: 53 MG/DL
HGB BLD-MCNC: 16.2 G/DL
HYALINE CASTS: 1 /LPF
IMM GRANULOCYTES NFR BLD AUTO: 0.3 %
KETONES URINE: NEGATIVE
LDLC SERPL CALC-MCNC: 187 MG/DL
LEUKOCYTE ESTERASE URINE: NEGATIVE
LYMPHOCYTES # BLD AUTO: 1.26 K/UL
LYMPHOCYTES NFR BLD AUTO: 20.6 %
MAN DIFF?: NORMAL
MCHC RBC-ENTMCNC: 29.3 PG
MCHC RBC-ENTMCNC: 32.5 GM/DL
MCV RBC AUTO: 90.2 FL
MICROSCOPIC-UA: NORMAL
MONOCYTES # BLD AUTO: 0.61 K/UL
MONOCYTES NFR BLD AUTO: 10 %
NEUTROPHILS # BLD AUTO: 4.06 K/UL
NEUTROPHILS NFR BLD AUTO: 66.3 %
NITRITE URINE: NEGATIVE
NONHDLC SERPL-MCNC: 238 MG/DL
PH URINE: 5.5
PLATELET # BLD AUTO: 289 K/UL
POTASSIUM SERPL-SCNC: 6 MMOL/L
PROT SERPL-MCNC: 7.8 G/DL
PROTEIN URINE: NEGATIVE
RBC # BLD: 5.53 M/UL
RBC # FLD: 13 %
RED BLOOD CELLS URINE: 1 /HPF
SODIUM SERPL-SCNC: 150 MMOL/L
SPECIFIC GRAVITY URINE: 1.02
SQUAMOUS EPITHELIAL CELLS: 0 /HPF
TRIGL SERPL-MCNC: 258 MG/DL
TSH SERPL-ACNC: 1.51 UIU/ML
UROBILINOGEN URINE: NORMAL
VIT B12 SERPL-MCNC: 512 PG/ML
WBC # FLD AUTO: 6.12 K/UL
WHITE BLOOD CELLS URINE: 1 /HPF

## 2021-01-29 ENCOUNTER — APPOINTMENT (OUTPATIENT)
Dept: INTERNAL MEDICINE | Facility: CLINIC | Age: 66
End: 2021-01-29
Payer: COMMERCIAL

## 2021-01-29 VITALS
HEART RATE: 87 BPM | DIASTOLIC BLOOD PRESSURE: 80 MMHG | TEMPERATURE: 98.3 F | OXYGEN SATURATION: 96 % | SYSTOLIC BLOOD PRESSURE: 132 MMHG

## 2021-01-29 DIAGNOSIS — R09.89 OTHER SPECIFIED SYMPTOMS AND SIGNS INVOLVING THE CIRCULATORY AND RESPIRATORY SYSTEMS: ICD-10-CM

## 2021-01-29 DIAGNOSIS — Z03.818 ENCOUNTER FOR OBSERVATION FOR SUSPECTED EXPOSURE TO OTHER BIOLOGICAL AGENTS RULED OUT: ICD-10-CM

## 2021-01-29 PROCEDURE — 99214 OFFICE O/P EST MOD 30 MIN: CPT

## 2021-01-29 PROCEDURE — 99072 ADDL SUPL MATRL&STAF TM PHE: CPT

## 2021-02-01 ENCOUNTER — TRANSCRIPTION ENCOUNTER (OUTPATIENT)
Age: 66
End: 2021-02-01

## 2021-02-01 NOTE — HISTORY OF PRESENT ILLNESS
[FreeTextEntry8] : Presents to the office today with 3 days of congestion in sinuses and chest\par He has been using his Symbicort and albuterol\par Has been using over-the-counter allergy and cough medication

## 2021-02-01 NOTE — REVIEW OF SYSTEMS
[Nasal Discharge] : nasal discharge [Cough] : cough [Negative] : Cardiovascular [Fever] : fever [Fatigue] : fatigue [Shortness Of Breath] : no shortness of breath [Wheezing] : no wheezing [Headache] : headache [FreeTextEntry4] : sinus congestion

## 2021-02-01 NOTE — PLAN
[FreeTextEntry1] : Patient will start above medication and follow-up with me in 1 week if not feeling better\par Covid PCR testing done today for screening measures

## 2021-02-02 LAB — SARS-COV-2 N GENE NPH QL NAA+PROBE: DETECTED

## 2021-02-03 ENCOUNTER — APPOINTMENT (OUTPATIENT)
Dept: DISASTER EMERGENCY | Facility: HOSPITAL | Age: 66
End: 2021-02-03

## 2021-02-03 ENCOUNTER — OUTPATIENT (OUTPATIENT)
Dept: OUTPATIENT SERVICES | Facility: HOSPITAL | Age: 66
LOS: 1 days | End: 2021-02-03
Payer: COMMERCIAL

## 2021-02-03 VITALS
SYSTOLIC BLOOD PRESSURE: 130 MMHG | RESPIRATION RATE: 18 BRPM | HEIGHT: 69 IN | DIASTOLIC BLOOD PRESSURE: 85 MMHG | HEART RATE: 82 BPM | WEIGHT: 207.01 LBS | OXYGEN SATURATION: 96 % | TEMPERATURE: 98 F

## 2021-02-03 VITALS
SYSTOLIC BLOOD PRESSURE: 159 MMHG | RESPIRATION RATE: 18 BRPM | HEART RATE: 80 BPM | OXYGEN SATURATION: 96 % | TEMPERATURE: 98 F | DIASTOLIC BLOOD PRESSURE: 91 MMHG

## 2021-02-03 DIAGNOSIS — Z98.890 OTHER SPECIFIED POSTPROCEDURAL STATES: Chronic | ICD-10-CM

## 2021-02-03 DIAGNOSIS — U07.1 COVID-19: ICD-10-CM

## 2021-02-03 PROCEDURE — M0239: CPT

## 2021-02-03 RX ORDER — BAMLANIVIMAB 35 MG/ML
700 INJECTION, SOLUTION INTRAVENOUS ONCE
Refills: 0 | Status: COMPLETED | OUTPATIENT
Start: 2021-02-03 | End: 2021-02-03

## 2021-02-03 RX ORDER — SODIUM CHLORIDE 9 MG/ML
250 INJECTION INTRAMUSCULAR; INTRAVENOUS; SUBCUTANEOUS
Refills: 0 | Status: DISCONTINUED | OUTPATIENT
Start: 2021-02-03 | End: 2021-02-18

## 2021-02-03 RX ADMIN — BAMLANIVIMAB 270 MILLIGRAM(S): 35 INJECTION, SOLUTION INTRAVENOUS at 15:01

## 2021-02-03 RX ADMIN — SODIUM CHLORIDE 25 MILLILITER(S): 9 INJECTION INTRAMUSCULAR; INTRAVENOUS; SUBCUTANEOUS at 15:02

## 2021-02-03 NOTE — CHART NOTE - NSCHARTNOTEFT_GEN_A_CORE
CC: Monoclonal Antibody Infusion/COVID 19 Positive  66yo Male with PMHx of anxiety, asthma, HLD, diverticulosis, prostate Ca and symptoms of "a sinus infection" including sinus pressure    exam/findings:  T(C): 36.8 (02-03-21 @ 14:10), Max: 36.8 (02-03-21 @ 14:10)  HR: 82 (02-03-21 @ 14:10) (82 - 82)  BP: 130/85 (02-03-21 @ 14:10) (130/85 - 130/85)  RR: 18 (02-03-21 @ 14:10) (18 - 18)  SpO2: 96% (02-03-21 @ 14:10) (96% - 96%)      PE:   Appearance: NAD	  Cardiovascular: Normal S1 S2, No JVD, No murmurs, No edema  Respiratory: Lungs clear to auscultation	  Gastrointestinal:  Soft, Non-tender, + BS	  Skin: warm and dry  Neurologic: Non-focal  Extremities: Normal range of motion, no calf tenderness or edema    ASSESSMENT:  Pt is a  64 y/o M who tested Covid 19 Positive on 1/29/21,   referred by Dr. Rivas who presents to infusion center for Monoclonal antibody infusion Bamlanivimab  Symptoms/ Criteria: symptoms of "a sinus infection" including sinus pressure  Risk Profile includes: 66yo Male with PMHx of anxiety, asthma, HLD, diverticulosis, prostate Ca     PLAN:  - infusion procedure explained to patient   -Consent for monoclonal antibody infusion obtained   - Risk & benifits discussed/all questions answered  -infuse   Bamlanivimab 700mg IV over one hour   -observe patient for one hour post infusion        I have reviewed the Bamlanivimab Emergency Use Authorization (EAU) and I have provided the patient or patient's caregiver with the following information:  1. FDA has authorized emergency use of Bamlanivimab, which is not FDA-approved biologic product.  2. The patient or patient's caregiver has the option to accept or refuse administration of Bamlanivimab  3. The significant known and benefits are unknown.  4. Information on available alternative treatments and risks and benefits of those alternatives.    Discharge:  Patient tolerated infusion well denies complaints of chest pain/SOB/dizziness/ palps  Vital signs --- for discharge home ---  D/C instructions given/ fact sheet included.  Patient to follow-up with PCP as needed. CC: Monoclonal Antibody Infusion/COVID 19 Positive  64yo Male with PMHx of anxiety, asthma, HLD, diverticulosis, prostate Ca and symptoms of "a sinus infection" including sinus pressure    exam/findings:  T(C): 36.8 (02-03-21 @ 14:10), Max: 36.8 (02-03-21 @ 14:10)  HR: 82 (02-03-21 @ 14:10) (82 - 82)  BP: 130/85 (02-03-21 @ 14:10) (130/85 - 130/85)  RR: 18 (02-03-21 @ 14:10) (18 - 18)  SpO2: 96% (02-03-21 @ 14:10) (96% - 96%)      PE:   Appearance: NAD	  Cardiovascular: Normal S1 S2, No JVD, No murmurs, No edema  Respiratory: Lungs clear to auscultation	  Gastrointestinal:  Soft, Non-tender, + BS	  Skin: warm and dry  Neurologic: Non-focal  Extremities: Normal range of motion, no calf tenderness or edema    ASSESSMENT:  Pt is a  66 y/o M who tested Covid 19 Positive on 1/29/21,   referred by Dr. Rivas who presents to infusion center for Monoclonal antibody infusion Bamlanivimab  Symptoms/ Criteria: symptoms of "a sinus infection" including sinus pressure  Risk Profile includes: 64yo Male with PMHx of anxiety, asthma, HLD, diverticulosis, prostate Ca     PLAN:  - infusion procedure explained to patient   -Consent for monoclonal antibody infusion obtained   - Risk & benifits discussed/all questions answered  -infuse   Bamlanivimab 700mg IV over one hour   -observe patient for one hour post infusion        I have reviewed the Bamlanivimab Emergency Use Authorization (EAU) and I have provided the patient or patient's caregiver with the following information:  1. FDA has authorized emergency use of Bamlanivimab, which is not FDA-approved biologic product.  2. The patient or patient's caregiver has the option to accept or refuse administration of Bamlanivimab  3. The significant known and benefits are unknown.  4. Information on available alternative treatments and risks and benefits of those alternatives.    Discharge:  Patient tolerated infusion well denies complaints of chest pain/SOB/dizziness/ palps  Vital signs stable for discharge home 17:10  D/C instructions given/ fact sheet included.  Patient to follow-up with PCP as needed.

## 2021-02-04 ENCOUNTER — TRANSCRIPTION ENCOUNTER (OUTPATIENT)
Age: 66
End: 2021-02-04

## 2021-02-10 ENCOUNTER — APPOINTMENT (OUTPATIENT)
Dept: INTERNAL MEDICINE | Facility: CLINIC | Age: 66
End: 2021-02-10
Payer: COMMERCIAL

## 2021-02-10 VITALS
OXYGEN SATURATION: 97 % | BODY MASS INDEX: 30.66 KG/M2 | SYSTOLIC BLOOD PRESSURE: 142 MMHG | DIASTOLIC BLOOD PRESSURE: 82 MMHG | WEIGHT: 207 LBS | TEMPERATURE: 97.2 F | HEIGHT: 69 IN | HEART RATE: 84 BPM

## 2021-02-10 DIAGNOSIS — U07.1 COVID-19: ICD-10-CM

## 2021-02-10 DIAGNOSIS — J02.9 ACUTE PHARYNGITIS, UNSPECIFIED: ICD-10-CM

## 2021-02-10 DIAGNOSIS — J31.0 CHRONIC RHINITIS: ICD-10-CM

## 2021-02-10 PROCEDURE — 99072 ADDL SUPL MATRL&STAF TM PHE: CPT

## 2021-02-10 PROCEDURE — 99214 OFFICE O/P EST MOD 30 MIN: CPT

## 2021-02-10 RX ORDER — MOMETASONE 50 UG/1
50 SPRAY, METERED NASAL
Qty: 1 | Refills: 0 | Status: ACTIVE | COMMUNITY
Start: 2021-02-10 | End: 1900-01-01

## 2021-02-10 RX ORDER — METHYLPREDNISOLONE 4 MG/1
4 TABLET ORAL
Qty: 10 | Refills: 0 | Status: COMPLETED | COMMUNITY
Start: 2021-01-29 | End: 2021-02-10

## 2021-02-10 RX ORDER — MOMETASONE FUROATE AND FORMOTEROL FUMARATE DIHYDRATE 50; 5 UG/1; UG/1
AEROSOL RESPIRATORY (INHALATION)
Refills: 0 | Status: COMPLETED | COMMUNITY
End: 2021-02-10

## 2021-02-10 RX ORDER — BUDESONIDE AND FORMOTEROL FUMARATE DIHYDRATE 160; 4.5 UG/1; UG/1
160-4.5 AEROSOL RESPIRATORY (INHALATION)
Refills: 0 | Status: ACTIVE | COMMUNITY

## 2021-02-10 RX ORDER — AZITHROMYCIN 250 MG/1
250 TABLET, FILM COATED ORAL
Qty: 1 | Refills: 0 | Status: COMPLETED | COMMUNITY
Start: 2021-01-29 | End: 2021-02-10

## 2021-02-10 RX ORDER — FAMOTIDINE 20 MG/1
20 TABLET, FILM COATED ORAL AT BEDTIME
Qty: 30 | Refills: 0 | Status: ACTIVE | COMMUNITY
Start: 2021-02-10 | End: 1900-01-01

## 2021-02-11 NOTE — PHYSICAL EXAM
[Normal] : normal rate, regular rhythm, normal S1 and S2 and no murmur heard [de-identified] : Mild nasal mucosa erythema and turbinate hypertrophy.  Oropharynx mild erythema no exudates

## 2021-02-11 NOTE — HISTORY OF PRESENT ILLNESS
[de-identified] : Patient presents to the office status post Covid and monoclonal antibody infusion\par Patient is feeling much better\par He still using his current medications is complaining of mild nasal congestion as well as mild throat discomfort\par Patient has not been rinsing mouth out after using Symbicort that he has been on previous to Covid

## 2021-02-11 NOTE — PLAN
[FreeTextEntry1] : Patient will start above nasal spray for nasal mucosa erythema and swelling\par Patient advised when using Symbicort he needs to rinse his mouth out gargle and spit\par It appears to possibly have mild version of reflux patient will take Pepcid at bedtime\par Patient will follow up with me in 1 week if symptoms have not improved with above treatment

## 2021-02-14 ENCOUNTER — TRANSCRIPTION ENCOUNTER (OUTPATIENT)
Age: 66
End: 2021-02-14

## 2021-02-16 LAB
PSA FREE FLD-MCNC: NORMAL %
PSA FREE SERPL-MCNC: <0.01 NG/ML
PSA SERPL-MCNC: <0.01 NG/ML

## 2021-03-05 ENCOUNTER — RX CHANGE (OUTPATIENT)
Age: 66
End: 2021-03-05

## 2021-03-30 ENCOUNTER — APPOINTMENT (OUTPATIENT)
Dept: WOUND CARE | Facility: HOSPITAL | Age: 66
End: 2021-03-30
Payer: COMMERCIAL

## 2021-03-30 ENCOUNTER — OUTPATIENT (OUTPATIENT)
Dept: OUTPATIENT SERVICES | Facility: HOSPITAL | Age: 66
LOS: 1 days | Discharge: HOME | End: 2021-03-30
Payer: COMMERCIAL

## 2021-03-30 VITALS
BODY MASS INDEX: 30.66 KG/M2 | DIASTOLIC BLOOD PRESSURE: 95 MMHG | SYSTOLIC BLOOD PRESSURE: 159 MMHG | HEIGHT: 69 IN | RESPIRATION RATE: 20 BRPM | HEART RATE: 69 BPM | WEIGHT: 207 LBS | OXYGEN SATURATION: 97 % | TEMPERATURE: 97.6 F

## 2021-03-30 DIAGNOSIS — B35.1 TINEA UNGUIUM: ICD-10-CM

## 2021-03-30 DIAGNOSIS — L60.0 INGROWING NAIL: ICD-10-CM

## 2021-03-30 DIAGNOSIS — Z98.890 OTHER SPECIFIED POSTPROCEDURAL STATES: Chronic | ICD-10-CM

## 2021-03-30 PROCEDURE — G0463: CPT

## 2021-03-30 PROCEDURE — 99203 OFFICE O/P NEW LOW 30 MIN: CPT

## 2021-03-30 RX ORDER — CICLOPIROX 80 MG/ML
8 SOLUTION TOPICAL
Qty: 1 | Refills: 3 | Status: COMPLETED | COMMUNITY
Start: 2021-03-30 | End: 2021-07-28

## 2021-03-30 NOTE — PHYSICAL EXAM
[2+] : left 2+ [Ankle Swelling (On Exam)] : not present [Varicose Veins Of Lower Extremities] : not present [] : not present [de-identified] : A&Ox3, NAD [de-identified] : 5/5 strength in all quadrants bilatearlly [de-identified] : right hallux incurvated nail lateral border, discolored and thickened [de-identified] : nail cut by EVANGELINA [FreeTextEntry1] : Hallux Nail bed  [de-identified] : Cleansed with Normal saline\par \par  [TWNoteComboBox1] : Right

## 2021-03-30 NOTE — REVIEW OF SYSTEMS
[Fever] : no fever [Eye Pain] : no eye pain [Earache] : no earache [Chest Pain] : no chest pain [Shortness Of Breath] : no shortness of breath [Cough] : no cough [Abdominal Pain] : no abdominal pain [de-identified] : right hallux incurvated nail, discolored and thickened

## 2021-03-30 NOTE — PLAN
[FreeTextEntry1] : Patient examined and evaluated at this time.\par Continue local wound care and offloading.\par Discussed etiology of symptoms and treatment options.\par Patient relates he will pursue topical antifungal medications.\par Incurvated corner of nail clipped and patient tolerated well.\par Will auth for partial nail avulsion.\par Spent 30 minutes for patient care and medical decision making.\par

## 2021-03-30 NOTE — HISTORY OF PRESENT ILLNESS
[FreeTextEntry1] : 65 year old male presents to the [facility] with a right hallux ingrown nail. THe patient reports that he has been experiencing more issues with the nail since January 2021. He followed up with a podiatrist who offered surgery. The patient then decided to seek another opinion.

## 2021-03-30 NOTE — VITALS
[Tender] : tender [Pain related to present condition?] : The patient's  pain is not related to present condition. [] : No [de-identified] : 8 [FreeTextEntry5] : Initial visit

## 2021-03-30 NOTE — ASSESSMENT
[Verbal] : Verbal [Patient] : Patient [Spouse] : Spouse [Fair - mild discomfort, physical impairment, low acceptance] : Fair - mild discomfort, physical impairment, low acceptance [Verbalizes knowledge/Understanding] : Verbalizes knowledge/understanding [Dressing changes] : dressing changes [Foot Care] : foot care [Signs and symptoms of infection] : sign and symptoms of infection [How and When to Call] : how and when to call [Patient responsibility to plan of care] : patient responsibility to plan of care [Stable] : stable [Home] : Home [Ambulatory] : Ambulatory [Not Applicable - Long Term Care/Home Health Agency] : Long Term Care/Home Health Agency: Not Applicable [] : No [FreeTextEntry2] : Infection Control\par Antifungal\par Nail avulsion\par F/U 1 month\par  [FreeTextEntry3] : Initial visit  [FreeTextEntry4] : DPM escribed Ciclopirox, patient is aware of need to  prescription\par DPM ordered a nail avulsion at next [facility] visit. Authorization submitted today\par F/U 1 month

## 2021-04-01 DIAGNOSIS — Z98.890 OTHER SPECIFIED POSTPROCEDURAL STATES: ICD-10-CM

## 2021-04-01 DIAGNOSIS — E55.9 VITAMIN D DEFICIENCY, UNSPECIFIED: ICD-10-CM

## 2021-04-01 DIAGNOSIS — Z86.16 PERSONAL HISTORY OF COVID-19: ICD-10-CM

## 2021-04-01 DIAGNOSIS — B35.1 TINEA UNGUIUM: ICD-10-CM

## 2021-04-01 DIAGNOSIS — E78.00 PURE HYPERCHOLESTEROLEMIA, UNSPECIFIED: ICD-10-CM

## 2021-04-01 DIAGNOSIS — L60.0 INGROWING NAIL: ICD-10-CM

## 2021-04-01 DIAGNOSIS — E53.8 DEFICIENCY OF OTHER SPECIFIED B GROUP VITAMINS: ICD-10-CM

## 2021-04-01 DIAGNOSIS — Z85.46 PERSONAL HISTORY OF MALIGNANT NEOPLASM OF PROSTATE: ICD-10-CM

## 2021-04-01 DIAGNOSIS — Z90.79 ACQUIRED ABSENCE OF OTHER GENITAL ORGAN(S): ICD-10-CM

## 2021-04-01 DIAGNOSIS — Z79.899 OTHER LONG TERM (CURRENT) DRUG THERAPY: ICD-10-CM

## 2021-04-01 DIAGNOSIS — I65.29 OCCLUSION AND STENOSIS OF UNSPECIFIED CAROTID ARTERY: ICD-10-CM

## 2021-04-01 DIAGNOSIS — F41.9 ANXIETY DISORDER, UNSPECIFIED: ICD-10-CM

## 2021-04-01 DIAGNOSIS — J45.909 UNSPECIFIED ASTHMA, UNCOMPLICATED: ICD-10-CM

## 2021-04-01 DIAGNOSIS — Z86.010 PERSONAL HISTORY OF COLONIC POLYPS: ICD-10-CM

## 2021-04-01 DIAGNOSIS — Z83.511 FAMILY HISTORY OF GLAUCOMA: ICD-10-CM

## 2021-04-01 DIAGNOSIS — Z80.1 FAMILY HISTORY OF MALIGNANT NEOPLASM OF TRACHEA, BRONCHUS AND LUNG: ICD-10-CM

## 2021-04-01 DIAGNOSIS — Z82.49 FAMILY HISTORY OF ISCHEMIC HEART DISEASE AND OTHER DISEASES OF THE CIRCULATORY SYSTEM: ICD-10-CM

## 2021-05-11 DIAGNOSIS — C61 MALIGNANT NEOPLASM OF PROSTATE: ICD-10-CM

## 2021-05-17 LAB — PSA SERPL-MCNC: <0.01 NG/ML

## 2021-05-19 ENCOUNTER — APPOINTMENT (OUTPATIENT)
Dept: UROLOGY | Facility: CLINIC | Age: 66
End: 2021-05-19

## 2021-06-01 DIAGNOSIS — C61 MALIGNANT NEOPLASM OF PROSTATE: ICD-10-CM

## 2021-06-02 ENCOUNTER — APPOINTMENT (OUTPATIENT)
Dept: UROLOGY | Facility: CLINIC | Age: 66
End: 2021-06-02
Payer: COMMERCIAL

## 2021-06-02 VITALS
TEMPERATURE: 97.3 F | SYSTOLIC BLOOD PRESSURE: 161 MMHG | BODY MASS INDEX: 31.55 KG/M2 | WEIGHT: 213 LBS | HEART RATE: 67 BPM | OXYGEN SATURATION: 96 % | HEIGHT: 69 IN | DIASTOLIC BLOOD PRESSURE: 98 MMHG

## 2021-06-02 PROCEDURE — 99213 OFFICE O/P EST LOW 20 MIN: CPT

## 2021-06-02 PROCEDURE — 99072 ADDL SUPL MATRL&STAF TM PHE: CPT

## 2021-06-02 NOTE — HISTORY OF PRESENT ILLNESS
[None] : no symptoms [FreeTextEntry1] : 63 yo presents today for a prostate cancer follow-up, s/p RALP 6.30.2020. Patient is doing well, no urinary complaints. He is continent. Patient states he has not attempted to get an erection yet and is not interested in Viagra or Cialis at this time.\par PSA is undetectable\par

## 2021-06-02 NOTE — PHYSICAL EXAM
[General Appearance - Well Developed] : well developed [General Appearance - Well Nourished] : well nourished [Normal Appearance] : normal appearance [Well Groomed] : well groomed [General Appearance - In No Acute Distress] : no acute distress [] : no respiratory distress [Respiration, Rhythm And Depth] : normal respiratory rhythm and effort [Exaggerated Use Of Accessory Muscles For Inspiration] : no accessory muscle use [Oriented To Time, Place, And Person] : oriented to person, place, and time [Affect] : the affect was normal [Mood] : the mood was normal [Not Anxious] : not anxious [Normal Station and Gait] : the gait and station were normal for the patient's age [No Focal Deficits] : no focal deficits [Abdomen Soft] : soft [Abdomen Tenderness] : non-tender [Costovertebral Angle Tenderness] : no ~M costovertebral angle tenderness [FreeTextEntry1] : wounds clean [Urethral Meatus] : meatus normal [Urinary Bladder Findings] : the bladder was normal on palpation [Scrotum] : the scrotum was normal [Testes Mass (___cm)] : there were no testicular masses

## 2021-06-02 NOTE — ASSESSMENT
[FreeTextEntry1] : 63 yo s/p RALP 6.30.2020. Patient is doing well. Most recent PSA <0.01\par \par PSA in 3 months\par Follow up in 3 months\par 20 minute discussion regarding prostate cancer followup, functional outcomes and review of labs\par \par

## 2021-09-14 LAB — PSA SERPL-MCNC: <0.01 NG/ML

## 2021-09-16 ENCOUNTER — NON-APPOINTMENT (OUTPATIENT)
Age: 66
End: 2021-09-16

## 2021-09-21 ENCOUNTER — APPOINTMENT (OUTPATIENT)
Dept: INTERNAL MEDICINE | Facility: CLINIC | Age: 66
End: 2021-09-21
Payer: COMMERCIAL

## 2021-09-21 VITALS
HEART RATE: 91 BPM | TEMPERATURE: 97.8 F | WEIGHT: 213 LBS | OXYGEN SATURATION: 96 % | HEIGHT: 69 IN | DIASTOLIC BLOOD PRESSURE: 100 MMHG | BODY MASS INDEX: 31.55 KG/M2 | SYSTOLIC BLOOD PRESSURE: 160 MMHG

## 2021-09-21 DIAGNOSIS — H65.90 UNSPECIFIED NONSUPPURATIVE OTITIS MEDIA, UNSPECIFIED EAR: ICD-10-CM

## 2021-09-21 PROCEDURE — 99213 OFFICE O/P EST LOW 20 MIN: CPT

## 2021-09-21 NOTE — PHYSICAL EXAM
[Normal Outer Ear/Nose] : the outer ears and nose were normal in appearance [Normal Oropharynx] : the oropharynx was normal [Normal] : no posterior cervical lymphadenopathy and no anterior cervical lymphadenopathy [de-identified] : TM intact bilaterally no erythema bilateral bulging.  Nasal mucosa mild erythema and swelling

## 2021-09-21 NOTE — HISTORY OF PRESENT ILLNESS
[FreeTextEntry8] : 66 y/o male presents to the office today with dizziness and ringing in the ears.\par States that he feels he has pressure in his ears but when you come down from a plane.  Starting his right ear than travel to his left ear\par Intermittent ringing for the last 2 to 3 weeks\par Patient tried taking a Sudafed and did help with his symptoms only took 1 dose\par Patient made appointment with ENT been set up for VNG testing\par

## 2021-09-21 NOTE — PLAN
[FreeTextEntry1] : Pt will start above treatment and follow up with me on how he is feeling\par He will continue OTC nasal spray as well\par RTO for CPE

## 2021-09-22 ENCOUNTER — APPOINTMENT (OUTPATIENT)
Dept: UROLOGY | Facility: CLINIC | Age: 66
End: 2021-09-22
Payer: COMMERCIAL

## 2021-09-22 VITALS
TEMPERATURE: 98.2 F | BODY MASS INDEX: 31.55 KG/M2 | OXYGEN SATURATION: 96 % | HEIGHT: 69 IN | WEIGHT: 213 LBS | DIASTOLIC BLOOD PRESSURE: 90 MMHG | HEART RATE: 83 BPM | SYSTOLIC BLOOD PRESSURE: 148 MMHG

## 2021-09-22 PROCEDURE — 99213 OFFICE O/P EST LOW 20 MIN: CPT

## 2021-09-22 NOTE — HISTORY OF PRESENT ILLNESS
[FreeTextEntry1] : Here post RALP june 2020\par Feels well no complaints \par Urinating well continent

## 2021-09-22 NOTE — PHYSICAL EXAM
[General Appearance - Well Developed] : well developed [General Appearance - Well Nourished] : well nourished [Normal Appearance] : normal appearance [Well Groomed] : well groomed [General Appearance - In No Acute Distress] : no acute distress [Abdomen Soft] : soft [Abdomen Tenderness] : non-tender [Costovertebral Angle Tenderness] : no ~M costovertebral angle tenderness [FreeTextEntry1] : wounds clean [Urethral Meatus] : meatus normal [Urinary Bladder Findings] : the bladder was normal on palpation [Scrotum] : the scrotum was normal [Testes Mass (___cm)] : there were no testicular masses [No Prostate Nodules] : no prostate nodules [Edema] : no peripheral edema [] : no respiratory distress [Respiration, Rhythm And Depth] : normal respiratory rhythm and effort [Exaggerated Use Of Accessory Muscles For Inspiration] : no accessory muscle use [Oriented To Time, Place, And Person] : oriented to person, place, and time [Affect] : the affect was normal [Mood] : the mood was normal [Not Anxious] : not anxious [Normal Station and Gait] : the gait and station were normal for the patient's age [No Focal Deficits] : no focal deficits [No Palpable Adenopathy] : no palpable adenopathy

## 2021-09-22 NOTE — ASSESSMENT
[FreeTextEntry1] : discussed PSA results\par Ordered repeat PSA and followup\par 20 minute discussion regarding prostate cancer followup, functional outcomes and review of labs\par

## 2021-09-25 ENCOUNTER — TRANSCRIPTION ENCOUNTER (OUTPATIENT)
Age: 66
End: 2021-09-25

## 2021-10-25 ENCOUNTER — TRANSCRIPTION ENCOUNTER (OUTPATIENT)
Age: 66
End: 2021-10-25

## 2021-10-29 ENCOUNTER — APPOINTMENT (OUTPATIENT)
Dept: INTERNAL MEDICINE | Facility: CLINIC | Age: 66
End: 2021-10-29

## 2021-11-04 NOTE — H&P PST ADULT - BP NONINVASIVE SYSTOLIC (MM HG)
Goal Outcome Evaluation:  Plan of Care Reviewed With: patient           Outcome Summary: POD 1 L3-5 lami w/ fusion by Dr. Webster.  Pt reports normally independent, no use of AD, some recent falls due to LLE giving.  Pt reports legs already feel better/ more stable after surgery.  Today pt demonstrates generlized post op weakness, impaired gait pattern and ind. w/ mobility from baseline but able to ambulate 100' w/ contact assist using RW; slow guarded gait pattern.  Educated on spinal precautions, activity/walking recommendations; also discussed pt not sitting in a chair unless Dr. Webster reports okay.  ANticipate DC to home w assist as needed, possible need for RW, but anticipating progress.   140

## 2021-11-08 ENCOUNTER — TRANSCRIPTION ENCOUNTER (OUTPATIENT)
Age: 66
End: 2021-11-08

## 2022-01-11 ENCOUNTER — RX CHANGE (OUTPATIENT)
Age: 67
End: 2022-01-11

## 2022-01-14 NOTE — REASON FOR VISIT
[Follow-up Visit ___] : a follow-up visit  for [unfilled] [Feeling Tired] : feeling tired [Eyesight Problems] : eyesight problems [Sore Throat] : sore throat [Chest Pain] : chest pain [Diarrhea] : diarrhea [Heartburn] : heartburn [Anxiety] : anxiety [Depression] : depression [Negative] : Heme/Lymph

## 2022-01-17 LAB
PSA FREE FLD-MCNC: NORMAL %
PSA FREE SERPL-MCNC: <0.01 NG/ML
PSA SERPL-MCNC: <0.01 NG/ML

## 2022-01-19 ENCOUNTER — APPOINTMENT (OUTPATIENT)
Dept: UROLOGY | Facility: CLINIC | Age: 67
End: 2022-01-19
Payer: COMMERCIAL

## 2022-01-19 VITALS
SYSTOLIC BLOOD PRESSURE: 165 MMHG | WEIGHT: 212 LBS | DIASTOLIC BLOOD PRESSURE: 92 MMHG | OXYGEN SATURATION: 97 % | HEIGHT: 69 IN | HEART RATE: 74 BPM | BODY MASS INDEX: 31.4 KG/M2

## 2022-01-19 PROCEDURE — 99213 OFFICE O/P EST LOW 20 MIN: CPT

## 2022-01-21 ENCOUNTER — APPOINTMENT (OUTPATIENT)
Dept: INTERNAL MEDICINE | Facility: CLINIC | Age: 67
End: 2022-01-21

## 2022-01-21 NOTE — PHYSICAL EXAM
[Normal Appearance] : normal appearance [Well Groomed] : well groomed [General Appearance - In No Acute Distress] : no acute distress [Abdomen Soft] : soft [Urethral Meatus] : meatus normal [Skin Color & Pigmentation] : normal skin color and pigmentation [Edema] : no peripheral edema [] : no respiratory distress [Oriented To Time, Place, And Person] : oriented to person, place, and time [Affect] : the affect was normal [Normal Station and Gait] : the gait and station were normal for the patient's age [No Palpable Adenopathy] : no palpable adenopathy

## 2022-01-22 NOTE — ASSESSMENT
[FreeTextEntry1] : PSA remains undetectable. \par \par Next PSA in 4 months.\par Next follow-up in 4 months. \par 20 minute discussion regarding prostate cancer followup, functional outcomes and review of labs\par

## 2022-01-22 NOTE — HISTORY OF PRESENT ILLNESS
[None] : no symptoms [FreeTextEntry1] : 65 yo presents today for a follow-up appointment for prostate cancer. \par s/p RALP 6/2020.\par Recent PSA <0.01 (1.12.22.)\par Pt. is doing well overall.\par No acute urological issues at time of visit.

## 2022-02-23 ENCOUNTER — APPOINTMENT (OUTPATIENT)
Dept: INTERNAL MEDICINE | Facility: CLINIC | Age: 67
End: 2022-02-23
Payer: COMMERCIAL

## 2022-02-23 ENCOUNTER — NON-APPOINTMENT (OUTPATIENT)
Age: 67
End: 2022-02-23

## 2022-02-23 VITALS
OXYGEN SATURATION: 96 % | DIASTOLIC BLOOD PRESSURE: 90 MMHG | HEART RATE: 71 BPM | SYSTOLIC BLOOD PRESSURE: 140 MMHG | WEIGHT: 207 LBS | BODY MASS INDEX: 30.66 KG/M2 | TEMPERATURE: 98.3 F | HEIGHT: 69 IN

## 2022-02-23 VITALS — SYSTOLIC BLOOD PRESSURE: 142 MMHG | DIASTOLIC BLOOD PRESSURE: 82 MMHG

## 2022-02-23 PROCEDURE — 99397 PER PM REEVAL EST PAT 65+ YR: CPT | Mod: 25

## 2022-02-23 PROCEDURE — 93000 ELECTROCARDIOGRAM COMPLETE: CPT

## 2022-02-23 PROCEDURE — 36415 COLL VENOUS BLD VENIPUNCTURE: CPT

## 2022-02-23 RX ORDER — PSEUDOEPHEDRINE HYDROCHLORIDE 120 MG/1
120 TABLET ORAL
Qty: 5 | Refills: 0 | Status: COMPLETED | COMMUNITY
Start: 2021-09-21 | End: 2022-02-23

## 2022-02-23 RX ORDER — METHYLPREDNISOLONE 4 MG/1
4 TABLET ORAL
Qty: 10 | Refills: 0 | Status: COMPLETED | COMMUNITY
Start: 2021-09-21 | End: 2022-02-23

## 2022-02-23 NOTE — HEALTH RISK ASSESSMENT
[Good] : ~his/her~  mood as  good [No] : No [No falls in past year] : Patient reported no falls in the past year [PHQ-2 Negative - No further assessment needed] : PHQ-2 Negative - No further assessment needed [None] : None [With Significant Other] : lives with significant other [] :  [Fully functional (bathing, dressing, toileting, transferring, walking, feeding)] : Fully functional (bathing, dressing, toileting, transferring, walking, feeding) [Fully functional (using the telephone, shopping, preparing meals, housekeeping, doing laundry, using] : Fully functional and needs no help or supervision to perform IADLs (using the telephone, shopping, preparing meals, housekeeping, doing laundry, using transportation, managing medications and managing finances) [[id number]] : 0 [Change in mental status noted] : No change in mental status noted [Language] : denies difficulty with language [Behavior] : denies difficulty with behavior [Reports changes in hearing] : Reports no changes in hearing [Reports changes in vision] : Reports no changes in vision [Reports changes in dental health] : Reports no changes in dental health

## 2022-02-23 NOTE — HISTORY OF PRESENT ILLNESS
[FreeTextEntry1] : 65 y/o male presents to the office today for a physical exam with fasting labs.  [de-identified] : Pt presents to the office today for CPE and FBW\par Pt has recently been to ENT and currently on Clarithromycin for sinusitis.  They advised him to stop his cholesterol medication while on abx so today's fasting blood work maybe elevated \par Pt has seen Dermatology a few week ago for skin cancer screen\par Has been routinely seeing Urology \par Has appointment with GI at the end of the month for colonoscopy\par Diet has been doing better exercise has been poor \par EKG SR @ 68 BPM

## 2022-02-23 NOTE — PLAN
[FreeTextEntry1] : BP slightly elevated and pt states it has been so he will start Losartan 50mg daily\par FBW done in office today\par Cholesterol will not be arcuate as pt has been off his medication because of abx treatment\par Follow up in 3 months BP check and FBW on cholesterol medication \par Further improvement with diet and exercise \par Colonoscopy appointment made

## 2022-03-08 ENCOUNTER — TRANSCRIPTION ENCOUNTER (OUTPATIENT)
Age: 67
End: 2022-03-08

## 2022-03-08 LAB
25(OH)D3 SERPL-MCNC: 26.2 NG/ML
ALBUMIN SERPL ELPH-MCNC: 5 G/DL
ALP BLD-CCNC: 99 U/L
ALT SERPL-CCNC: 32 U/L
ANION GAP SERPL CALC-SCNC: 14 MMOL/L
APPEARANCE: CLEAR
AST SERPL-CCNC: 25 U/L
BACTERIA: NEGATIVE
BASOPHILS # BLD AUTO: 0.04 K/UL
BASOPHILS NFR BLD AUTO: 0.5 %
BILIRUB SERPL-MCNC: 0.6 MG/DL
BILIRUBIN URINE: NEGATIVE
BLOOD URINE: NEGATIVE
BUN SERPL-MCNC: 20 MG/DL
CALCIUM SERPL-MCNC: 10 MG/DL
CHLORIDE SERPL-SCNC: 104 MMOL/L
CHOLEST SERPL-MCNC: 273 MG/DL
CO2 SERPL-SCNC: 24 MMOL/L
COLOR: NORMAL
CREAT SERPL-MCNC: 0.97 MG/DL
EOSINOPHIL # BLD AUTO: 0.09 K/UL
EOSINOPHIL NFR BLD AUTO: 1.1 %
ESTIMATED AVERAGE GLUCOSE: 126 MG/DL
GLUCOSE QUALITATIVE U: NEGATIVE
GLUCOSE SERPL-MCNC: 95 MG/DL
HBA1C MFR BLD HPLC: 6 %
HCT VFR BLD CALC: 49.7 %
HDLC SERPL-MCNC: 57 MG/DL
HGB BLD-MCNC: 15.5 G/DL
HYALINE CASTS: 0 /LPF
IMM GRANULOCYTES NFR BLD AUTO: 0.6 %
KETONES URINE: NEGATIVE
LDLC SERPL CALC-MCNC: 154 MG/DL
LEUKOCYTE ESTERASE URINE: NEGATIVE
LYMPHOCYTES # BLD AUTO: 1.22 K/UL
LYMPHOCYTES NFR BLD AUTO: 14.3 %
MAN DIFF?: NORMAL
MCHC RBC-ENTMCNC: 29 PG
MCHC RBC-ENTMCNC: 31.2 GM/DL
MCV RBC AUTO: 92.9 FL
MICROSCOPIC-UA: NORMAL
MONOCYTES # BLD AUTO: 0.75 K/UL
MONOCYTES NFR BLD AUTO: 8.8 %
NEUTROPHILS # BLD AUTO: 6.39 K/UL
NEUTROPHILS NFR BLD AUTO: 74.7 %
NITRITE URINE: NEGATIVE
NONHDLC SERPL-MCNC: 216 MG/DL
PH URINE: 5.5
PLATELET # BLD AUTO: 311 K/UL
POTASSIUM SERPL-SCNC: 5.1 MMOL/L
PROT SERPL-MCNC: 7.6 G/DL
PROTEIN URINE: NEGATIVE
RBC # BLD: 5.35 M/UL
RBC # FLD: 13.5 %
RED BLOOD CELLS URINE: 0 /HPF
SODIUM SERPL-SCNC: 143 MMOL/L
SPECIFIC GRAVITY URINE: 1.01
SQUAMOUS EPITHELIAL CELLS: 0 /HPF
TRIGL SERPL-MCNC: 307 MG/DL
TSH SERPL-ACNC: 2.62 UIU/ML
UROBILINOGEN URINE: NORMAL
VIT B12 SERPL-MCNC: 503 PG/ML
WBC # FLD AUTO: 8.54 K/UL
WHITE BLOOD CELLS URINE: 0 /HPF

## 2022-03-11 ENCOUNTER — TRANSCRIPTION ENCOUNTER (OUTPATIENT)
Age: 67
End: 2022-03-11

## 2022-03-16 ENCOUNTER — TRANSCRIPTION ENCOUNTER (OUTPATIENT)
Age: 67
End: 2022-03-16

## 2022-05-16 ENCOUNTER — APPOINTMENT (OUTPATIENT)
Dept: INTERNAL MEDICINE | Facility: CLINIC | Age: 67
End: 2022-05-16

## 2022-05-16 VITALS
DIASTOLIC BLOOD PRESSURE: 70 MMHG | WEIGHT: 211 LBS | TEMPERATURE: 97.9 F | SYSTOLIC BLOOD PRESSURE: 130 MMHG | BODY MASS INDEX: 31.25 KG/M2 | HEIGHT: 69 IN

## 2022-05-25 ENCOUNTER — APPOINTMENT (OUTPATIENT)
Dept: ORTHOPEDIC SURGERY | Facility: CLINIC | Age: 67
End: 2022-05-25
Payer: COMMERCIAL

## 2022-05-25 VITALS — HEIGHT: 69 IN | WEIGHT: 211 LBS | BODY MASS INDEX: 31.25 KG/M2

## 2022-05-25 DIAGNOSIS — S86.811A STRAIN OF OTHER MUSCLE(S) AND TENDON(S) AT LOWER LEG LEVEL, RIGHT LEG, INITIAL ENCOUNTER: ICD-10-CM

## 2022-05-25 LAB — PSA SERPL-MCNC: <0.01 NG/ML

## 2022-05-25 PROCEDURE — 99203 OFFICE O/P NEW LOW 30 MIN: CPT

## 2022-05-25 NOTE — HISTORY OF PRESENT ILLNESS
[4] : 4 [1] : 2 [Dull/Aching] : dull/aching [Radiating] : radiating [Constant] : constant [Rest] : rest [Ice] : ice [Walking] : walking [Stairs] : stairs [de-identified] : 05/25/2022: pt states he was trying to get something out of the back of his car and he stepped back and felt a sharp pain by his ankle going up his calf on 5/23/22. pain now improving. h/o prior achilles repair in 1986. no dm/tob. retired [] : no [FreeTextEntry1] : RT Achilles/ ankle [FreeTextEntry3] : 5/23/22 [FreeTextEntry6] : sore and tender [FreeTextEntry7] : up the calf

## 2022-05-25 NOTE — PHYSICAL EXAM
[Right] : right foot and ankle [NL (20)] : dorsiflexion 20 degrees [NL (40)] : plantar flexion 40 degrees [5___] : eversion 5[unfilled]/5 [2+] : dorsalis pedis pulse: 2+ [] : patient ambulates without assistive device [FreeTextEntry8] : medial gastroc ttp\par no achilles defect [de-identified] : normal nye test

## 2022-05-25 NOTE — ASSESSMENT
[FreeTextEntry1] : wbat\par HEP\par activity as dorothea\par nsaids prn\par f/up 1 month if not resolved

## 2022-06-08 ENCOUNTER — APPOINTMENT (OUTPATIENT)
Dept: UROLOGY | Facility: CLINIC | Age: 67
End: 2022-06-08
Payer: COMMERCIAL

## 2022-06-08 VITALS
DIASTOLIC BLOOD PRESSURE: 82 MMHG | BODY MASS INDEX: 30.51 KG/M2 | SYSTOLIC BLOOD PRESSURE: 151 MMHG | WEIGHT: 206 LBS | OXYGEN SATURATION: 96 % | HEIGHT: 69 IN | HEART RATE: 75 BPM

## 2022-06-08 DIAGNOSIS — C61 MALIGNANT NEOPLASM OF PROSTATE: ICD-10-CM

## 2022-06-08 PROCEDURE — 99213 OFFICE O/P EST LOW 20 MIN: CPT

## 2022-06-12 NOTE — HISTORY OF PRESENT ILLNESS
[None] : no symptoms [FreeTextEntry1] : 67 yo presents today for a follow-up appointment for prostate cancer. \par s/p RALP 6/2020\par Recent PSA <0.01 (5.16.22.)\par Pt. reports feeling well and has no acute urological issues at time of visit. \par

## 2022-06-12 NOTE — ASSESSMENT
[FreeTextEntry1] : PSA remains undetectable. \par Pt. is doing well overall. \par Repeat PSA in 6 months. \par Next follow-up appointment in 6 months. \par 20 minute discussion regarding prostate cancer followup, functional outcomes and review of labs\par

## 2022-06-13 ENCOUNTER — APPOINTMENT (OUTPATIENT)
Dept: INTERNAL MEDICINE | Facility: CLINIC | Age: 67
End: 2022-06-13
Payer: COMMERCIAL

## 2022-06-13 VITALS
TEMPERATURE: 98.5 F | SYSTOLIC BLOOD PRESSURE: 138 MMHG | OXYGEN SATURATION: 95 % | HEIGHT: 69 IN | WEIGHT: 207 LBS | DIASTOLIC BLOOD PRESSURE: 80 MMHG | BODY MASS INDEX: 30.66 KG/M2 | HEART RATE: 67 BPM

## 2022-06-13 VITALS — SYSTOLIC BLOOD PRESSURE: 122 MMHG | DIASTOLIC BLOOD PRESSURE: 80 MMHG

## 2022-06-13 PROCEDURE — 99214 OFFICE O/P EST MOD 30 MIN: CPT | Mod: 25

## 2022-06-13 PROCEDURE — 36415 COLL VENOUS BLD VENIPUNCTURE: CPT

## 2022-06-13 RX ORDER — FLUVASTATIN 20 MG/1
20 CAPSULE ORAL
Qty: 90 | Refills: 1 | Status: COMPLETED | COMMUNITY
Start: 2019-12-20 | End: 2022-06-13

## 2022-06-13 NOTE — PLAN
[FreeTextEntry1] : Patient will continue her current medications\par Fasting blood work done in office today\par Will further improve diet and exercise\par She will follow-up again with me in 4 months with fasting blood work

## 2022-06-13 NOTE — HISTORY OF PRESENT ILLNESS
[FreeTextEntry1] : 65 y/o male presents to the office today for a f/u visit with fasting labs.  [de-identified] : Patient presents the office today for follow-up with fasting blood work\par Patient has been improving diet and exercise\par She has been compliant with medications no complaints of side effects\par Seen Urologist last week PSA was good\par Derm routinely \par

## 2022-06-28 ENCOUNTER — TRANSCRIPTION ENCOUNTER (OUTPATIENT)
Age: 67
End: 2022-06-28

## 2022-06-28 LAB
25(OH)D3 SERPL-MCNC: 28.4 NG/ML
ALBUMIN SERPL ELPH-MCNC: 4.8 G/DL
ALP BLD-CCNC: 97 U/L
ALT SERPL-CCNC: 40 U/L
ANION GAP SERPL CALC-SCNC: 12 MMOL/L
AST SERPL-CCNC: 27 U/L
BASOPHILS # BLD AUTO: 0.03 K/UL
BASOPHILS NFR BLD AUTO: 0.5 %
BILIRUB SERPL-MCNC: 0.7 MG/DL
BUN SERPL-MCNC: 17 MG/DL
CALCIUM SERPL-MCNC: 9.8 MG/DL
CHLORIDE SERPL-SCNC: 108 MMOL/L
CHOLEST SERPL-MCNC: 201 MG/DL
CO2 SERPL-SCNC: 23 MMOL/L
CREAT SERPL-MCNC: 0.95 MG/DL
EGFR: 87 ML/MIN/1.73M2
EOSINOPHIL # BLD AUTO: 0.11 K/UL
EOSINOPHIL NFR BLD AUTO: 1.8 %
ESTIMATED AVERAGE GLUCOSE: 131 MG/DL
GLUCOSE SERPL-MCNC: 104 MG/DL
HBA1C MFR BLD HPLC: 6.2 %
HCT VFR BLD CALC: 44.2 %
HDLC SERPL-MCNC: 54 MG/DL
HGB BLD-MCNC: 14.3 G/DL
IMM GRANULOCYTES NFR BLD AUTO: 0.3 %
LDLC SERPL CALC-MCNC: 109 MG/DL
LYMPHOCYTES # BLD AUTO: 1.13 K/UL
LYMPHOCYTES NFR BLD AUTO: 18.4 %
MAN DIFF?: NORMAL
MCHC RBC-ENTMCNC: 30 PG
MCHC RBC-ENTMCNC: 32.4 GM/DL
MCV RBC AUTO: 92.7 FL
MONOCYTES # BLD AUTO: 0.53 K/UL
MONOCYTES NFR BLD AUTO: 8.6 %
NEUTROPHILS # BLD AUTO: 4.32 K/UL
NEUTROPHILS NFR BLD AUTO: 70.4 %
NONHDLC SERPL-MCNC: 147 MG/DL
PLATELET # BLD AUTO: 239 K/UL
POTASSIUM SERPL-SCNC: 4.8 MMOL/L
PROT SERPL-MCNC: 7 G/DL
RBC # BLD: 4.77 M/UL
RBC # FLD: 13.7 %
SODIUM SERPL-SCNC: 143 MMOL/L
TRIGL SERPL-MCNC: 190 MG/DL
VIT B12 SERPL-MCNC: 450 PG/ML
WBC # FLD AUTO: 6.14 K/UL

## 2022-07-18 ENCOUNTER — APPOINTMENT (OUTPATIENT)
Dept: ORTHOPEDIC SURGERY | Facility: CLINIC | Age: 67
End: 2022-07-18

## 2022-07-18 VITALS — WEIGHT: 205 LBS | BODY MASS INDEX: 29.35 KG/M2 | HEIGHT: 70 IN

## 2022-07-18 DIAGNOSIS — M75.41 IMPINGEMENT SYNDROME OF RIGHT SHOULDER: ICD-10-CM

## 2022-07-18 PROCEDURE — 99214 OFFICE O/P EST MOD 30 MIN: CPT | Mod: 25

## 2022-07-18 PROCEDURE — 73030 X-RAY EXAM OF SHOULDER: CPT | Mod: RT

## 2022-07-18 PROCEDURE — 20611 DRAIN/INJ JOINT/BURSA W/US: CPT | Mod: RT

## 2022-07-18 PROCEDURE — 73010 X-RAY EXAM OF SHOULDER BLADE: CPT | Mod: RT

## 2022-07-18 PROCEDURE — J3490M: CUSTOM

## 2022-07-18 NOTE — HISTORY OF PRESENT ILLNESS
[Right Arm] : right arm [Gradual] : gradual [8] : 8 [7] : 7 [Localized] : localized [Tightness] : tightness [Intermittent] : intermittent [Household chores] : household chores [Nothing helps with pain getting better] : Nothing helps with pain getting better [Lying in bed] : lying in bed [de-identified] : PT PRESENTS HERE TODAY WITH RIGHT SHOULDER SINCE ABOUT MARCH-APRIL 2022\par NO INJURY \par LIMITED ROM\par NO TREATMENT SO FAR , NO NECK PAIN [] : no [FreeTextEntry1] : SHOULDER [FreeTextEntry5] : NO INJURY [de-identified] : REACHING UP [de-identified] : NO INJURY

## 2022-07-18 NOTE — PROCEDURE
[FreeTextEntry3] : - Large joint injection was performed of the  RIGHT subacromial space. \par - The indication for this procedure was pain and inflammation. Patient has tried OTC's including aspirin, Ibuprofen, Aleve, etc or prescription NSAIDS, and/or exercises at home and/or physical therapy without satisfactory response, patient had decreased mobility in the joint and the risks benefits, and alternatives have been discussed, and verbal consent was obtained. The site was prepped with alcohol, betadine, ethyl chloride sprayed topically and sterile technique used. \par - An injection of Betamethasone 2cc; Lidocaine 3cc; Marcaine 3cc injected.\par - Patient was advised to call if redness, pain or fever occur, apply ice for 15 minutes out of every hour for the next 12-24 hours as tolerated and patient was advised to rest the joint(s) for 2 days. \par - Ultrasound guidance was indicated for this patient due to prior failure or difficult injection. All ultrasound images have been permanently captured and stored accordingly in our picture archiving and communication system. Visualization of the needle and placement of injection was performed without complication.\par

## 2022-07-18 NOTE — IMAGING
[de-identified] : No swelling, no ecchymosis, no kasey deformity\par Tenderness to palpation over greater tuberosity\par No instability or tenderness over AC joint\par Full range of motion with pain\par 5/5 supraspinatus, infraspinatus and subscapularis; there is pain with strength testing\par Positive Shepherd test, positive impingement sign\par Speed’s and yergason negative\par Motor and sensory intact distally\par  [Right] : right shoulder [There are no fractures, subluxations or dislocations. No significant abnormalities are seen] : There are no fractures, subluxations or dislocations. No significant abnormalities are seen

## 2022-07-18 NOTE — ASSESSMENT
[FreeTextEntry1] : ATRAUMATIC RIGHT SHOULDER PAIN SINCE MARCH\par MAY BE RELATED TO GOLFING AND LIFTING GRANDDAUGHTER BUT NO INJURY\par ROM AND STRENGTH WELL PRESEVRED TRIAL OF CSI AND PT ADVISED\par DISCUSSED MRI IFPERSISTS\par

## 2022-08-22 ENCOUNTER — OUTPATIENT (OUTPATIENT)
Dept: OUTPATIENT SERVICES | Facility: HOSPITAL | Age: 67
LOS: 1 days | End: 2022-08-22
Payer: COMMERCIAL

## 2022-08-22 ENCOUNTER — APPOINTMENT (OUTPATIENT)
Dept: RADIOLOGY | Facility: CLINIC | Age: 67
End: 2022-08-22

## 2022-08-22 DIAGNOSIS — Z98.890 OTHER SPECIFIED POSTPROCEDURAL STATES: Chronic | ICD-10-CM

## 2022-08-22 DIAGNOSIS — Z00.8 ENCOUNTER FOR OTHER GENERAL EXAMINATION: ICD-10-CM

## 2022-08-22 PROCEDURE — 71046 X-RAY EXAM CHEST 2 VIEWS: CPT

## 2022-08-22 PROCEDURE — 71046 X-RAY EXAM CHEST 2 VIEWS: CPT | Mod: 26

## 2022-08-22 PROCEDURE — 70220 X-RAY EXAM OF SINUSES: CPT | Mod: 26

## 2022-08-22 PROCEDURE — 70220 X-RAY EXAM OF SINUSES: CPT

## 2022-09-10 ENCOUNTER — APPOINTMENT (OUTPATIENT)
Dept: CT IMAGING | Facility: CLINIC | Age: 67
End: 2022-09-10

## 2022-09-10 ENCOUNTER — OUTPATIENT (OUTPATIENT)
Dept: OUTPATIENT SERVICES | Facility: HOSPITAL | Age: 67
LOS: 1 days | End: 2022-09-10
Payer: COMMERCIAL

## 2022-09-10 DIAGNOSIS — Z98.890 OTHER SPECIFIED POSTPROCEDURAL STATES: Chronic | ICD-10-CM

## 2022-09-10 DIAGNOSIS — Z00.8 ENCOUNTER FOR OTHER GENERAL EXAMINATION: ICD-10-CM

## 2022-09-10 PROCEDURE — 70486 CT MAXILLOFACIAL W/O DYE: CPT | Mod: 26

## 2022-09-10 PROCEDURE — 70486 CT MAXILLOFACIAL W/O DYE: CPT

## 2022-10-14 ENCOUNTER — APPOINTMENT (OUTPATIENT)
Dept: INTERNAL MEDICINE | Facility: CLINIC | Age: 67
End: 2022-10-14

## 2022-10-14 VITALS
WEIGHT: 205 LBS | BODY MASS INDEX: 30.36 KG/M2 | HEIGHT: 69 IN | HEART RATE: 65 BPM | OXYGEN SATURATION: 97 % | DIASTOLIC BLOOD PRESSURE: 80 MMHG | SYSTOLIC BLOOD PRESSURE: 120 MMHG | TEMPERATURE: 97.7 F

## 2022-10-14 PROCEDURE — 36415 COLL VENOUS BLD VENIPUNCTURE: CPT

## 2022-10-14 PROCEDURE — 99214 OFFICE O/P EST MOD 30 MIN: CPT | Mod: 25

## 2022-10-14 NOTE — HISTORY OF PRESENT ILLNESS
[FreeTextEntry1] : 67 y/o male presents to the office today for a f/u visit with fasting labs.  [de-identified] : Pt presents to the office today for follow up with fasting blood work\par Diet has been fair and pt has been more aware of his diet but states can still be better\par Been walking \par Had coloscopy \par Going to Urology 12/28/22

## 2022-10-23 ENCOUNTER — TRANSCRIPTION ENCOUNTER (OUTPATIENT)
Age: 67
End: 2022-10-23

## 2022-10-23 LAB
25(OH)D3 SERPL-MCNC: 28.1 NG/ML
ALBUMIN SERPL ELPH-MCNC: 5 G/DL
ALP BLD-CCNC: 98 U/L
ALT SERPL-CCNC: 30 U/L
ANION GAP SERPL CALC-SCNC: 12 MMOL/L
AST SERPL-CCNC: 25 U/L
BASOPHILS # BLD AUTO: 0.03 K/UL
BASOPHILS NFR BLD AUTO: 0.4 %
BILIRUB SERPL-MCNC: 0.8 MG/DL
BUN SERPL-MCNC: 21 MG/DL
CALCIUM SERPL-MCNC: 10.1 MG/DL
CHLORIDE SERPL-SCNC: 111 MMOL/L
CHOLEST SERPL-MCNC: 192 MG/DL
CO2 SERPL-SCNC: 24 MMOL/L
CREAT SERPL-MCNC: 0.92 MG/DL
EGFR: 91 ML/MIN/1.73M2
EOSINOPHIL # BLD AUTO: 0.25 K/UL
EOSINOPHIL NFR BLD AUTO: 3.1 %
ESTIMATED AVERAGE GLUCOSE: 128 MG/DL
GLUCOSE SERPL-MCNC: 113 MG/DL
HBA1C MFR BLD HPLC: 6.1 %
HCT VFR BLD CALC: 46.3 %
HDLC SERPL-MCNC: 52 MG/DL
HGB BLD-MCNC: 15.1 G/DL
IMM GRANULOCYTES NFR BLD AUTO: 0.4 %
LDLC SERPL CALC-MCNC: 105 MG/DL
LYMPHOCYTES # BLD AUTO: 1.32 K/UL
LYMPHOCYTES NFR BLD AUTO: 16.4 %
MAN DIFF?: NORMAL
MCHC RBC-ENTMCNC: 29.3 PG
MCHC RBC-ENTMCNC: 32.6 GM/DL
MCV RBC AUTO: 89.7 FL
MONOCYTES # BLD AUTO: 0.64 K/UL
MONOCYTES NFR BLD AUTO: 8 %
NEUTROPHILS # BLD AUTO: 5.78 K/UL
NEUTROPHILS NFR BLD AUTO: 71.7 %
NONHDLC SERPL-MCNC: 141 MG/DL
PLATELET # BLD AUTO: 268 K/UL
POTASSIUM SERPL-SCNC: 5.3 MMOL/L
PROT SERPL-MCNC: 7.5 G/DL
RBC # BLD: 5.16 M/UL
RBC # FLD: 13.4 %
SODIUM SERPL-SCNC: 146 MMOL/L
TRIGL SERPL-MCNC: 178 MG/DL
VIT B12 SERPL-MCNC: 658 PG/ML
WBC # FLD AUTO: 8.05 K/UL

## 2022-10-26 ENCOUNTER — TRANSCRIPTION ENCOUNTER (OUTPATIENT)
Age: 67
End: 2022-10-26

## 2022-12-26 ENCOUNTER — NON-APPOINTMENT (OUTPATIENT)
Age: 67
End: 2022-12-26

## 2022-12-28 ENCOUNTER — APPOINTMENT (OUTPATIENT)
Dept: OTOLARYNGOLOGY | Facility: CLINIC | Age: 67
End: 2022-12-28

## 2022-12-28 VITALS
SYSTOLIC BLOOD PRESSURE: 128 MMHG | DIASTOLIC BLOOD PRESSURE: 84 MMHG | BODY MASS INDEX: 30.36 KG/M2 | HEIGHT: 69 IN | HEART RATE: 82 BPM | WEIGHT: 205 LBS

## 2022-12-28 DIAGNOSIS — J34.3 HYPERTROPHY OF NASAL TURBINATES: ICD-10-CM

## 2022-12-28 DIAGNOSIS — J31.0 CHRONIC RHINITIS: ICD-10-CM

## 2022-12-28 DIAGNOSIS — J32.9 CHRONIC SINUSITIS, UNSPECIFIED: ICD-10-CM

## 2022-12-28 DIAGNOSIS — J34.2 DEVIATED NASAL SEPTUM: ICD-10-CM

## 2022-12-28 DIAGNOSIS — J33.9 NASAL POLYP, UNSPECIFIED: ICD-10-CM

## 2022-12-28 PROCEDURE — 31231 NASAL ENDOSCOPY DX: CPT

## 2022-12-28 PROCEDURE — 99204 OFFICE O/P NEW MOD 45 MIN: CPT | Mod: 25

## 2022-12-28 RX ORDER — OLOPATADINE HYDROCHLORIDE AND MOMETASONE FUROATE 25; 665 UG/1; UG/1
SPRAY, METERED NASAL
Refills: 0 | Status: ACTIVE | COMMUNITY

## 2022-12-28 RX ORDER — BUDESONIDE 1 MG/2ML
1 INHALANT ORAL
Qty: 60 | Refills: 3 | Status: ACTIVE | COMMUNITY
Start: 2022-12-28 | End: 1900-01-01

## 2022-12-28 NOTE — REVIEW OF SYSTEMS
[Seasonal Allergies] : seasonal allergies [Post Nasal Drip] : post nasal drip [Dizziness] : dizziness [Vertigo] : vertigo [Nasal Congestion] : nasal congestion [Recurrent Sinus Infections] : recurrent sinus infections [Problem Snoring] : problem snoring [Discolored Nasal Discharge] : discolored nasal discharge [Snoring With Pauses] : snoring with pauses [Hoarseness] : hoarseness [Throat Clearing] : throat clearing [Wheezing] : wheezing [Cough] : cough [Anxiety] : anxiety [Negative] : Heme/Lymph [de-identified] : panic [FreeTextEntry1] : fatigue,daytime sleepiness

## 2022-12-28 NOTE — ASSESSMENT
[FreeTextEntry1] : Reviewed and reconciled medications, allergies, PMHx, PSHx, SocHx, FMHx.\par \par h/o allergies and asthma presents today to check sinuses, discolored discharge. \par \par Physical Exam -\par deviated septum right, mildly inflamed turbs \par \par CT sinus 9/13/22 \par trace thickening in frontal extending out into drain pathway, thickening in ethmoid, thickening in maxillary\par \par Flexible nasal endoscopy \par left: bulbous inferior turb, large middle turb, no polyps, no growths, can't see into the middle meatus\par right: deviated septum, large middle turb, middle meatus looks clear, a couple polyps coming down from the middle meatus, inferior turb distorted\par \par Plan:\par Discussed CT Sinus. Discussed r/b/a of balloon dilatation versus continued use of medications (adding medications to sinus rinse). Flexible nasal endoscopy. Add Budesonide to sinus rinse and use 1 bottle per day. FU 6 weeks.

## 2022-12-28 NOTE — ADDENDUM
[FreeTextEntry1] : Documented by Fartun Deleon acting as scribe for Dr. Marrero on 12/28/2022.\par \par All Medical record entries made by the scribe were at my, Dr. Marrero,direction and personally dictated by me on 12/28/2022. I have reviewed the chart and agree that the record accurately reflects my personal performance of the history, physical exam, assessment and plan. I have also personally directed, reviewed, and agreed with the discharge instructions.

## 2022-12-28 NOTE — PHYSICAL EXAM
[] : septum deviated to the right [Midline] : trachea located in midline position [Normal] : orientation to person, place, and time: normal [de-identified] : mildly inflamed turbs  [de-identified] : residual tonsil tissue b/l [FreeTextEntry2] : sinuses nontender to percussion.  [de-identified] : YSABEL

## 2022-12-28 NOTE — PROCEDURE
[None] : none [Flexible Endoscope] : examined with the flexible endoscope [FreeTextEntry6] : Procedure: Flexible Nasal Endoscopy: Risks, benefits, and alternatives of flexible endoscopy were explained to the patient. The patient gave oral consent to proceed. The flexible scope was inserted into the right nasal cavity. Endoscopy of the inferior and middle meatus was performed. Olfactory cleft was clear. Spheno-ethmoid recess is clear. Nasopharynx was clear. deviated septum, large middle turb, middle meatus looks clear, a couple polyps coming down from the middle meatus, inferior turb distorted. The procedure was repeated on the contralateral side with bulbous inferior turb, large middle turb, no polyps, no growths, can't see into the middle meatus. \par  [de-identified] : chronic congestion, chronic discolored mucus

## 2022-12-28 NOTE — CONSULT LETTER
[Dear  ___] : Dear  [unfilled], [Consult Letter:] : I had the pleasure of evaluating your patient, [unfilled]. [Please see my note below.] : Please see my note below. [Consult Closing:] : Thank you very much for allowing me to participate in the care of this patient.  If you have any questions, please do not hesitate to contact me. [Sincerely,] : Sincerely, [FreeTextEntry3] : Max Marrero MD FACS

## 2022-12-28 NOTE — HISTORY OF PRESENT ILLNESS
[de-identified] : Pt presents today h/o asthma and allergies. Pt notes over the past 6 months his asthma and allergies have been difficult to deal with. Pt notes he went to an allergist and ENT. Pt notes he normally has allergy issues in Spring and Fall. Pt notes his sinuses are clogged and discolored. Pt notes he was put on Amoxicillin for a few days after a lung function test with pulmonologist in May and June. Pt notes he has similar problems in July and August and was prescribed Amoxicillin again. pt notes his cough and congestion persisted. Pt notes his pulmonologist then put him on Levaquin, which helped 90%. Pt notes he went recently again and his pulmonologist recently said his lung function test improved. Pt notes he went to an ENT for dizziness and he thinks he got CT or Xray of his sinuses. Pt notes he still has discolored mucus that he coughs up and blows from his nose. Pt notes he uses Albuterol and Symbicort for lungs and Ryaltris spray for the past month. Pt notes he uses sinus rinse and nebulizer every couple days. Pt notes Ryaltris helped a little bit. Pt notes he last got allergy tested as a kid and hasn't put much thought into allergy shots.

## 2023-01-04 ENCOUNTER — APPOINTMENT (OUTPATIENT)
Dept: UROLOGY | Facility: CLINIC | Age: 68
End: 2023-01-04
Payer: COMMERCIAL

## 2023-01-04 PROCEDURE — 99213 OFFICE O/P EST LOW 20 MIN: CPT

## 2023-01-05 LAB — PSA SERPL-MCNC: <0.01 NG/ML

## 2023-01-08 NOTE — HISTORY OF PRESENT ILLNESS
[None] : no symptoms [FreeTextEntry1] : 67 yo presents today for a follow-up appointment for prostate cancer. \par s/p RALP 6/2020\par Recent PSA <0.01 (12.27.22.)\par Pt. reports feeling well and has no acute urological issues at time of visit. \par

## 2023-01-08 NOTE — ASSESSMENT
[FreeTextEntry1] : PSA undetectable.\par Repeat PSA in 6 months.\par Next follow-up appointment in 6 months.

## 2023-04-26 ENCOUNTER — APPOINTMENT (OUTPATIENT)
Dept: INTERNAL MEDICINE | Facility: CLINIC | Age: 68
End: 2023-04-26
Payer: COMMERCIAL

## 2023-04-26 ENCOUNTER — NON-APPOINTMENT (OUTPATIENT)
Age: 68
End: 2023-04-26

## 2023-04-26 VITALS
SYSTOLIC BLOOD PRESSURE: 140 MMHG | HEART RATE: 67 BPM | OXYGEN SATURATION: 95 % | WEIGHT: 205 LBS | DIASTOLIC BLOOD PRESSURE: 88 MMHG | BODY MASS INDEX: 30.36 KG/M2 | TEMPERATURE: 97.7 F | HEIGHT: 69 IN

## 2023-04-26 VITALS — SYSTOLIC BLOOD PRESSURE: 122 MMHG | DIASTOLIC BLOOD PRESSURE: 82 MMHG

## 2023-04-26 DIAGNOSIS — M79.10 MYALGIA, UNSPECIFIED SITE: ICD-10-CM

## 2023-04-26 PROCEDURE — 99214 OFFICE O/P EST MOD 30 MIN: CPT | Mod: 25

## 2023-04-26 PROCEDURE — 93000 ELECTROCARDIOGRAM COMPLETE: CPT

## 2023-04-26 PROCEDURE — 36415 COLL VENOUS BLD VENIPUNCTURE: CPT

## 2023-04-26 NOTE — REVIEW OF SYSTEMS
[Postnasal Drip] : postnasal drip [Chest Pain] : chest pain [Palpitations] : no palpitations [Claudication] : no  leg claudication [Lower Ext Edema] : no lower extremity edema [Orthopena] : no orthopnea [Paroxysmal Nocturnal Dyspnea] : no paroxysmal nocturnal dyspnea [Muscle Pain] : muscle pain [Anxiety] : anxiety [Negative] : Heme/Lymph

## 2023-04-26 NOTE — PHYSICAL EXAM
[Normal] : no posterior cervical lymphadenopathy and no anterior cervical lymphadenopathy [de-identified] : Nasal mucosa erythema and swelling with turbinate hypertrophy

## 2023-04-26 NOTE — HISTORY OF PRESENT ILLNESS
[FreeTextEntry1] : 66 y/o male presents to the office today for a f/u visit with fasting labs  [de-identified] : Pt presents to the office today for follow up with fasting blood work\par He has been trying to improve diet and taking more walks lately \par Complaint with medications\par Vitamins has not been as compliant \par Pt has been getting intermittent chest tightness.  He has been under increased stress with his mother so has not been sure if that’s the cause\par No SOB no ALFREDO no jaw or neck pains

## 2023-04-26 NOTE — PLAN
[FreeTextEntry1] : Pt will start Coenzyme Q10 100mg  to see if this helps with muscle aches.  CPK level being done as well\par FBW done in office today \par Pt will make follow up appointment with cardio as he may need further eval with stress test to R/O atypical chest pain\par EKG NSR @ 64 BPM \par Follow up again with me in 6 months or before then if needed

## 2023-05-04 DIAGNOSIS — R07.89 OTHER CHEST PAIN: ICD-10-CM

## 2023-05-08 ENCOUNTER — TRANSCRIPTION ENCOUNTER (OUTPATIENT)
Age: 68
End: 2023-05-08

## 2023-05-08 LAB
25(OH)D3 SERPL-MCNC: 23.6 NG/ML
ALBUMIN SERPL ELPH-MCNC: 4.8 G/DL
ALP BLD-CCNC: 89 U/L
ALT SERPL-CCNC: 33 U/L
ANION GAP SERPL CALC-SCNC: 14 MMOL/L
AST SERPL-CCNC: 21 U/L
BASOPHILS # BLD AUTO: 0.02 K/UL
BASOPHILS NFR BLD AUTO: 0.2 %
BILIRUB SERPL-MCNC: 0.8 MG/DL
BUN SERPL-MCNC: 23 MG/DL
CALCIUM SERPL-MCNC: 9.7 MG/DL
CHLORIDE SERPL-SCNC: 106 MMOL/L
CHOLEST SERPL-MCNC: 204 MG/DL
CK SERPL-CCNC: 94 U/L
CO2 SERPL-SCNC: 21 MMOL/L
CREAT SERPL-MCNC: 0.85 MG/DL
EGFR: 94 ML/MIN/1.73M2
EOSINOPHIL # BLD AUTO: 0.13 K/UL
EOSINOPHIL NFR BLD AUTO: 1.3 %
ESTIMATED AVERAGE GLUCOSE: 137 MG/DL
GLUCOSE SERPL-MCNC: 104 MG/DL
HBA1C MFR BLD HPLC: 6.4 %
HCT VFR BLD CALC: 49 %
HDLC SERPL-MCNC: 56 MG/DL
HGB BLD-MCNC: 15.3 G/DL
IMM GRANULOCYTES NFR BLD AUTO: 0.4 %
LDLC SERPL CALC-MCNC: 87 MG/DL
LYMPHOCYTES # BLD AUTO: 1.31 K/UL
LYMPHOCYTES NFR BLD AUTO: 13.2 %
MAN DIFF?: NORMAL
MCHC RBC-ENTMCNC: 29 PG
MCHC RBC-ENTMCNC: 31.2 GM/DL
MCV RBC AUTO: 93 FL
MONOCYTES # BLD AUTO: 0.86 K/UL
MONOCYTES NFR BLD AUTO: 8.7 %
NEUTROPHILS # BLD AUTO: 7.58 K/UL
NEUTROPHILS NFR BLD AUTO: 76.2 %
NONHDLC SERPL-MCNC: 148 MG/DL
PLATELET # BLD AUTO: 292 K/UL
POTASSIUM SERPL-SCNC: 5.1 MMOL/L
PROT SERPL-MCNC: 7.1 G/DL
RBC # BLD: 5.27 M/UL
RBC # FLD: 13.6 %
SODIUM SERPL-SCNC: 141 MMOL/L
TRIGL SERPL-MCNC: 304 MG/DL
VIT B12 SERPL-MCNC: 429 PG/ML
WBC # FLD AUTO: 9.94 K/UL

## 2023-05-25 ENCOUNTER — APPOINTMENT (OUTPATIENT)
Dept: CARDIOLOGY | Facility: CLINIC | Age: 68
End: 2023-05-25
Payer: COMMERCIAL

## 2023-05-25 PROCEDURE — 93015 CV STRESS TEST SUPVJ I&R: CPT

## 2023-05-31 ENCOUNTER — TRANSCRIPTION ENCOUNTER (OUTPATIENT)
Age: 68
End: 2023-05-31

## 2023-06-14 ENCOUNTER — TRANSCRIPTION ENCOUNTER (OUTPATIENT)
Age: 68
End: 2023-06-14

## 2023-07-12 ENCOUNTER — NON-APPOINTMENT (OUTPATIENT)
Age: 68
End: 2023-07-12

## 2023-07-12 ENCOUNTER — APPOINTMENT (OUTPATIENT)
Dept: UROLOGY | Facility: CLINIC | Age: 68
End: 2023-07-12
Payer: COMMERCIAL

## 2023-07-12 PROCEDURE — 99213 OFFICE O/P EST LOW 20 MIN: CPT

## 2023-07-12 RX ORDER — SILDENAFIL 20 MG/1
20 TABLET ORAL
Qty: 30 | Refills: 3 | Status: ACTIVE | COMMUNITY
Start: 2023-07-12 | End: 1900-01-01

## 2023-07-15 NOTE — HISTORY OF PRESENT ILLNESS
[FreeTextEntry1] : 66 yo presents today for a follow-up appointment for prostate cancer. \par s/p RALP 6/2020\par Recent PSA <0.01 7/3/23\par \par Pt. reports feeling well and has no acute urological issues at time of visit. \par \par \par Patient is currently experiencing no symptoms.

## 2023-07-15 NOTE — PHYSICAL EXAM
[General Appearance - Well Developed] : well developed [General Appearance - Well Nourished] : well nourished [Normal Appearance] : normal appearance [Skin Color & Pigmentation] : normal skin color and pigmentation [Edema] : no peripheral edema [] : no respiratory distress [Exaggerated Use Of Accessory Muscles For Inspiration] : no accessory muscle use [Oriented To Time, Place, And Person] : oriented to person, place, and time [No Focal Deficits] : no focal deficits [Well Groomed] : well groomed [General Appearance - In No Acute Distress] : no acute distress [Abdomen Soft] : soft [Abdomen Tenderness] : non-tender [Costovertebral Angle Tenderness] : no ~M costovertebral angle tenderness [Urethral Meatus] : meatus normal [Urinary Bladder Findings] : the bladder was normal on palpation [Scrotum] : the scrotum was normal [Testes Mass (___cm)] : there were no testicular masses [Respiration, Rhythm And Depth] : normal respiratory rhythm and effort [Affect] : the affect was normal [Mood] : the mood was normal [Not Anxious] : not anxious [Normal Station and Gait] : the gait and station were normal for the patient's age [No Palpable Adenopathy] : no palpable adenopathy

## 2023-07-21 DIAGNOSIS — N52.9 MALE ERECTILE DYSFUNCTION, UNSPECIFIED: ICD-10-CM

## 2023-07-21 DIAGNOSIS — N52.39 OTHER AND UNSPECIFIED POSTPROCEDURAL ERECTILE DYSFUNCTION: ICD-10-CM

## 2023-07-21 RX ORDER — SILDENAFIL 50 MG/1
50 TABLET ORAL
Qty: 6 | Refills: 11 | Status: ACTIVE | COMMUNITY
Start: 2023-07-21 | End: 1900-01-01

## 2023-09-15 ENCOUNTER — TRANSCRIPTION ENCOUNTER (OUTPATIENT)
Age: 68
End: 2023-09-15

## 2023-09-29 NOTE — REASON FOR VISIT
Hospital Medicine History & Physical Note    Date of Service  9/28/2023    Primary Care Physician  Pcp Not In Computer    Consultants  none    Code Status  Full Code    Chief Complaint  Chief Complaint   Patient presents with    ETOH Withdrawal     Pt was previously sober for three years, but relapsed and heaving drinking since 9/16. Pt was sent in by nurse hotline after calling them for advice. Pt reports being very tremulous at home as well as having visual hallucinations and took a shot right before coming to the ER.       History of Presenting Illness  Maxime Fernandez is a 36 y.o. male prior alcoholic sober for 3 years reports he started drinking again .  Since 9/16 who presented 9/28/2023 with visual hallucinations, tremors and anxiety.  Reports he drinks 750 mL of whiskey since 16 September.  Currently denies any chest pain or abdominal pain.  He is not on any blood thinners.  Denies using any recreational drugs.  He denies any suicidal or homicidal ideation.      In the ER, he was noted to have low potassium, high anion gap given IV fluids and electrolytes were repleted.  He will be admitted for acute alcohol withdrawal  requiring inpatient treatment.    I discussed the plan of care with ER physician and patient.    Review of Systems  Review of Systems   Constitutional:  Negative for chills and fever.   HENT:  Negative for hearing loss and tinnitus.    Eyes:  Negative for blurred vision and double vision.   Respiratory:  Negative for cough and hemoptysis.    Cardiovascular:  Negative for chest pain and palpitations.   Gastrointestinal:  Positive for nausea. Negative for heartburn.   Genitourinary:  Negative for dysuria and urgency.   Musculoskeletal:  Negative for myalgias and neck pain.   Neurological:  Negative for dizziness and headaches.   Endo/Heme/Allergies:  Does not bruise/bleed easily.   Psychiatric/Behavioral:  Positive for hallucinations. Negative for depression and suicidal ideas. The  patient is nervous/anxious.        Past Medical History   has a past medical history of Anxiety and Psychiatric disorder.    Surgical History   has no past surgical history on file.     Family History  family history includes Hyperlipidemia in his father; No Known Problems in his brother and sister; Thyroid in his mother.   Family history reviewed with patient. There is no family history that is pertinent to the chief complaint.     Social History   reports that he has never smoked. He has never used smokeless tobacco. He reports that he does not currently use alcohol. He reports that he does not use drugs.    Allergies  No Known Allergies    Medications  Prior to Admission Medications   Prescriptions Last Dose Informant Patient Reported? Taking?   Esomeprazole Magnesium (NEXIUM PO)   Yes No   Sig: Take  by mouth.   Fluticasone Propionate (FLONASE NA)   Yes No   Sig: Administer  into affected nostril(S).   albuterol 108 (90 Base) MCG/ACT Aero Soln inhalation aerosol   No No   Sig: Inhale 2 Puffs every 6 hours as needed for Shortness of Breath.   Patient not taking: Reported on 1/2/2023   fexofenadine-pseudoephedrine (ALLEGRA-D)  MG per tablet   No No   Sig: Take 1 Tablet by mouth 2 times a day.   Patient not taking: Reported on 1/2/2023   promethazine-dextromethorphan (PROMETHAZINE-DM) 6.25-15 MG/5ML syrup   No No   Sig: Take 5 mL by mouth every 6 hours as needed for Cough.   Patient not taking: Reported on 1/2/2023      Facility-Administered Medications: None       Physical Exam  Temp:  [36.4 °C (97.5 °F)] 36.4 °C (97.5 °F)  Pulse:  [115-123] 119  Resp:  [18] 18  BP: (139-148)/() 148/84  SpO2:  [91 %-95 %] 94 %  Blood Pressure: (!) 148/84   Temperature: 36.4 °C (97.5 °F)   Pulse: (!) 119   Respiration: 18   Pulse Oximetry: 94 %       Physical Exam  Vitals and nursing note reviewed.   Constitutional:       Appearance: Normal appearance.   HENT:      Head: Normocephalic and atraumatic.      Right Ear:  "Tympanic membrane normal.      Left Ear: Tympanic membrane normal.      Nose: Nose normal.      Mouth/Throat:      Mouth: Mucous membranes are moist.      Pharynx: Oropharynx is clear.   Eyes:      Extraocular Movements: Extraocular movements intact.      Pupils: Pupils are equal, round, and reactive to light.   Cardiovascular:      Rate and Rhythm: Regular rhythm. Tachycardia present.      Pulses: Normal pulses.      Heart sounds: Normal heart sounds.   Pulmonary:      Effort: Pulmonary effort is normal.      Breath sounds: Normal breath sounds.   Abdominal:      General: Bowel sounds are normal. There is no distension.      Palpations: Abdomen is soft. There is no mass.   Musculoskeletal:         General: Normal range of motion.      Cervical back: Neck supple.      Comments: Bilateral tremors on outstretched hands   Skin:     General: Skin is warm.      Capillary Refill: Capillary refill takes less than 2 seconds.   Neurological:      General: No focal deficit present.      Mental Status: He is alert and oriented to person, place, and time. Mental status is at baseline.   Psychiatric:         Mood and Affect: Mood normal.         Behavior: Behavior normal.         Laboratory:  Recent Labs     09/28/23  1844   WBC 8.0   RBC 5.52   HEMOGLOBIN 17.1   HEMATOCRIT 45.8   MCV 83.0   MCH 31.0   MCHC 37.3*   RDW 37.4   PLATELETCT 168   MPV 10.6     Recent Labs     09/28/23  1844   SODIUM 133*   POTASSIUM 3.1*   CHLORIDE 91*   CO2 19*   GLUCOSE 127*   BUN 16   CREATININE 0.88   CALCIUM 8.1*     Recent Labs     09/28/23  1844   ALTSGPT 125*   ASTSGOT 164*   ALKPHOSPHAT 78   TBILIRUBIN 0.8   GLUCOSE 127*         No results for input(s): \"NTPROBNP\" in the last 72 hours.      No results for input(s): \"TROPONINT\" in the last 72 hours.    Imaging:  No orders to display       no X-Ray or EKG requiring interpretation    Assessment/Plan:  Justification for Admission Status  I anticipate this patient will require at least two " midnights for appropriate medical management, necessitating inpatient admission because alcohol withdrawal with perceptual disturbance requiring CIWA protocol, electrolyte replacement, telemetry monitoring IV fluids.    Patient will need a Telemetry bed on MEDICAL service .  The need is secondary to see above.    * Alcohol withdrawal with perceptual disturbances (HCC)- (present on admission)  Assessment & Plan  CIWA protocol  Continue with IV fluids  Supplement magnesium and potassium  Monitor for DTs  Seizure precautions  Encourage sobriety    High anion gap metabolic acidosis  Assessment & Plan  IV Fluids     Elevated LFTs- (present on admission)  Assessment & Plan  Check hep panel  Right upper quadrant sonogram   Avoid hepatotoxic agents    Hypomagnesemia- (present on admission)  Assessment & Plan  IV mag 2 gram supplemented    Hypokalemia- (present on admission)  Assessment & Plan  Replete       I independently reviewed pertinent clinical lab tests since admission and ordered additional follow up clinical lab tests.  I independently reviewed pertinent radiographic images and the radiologist's reports since admission and ordered additional follow up radiographic studies.  The details of the available patient records in [facility] (including laboratory tests, culture data, medications, imaging, and other pertinent diagnostic tests) and that information was utilized as warranted in today's medical decision making for this patient.  I personally evaluated the patient condition at bedside.     Care interventions include:   Review of interval medical and surgical history, current medications and outpatient medication reconciliation, interval imaging studies and radiologist interpretation, and interval laboratory values.     Aggregated care time spent evaluating, reassessing, reviewing documentation, providing care, and managing this patient exclusive of procedures: 75 minutes      VTE prophylaxis: SCDs/TEDs   [Acute] : an acute visit [Other: _____] : [unfilled] [FreeTextEntry1] : Initial visit

## 2023-10-26 ENCOUNTER — APPOINTMENT (OUTPATIENT)
Dept: INTERNAL MEDICINE | Facility: CLINIC | Age: 68
End: 2023-10-26
Payer: MEDICARE

## 2023-10-26 ENCOUNTER — NON-APPOINTMENT (OUTPATIENT)
Age: 68
End: 2023-10-26

## 2023-10-26 VITALS
OXYGEN SATURATION: 96 % | DIASTOLIC BLOOD PRESSURE: 92 MMHG | BODY MASS INDEX: 30.21 KG/M2 | SYSTOLIC BLOOD PRESSURE: 136 MMHG | TEMPERATURE: 97.8 F | HEIGHT: 69 IN | WEIGHT: 204 LBS | RESPIRATION RATE: 12 BRPM | HEART RATE: 64 BPM

## 2023-10-26 DIAGNOSIS — E78.00 PURE HYPERCHOLESTEROLEMIA, UNSPECIFIED: ICD-10-CM

## 2023-10-26 DIAGNOSIS — Z00.00 ENCOUNTER FOR GENERAL ADULT MEDICAL EXAMINATION W/OUT ABNORMAL FINDINGS: ICD-10-CM

## 2023-10-26 DIAGNOSIS — E55.9 VITAMIN D DEFICIENCY, UNSPECIFIED: ICD-10-CM

## 2023-10-26 DIAGNOSIS — R73.09 OTHER ABNORMAL GLUCOSE: ICD-10-CM

## 2023-10-26 DIAGNOSIS — E53.8 DEFICIENCY OF OTHER SPECIFIED B GROUP VITAMINS: ICD-10-CM

## 2023-10-26 PROCEDURE — 36415 COLL VENOUS BLD VENIPUNCTURE: CPT

## 2023-10-26 PROCEDURE — 93000 ELECTROCARDIOGRAM COMPLETE: CPT

## 2023-10-26 PROCEDURE — G0439: CPT

## 2023-11-05 ENCOUNTER — TRANSCRIPTION ENCOUNTER (OUTPATIENT)
Age: 68
End: 2023-11-05

## 2023-11-05 LAB
25(OH)D3 SERPL-MCNC: 29 NG/ML
ALBUMIN SERPL ELPH-MCNC: 4.8 G/DL
ALP BLD-CCNC: 92 U/L
ALT SERPL-CCNC: 28 U/L
ANION GAP SERPL CALC-SCNC: 14 MMOL/L
APPEARANCE: CLEAR
AST SERPL-CCNC: 23 U/L
BACTERIA: NEGATIVE /HPF
BASOPHILS # BLD AUTO: 0.03 K/UL
BASOPHILS NFR BLD AUTO: 0.4 %
BILIRUB SERPL-MCNC: 0.8 MG/DL
BILIRUBIN URINE: NEGATIVE
BLOOD URINE: NEGATIVE
BUN SERPL-MCNC: 20 MG/DL
CALCIUM SERPL-MCNC: 10 MG/DL
CAST: 0 /LPF
CHLORIDE SERPL-SCNC: 107 MMOL/L
CHOLEST SERPL-MCNC: 184 MG/DL
CO2 SERPL-SCNC: 22 MMOL/L
COLOR: YELLOW
CREAT SERPL-MCNC: 0.9 MG/DL
EGFR: 92 ML/MIN/1.73M2
EOSINOPHIL # BLD AUTO: 0.15 K/UL
EOSINOPHIL NFR BLD AUTO: 2 %
EPITHELIAL CELLS: 1 /HPF
ESTIMATED AVERAGE GLUCOSE: 123 MG/DL
GLUCOSE QUALITATIVE U: NEGATIVE MG/DL
GLUCOSE SERPL-MCNC: 107 MG/DL
HBA1C MFR BLD HPLC: 5.9 %
HCT VFR BLD CALC: 48.2 %
HDLC SERPL-MCNC: 56 MG/DL
HGB BLD-MCNC: 15.2 G/DL
IMM GRANULOCYTES NFR BLD AUTO: 0.4 %
KETONES URINE: NEGATIVE MG/DL
LDLC SERPL CALC-MCNC: 93 MG/DL
LEUKOCYTE ESTERASE URINE: NEGATIVE
LYMPHOCYTES # BLD AUTO: 1.24 K/UL
LYMPHOCYTES NFR BLD AUTO: 16.5 %
MAN DIFF?: NORMAL
MCHC RBC-ENTMCNC: 29.2 PG
MCHC RBC-ENTMCNC: 31.5 GM/DL
MCV RBC AUTO: 92.5 FL
MICROSCOPIC-UA: NORMAL
MONOCYTES # BLD AUTO: 0.61 K/UL
MONOCYTES NFR BLD AUTO: 8.1 %
NEUTROPHILS # BLD AUTO: 5.47 K/UL
NEUTROPHILS NFR BLD AUTO: 72.6 %
NITRITE URINE: NEGATIVE
NONHDLC SERPL-MCNC: 128 MG/DL
PH URINE: 5.5
PLATELET # BLD AUTO: 259 K/UL
POTASSIUM SERPL-SCNC: 5 MMOL/L
PROT SERPL-MCNC: 7.4 G/DL
PROTEIN URINE: NEGATIVE MG/DL
RBC # BLD: 5.21 M/UL
RBC # FLD: 14 %
RED BLOOD CELLS URINE: 0 /HPF
SODIUM SERPL-SCNC: 143 MMOL/L
SPECIFIC GRAVITY URINE: 1.02
TRIGL SERPL-MCNC: 206 MG/DL
TSH SERPL-ACNC: 2.01 UIU/ML
UROBILINOGEN URINE: 0.2 MG/DL
VIT B12 SERPL-MCNC: 627 PG/ML
WBC # FLD AUTO: 7.53 K/UL
WHITE BLOOD CELLS URINE: 0 /HPF

## 2023-11-06 ENCOUNTER — TRANSCRIPTION ENCOUNTER (OUTPATIENT)
Age: 68
End: 2023-11-06

## 2023-11-24 NOTE — ED ADULT NURSE NOTE - TEMPLATE LIST FOR HEAD TO TOE ASSESSMENT
21-year-old female, no past medical history presenting for 1 month of generalized nausea, vomiting, abdominal pain.  She also states that her LMP was early October and she missed her.  In November.  She took multiple pregnancy tests at home that were positive.  Denies fevers, chills, chest pain, shortness of breath, dysuria, hematuria, vaginal discharge, vaginal bleeding. Well appearing. Abdomen soft ND NT.
Neuro

## 2023-12-22 ENCOUNTER — EMERGENCY (EMERGENCY)
Facility: HOSPITAL | Age: 68
LOS: 1 days | Discharge: HOME | End: 2023-12-22
Attending: EMERGENCY MEDICINE | Admitting: EMERGENCY MEDICINE
Payer: MEDICARE

## 2023-12-22 VITALS
TEMPERATURE: 97 F | WEIGHT: 205.03 LBS | HEART RATE: 69 BPM | RESPIRATION RATE: 18 BRPM | SYSTOLIC BLOOD PRESSURE: 147 MMHG | DIASTOLIC BLOOD PRESSURE: 81 MMHG | OXYGEN SATURATION: 95 %

## 2023-12-22 VITALS
HEART RATE: 72 BPM | TEMPERATURE: 98 F | SYSTOLIC BLOOD PRESSURE: 138 MMHG | OXYGEN SATURATION: 96 % | RESPIRATION RATE: 18 BRPM | DIASTOLIC BLOOD PRESSURE: 78 MMHG

## 2023-12-22 DIAGNOSIS — Z98.890 OTHER SPECIFIED POSTPROCEDURAL STATES: Chronic | ICD-10-CM

## 2023-12-22 LAB
ALBUMIN SERPL ELPH-MCNC: 3.7 G/DL — SIGNIFICANT CHANGE UP (ref 3.3–5)
ALBUMIN SERPL ELPH-MCNC: 3.7 G/DL — SIGNIFICANT CHANGE UP (ref 3.3–5)
ALP SERPL-CCNC: 93 U/L — SIGNIFICANT CHANGE UP (ref 40–120)
ALP SERPL-CCNC: 93 U/L — SIGNIFICANT CHANGE UP (ref 40–120)
ALT FLD-CCNC: 47 U/L — SIGNIFICANT CHANGE UP (ref 12–78)
ALT FLD-CCNC: 47 U/L — SIGNIFICANT CHANGE UP (ref 12–78)
ANION GAP SERPL CALC-SCNC: 5 MMOL/L — SIGNIFICANT CHANGE UP (ref 5–17)
ANION GAP SERPL CALC-SCNC: 5 MMOL/L — SIGNIFICANT CHANGE UP (ref 5–17)
APTT BLD: 28 SEC — SIGNIFICANT CHANGE UP (ref 24.5–35.6)
APTT BLD: 28 SEC — SIGNIFICANT CHANGE UP (ref 24.5–35.6)
AST SERPL-CCNC: 35 U/L — SIGNIFICANT CHANGE UP (ref 15–37)
AST SERPL-CCNC: 35 U/L — SIGNIFICANT CHANGE UP (ref 15–37)
BASOPHILS # BLD AUTO: 0.04 K/UL — SIGNIFICANT CHANGE UP (ref 0–0.2)
BASOPHILS # BLD AUTO: 0.04 K/UL — SIGNIFICANT CHANGE UP (ref 0–0.2)
BASOPHILS NFR BLD AUTO: 0.6 % — SIGNIFICANT CHANGE UP (ref 0–2)
BASOPHILS NFR BLD AUTO: 0.6 % — SIGNIFICANT CHANGE UP (ref 0–2)
BILIRUB SERPL-MCNC: 0.6 MG/DL — SIGNIFICANT CHANGE UP (ref 0.2–1.2)
BILIRUB SERPL-MCNC: 0.6 MG/DL — SIGNIFICANT CHANGE UP (ref 0.2–1.2)
BUN SERPL-MCNC: 22 MG/DL — SIGNIFICANT CHANGE UP (ref 7–23)
BUN SERPL-MCNC: 22 MG/DL — SIGNIFICANT CHANGE UP (ref 7–23)
CALCIUM SERPL-MCNC: 9 MG/DL — SIGNIFICANT CHANGE UP (ref 8.5–10.1)
CALCIUM SERPL-MCNC: 9 MG/DL — SIGNIFICANT CHANGE UP (ref 8.5–10.1)
CHLORIDE SERPL-SCNC: 109 MMOL/L — HIGH (ref 96–108)
CHLORIDE SERPL-SCNC: 109 MMOL/L — HIGH (ref 96–108)
CK MB CFR SERPL CALC: 4.1 NG/ML — HIGH (ref 0–3.6)
CK MB CFR SERPL CALC: 4.1 NG/ML — HIGH (ref 0–3.6)
CO2 SERPL-SCNC: 27 MMOL/L — SIGNIFICANT CHANGE UP (ref 22–31)
CO2 SERPL-SCNC: 27 MMOL/L — SIGNIFICANT CHANGE UP (ref 22–31)
CREAT SERPL-MCNC: 1.1 MG/DL — SIGNIFICANT CHANGE UP (ref 0.5–1.3)
CREAT SERPL-MCNC: 1.1 MG/DL — SIGNIFICANT CHANGE UP (ref 0.5–1.3)
D DIMER BLD IA.RAPID-MCNC: <150 NG/ML DDU — SIGNIFICANT CHANGE UP
D DIMER BLD IA.RAPID-MCNC: <150 NG/ML DDU — SIGNIFICANT CHANGE UP
EGFR: 73 ML/MIN/1.73M2 — SIGNIFICANT CHANGE UP
EGFR: 73 ML/MIN/1.73M2 — SIGNIFICANT CHANGE UP
EOSINOPHIL # BLD AUTO: 0.13 K/UL — SIGNIFICANT CHANGE UP (ref 0–0.5)
EOSINOPHIL # BLD AUTO: 0.13 K/UL — SIGNIFICANT CHANGE UP (ref 0–0.5)
EOSINOPHIL NFR BLD AUTO: 2 % — SIGNIFICANT CHANGE UP (ref 0–6)
EOSINOPHIL NFR BLD AUTO: 2 % — SIGNIFICANT CHANGE UP (ref 0–6)
GLUCOSE SERPL-MCNC: 135 MG/DL — HIGH (ref 70–99)
GLUCOSE SERPL-MCNC: 135 MG/DL — HIGH (ref 70–99)
HCT VFR BLD CALC: 43.2 % — SIGNIFICANT CHANGE UP (ref 39–50)
HCT VFR BLD CALC: 43.2 % — SIGNIFICANT CHANGE UP (ref 39–50)
HGB BLD-MCNC: 14.5 G/DL — SIGNIFICANT CHANGE UP (ref 13–17)
HGB BLD-MCNC: 14.5 G/DL — SIGNIFICANT CHANGE UP (ref 13–17)
IMM GRANULOCYTES NFR BLD AUTO: 0.3 % — SIGNIFICANT CHANGE UP (ref 0–0.9)
IMM GRANULOCYTES NFR BLD AUTO: 0.3 % — SIGNIFICANT CHANGE UP (ref 0–0.9)
INR BLD: 0.92 RATIO — SIGNIFICANT CHANGE UP (ref 0.85–1.18)
INR BLD: 0.92 RATIO — SIGNIFICANT CHANGE UP (ref 0.85–1.18)
LYMPHOCYTES # BLD AUTO: 1.46 K/UL — SIGNIFICANT CHANGE UP (ref 1–3.3)
LYMPHOCYTES # BLD AUTO: 1.46 K/UL — SIGNIFICANT CHANGE UP (ref 1–3.3)
LYMPHOCYTES # BLD AUTO: 22.4 % — SIGNIFICANT CHANGE UP (ref 13–44)
LYMPHOCYTES # BLD AUTO: 22.4 % — SIGNIFICANT CHANGE UP (ref 13–44)
MAGNESIUM SERPL-MCNC: 2.2 MG/DL — SIGNIFICANT CHANGE UP (ref 1.6–2.6)
MAGNESIUM SERPL-MCNC: 2.2 MG/DL — SIGNIFICANT CHANGE UP (ref 1.6–2.6)
MCHC RBC-ENTMCNC: 29.2 PG — SIGNIFICANT CHANGE UP (ref 27–34)
MCHC RBC-ENTMCNC: 29.2 PG — SIGNIFICANT CHANGE UP (ref 27–34)
MCHC RBC-ENTMCNC: 33.6 GM/DL — SIGNIFICANT CHANGE UP (ref 32–36)
MCHC RBC-ENTMCNC: 33.6 GM/DL — SIGNIFICANT CHANGE UP (ref 32–36)
MCV RBC AUTO: 87.1 FL — SIGNIFICANT CHANGE UP (ref 80–100)
MCV RBC AUTO: 87.1 FL — SIGNIFICANT CHANGE UP (ref 80–100)
MONOCYTES # BLD AUTO: 0.76 K/UL — SIGNIFICANT CHANGE UP (ref 0–0.9)
MONOCYTES # BLD AUTO: 0.76 K/UL — SIGNIFICANT CHANGE UP (ref 0–0.9)
MONOCYTES NFR BLD AUTO: 11.7 % — SIGNIFICANT CHANGE UP (ref 2–14)
MONOCYTES NFR BLD AUTO: 11.7 % — SIGNIFICANT CHANGE UP (ref 2–14)
NEUTROPHILS # BLD AUTO: 4.11 K/UL — SIGNIFICANT CHANGE UP (ref 1.8–7.4)
NEUTROPHILS # BLD AUTO: 4.11 K/UL — SIGNIFICANT CHANGE UP (ref 1.8–7.4)
NEUTROPHILS NFR BLD AUTO: 63 % — SIGNIFICANT CHANGE UP (ref 43–77)
NEUTROPHILS NFR BLD AUTO: 63 % — SIGNIFICANT CHANGE UP (ref 43–77)
NRBC # BLD: 0 /100 WBCS — SIGNIFICANT CHANGE UP (ref 0–0)
NRBC # BLD: 0 /100 WBCS — SIGNIFICANT CHANGE UP (ref 0–0)
PLATELET # BLD AUTO: 264 K/UL — SIGNIFICANT CHANGE UP (ref 150–400)
PLATELET # BLD AUTO: 264 K/UL — SIGNIFICANT CHANGE UP (ref 150–400)
POTASSIUM SERPL-MCNC: 3.8 MMOL/L — SIGNIFICANT CHANGE UP (ref 3.5–5.3)
POTASSIUM SERPL-MCNC: 3.8 MMOL/L — SIGNIFICANT CHANGE UP (ref 3.5–5.3)
POTASSIUM SERPL-SCNC: 3.8 MMOL/L — SIGNIFICANT CHANGE UP (ref 3.5–5.3)
POTASSIUM SERPL-SCNC: 3.8 MMOL/L — SIGNIFICANT CHANGE UP (ref 3.5–5.3)
PROT SERPL-MCNC: 7.2 G/DL — SIGNIFICANT CHANGE UP (ref 6–8.3)
PROT SERPL-MCNC: 7.2 G/DL — SIGNIFICANT CHANGE UP (ref 6–8.3)
PROTHROM AB SERPL-ACNC: 10.8 SEC — SIGNIFICANT CHANGE UP (ref 9.5–13)
PROTHROM AB SERPL-ACNC: 10.8 SEC — SIGNIFICANT CHANGE UP (ref 9.5–13)
RBC # BLD: 4.96 M/UL — SIGNIFICANT CHANGE UP (ref 4.2–5.8)
RBC # BLD: 4.96 M/UL — SIGNIFICANT CHANGE UP (ref 4.2–5.8)
RBC # FLD: 13.2 % — SIGNIFICANT CHANGE UP (ref 10.3–14.5)
RBC # FLD: 13.2 % — SIGNIFICANT CHANGE UP (ref 10.3–14.5)
SODIUM SERPL-SCNC: 141 MMOL/L — SIGNIFICANT CHANGE UP (ref 135–145)
SODIUM SERPL-SCNC: 141 MMOL/L — SIGNIFICANT CHANGE UP (ref 135–145)
TROPONIN I, HIGH SENSITIVITY RESULT: 59.5 NG/L — SIGNIFICANT CHANGE UP
TROPONIN I, HIGH SENSITIVITY RESULT: 59.5 NG/L — SIGNIFICANT CHANGE UP
WBC # BLD: 6.52 K/UL — SIGNIFICANT CHANGE UP (ref 3.8–10.5)
WBC # BLD: 6.52 K/UL — SIGNIFICANT CHANGE UP (ref 3.8–10.5)
WBC # FLD AUTO: 6.52 K/UL — SIGNIFICANT CHANGE UP (ref 3.8–10.5)
WBC # FLD AUTO: 6.52 K/UL — SIGNIFICANT CHANGE UP (ref 3.8–10.5)

## 2023-12-22 PROCEDURE — 85025 COMPLETE CBC W/AUTO DIFF WBC: CPT

## 2023-12-22 PROCEDURE — 71045 X-RAY EXAM CHEST 1 VIEW: CPT | Mod: 26

## 2023-12-22 PROCEDURE — 99285 EMERGENCY DEPT VISIT HI MDM: CPT | Mod: 25

## 2023-12-22 PROCEDURE — 85610 PROTHROMBIN TIME: CPT

## 2023-12-22 PROCEDURE — 85379 FIBRIN DEGRADATION QUANT: CPT

## 2023-12-22 PROCEDURE — 36415 COLL VENOUS BLD VENIPUNCTURE: CPT

## 2023-12-22 PROCEDURE — 99285 EMERGENCY DEPT VISIT HI MDM: CPT

## 2023-12-22 PROCEDURE — 71045 X-RAY EXAM CHEST 1 VIEW: CPT

## 2023-12-22 PROCEDURE — 93005 ELECTROCARDIOGRAM TRACING: CPT

## 2023-12-22 PROCEDURE — 84484 ASSAY OF TROPONIN QUANT: CPT

## 2023-12-22 PROCEDURE — 80053 COMPREHEN METABOLIC PANEL: CPT

## 2023-12-22 PROCEDURE — 93010 ELECTROCARDIOGRAM REPORT: CPT

## 2023-12-22 PROCEDURE — 83735 ASSAY OF MAGNESIUM: CPT

## 2023-12-22 PROCEDURE — 82553 CREATINE MB FRACTION: CPT

## 2023-12-22 PROCEDURE — 85730 THROMBOPLASTIN TIME PARTIAL: CPT

## 2023-12-22 NOTE — ED PROVIDER NOTE - CCCP TRG CHIEF CMPLNT
Certificate of Child Health Examination     Student’s Name    Woodrow Coles  Last                     First                         Middle  Birth Date  (Mo/Day/Yr)    11/26/2009 Sex  Male   Race/Ethnicity  White  NON  OR  OR  ETHNICITY School/Grade Level/ID#   12th Grade   [address] [place] [address]  Street Address                                 City                                Zip Code   Parent/Guardian                                                                   Telephone (home/work)   HEALTH HISTORY: MUST BE COMPLETED AND SIGNED BY PARENT/GUARDIAN AND VERIFIED BY HEALTH CARE PROVIDER     ALLERGIES (Food, drug, insect, other):   Penicillins  MEDICATION (List all prescribed or taken on a regular basis)      Diagnosis of asthma?  Child wakes during the night coughing? [] Yes    [] No  [] Yes    [] No  Loss of function of one of paired organs? (eye/ear/kidney/testicle) [] Yes    [] No    Birth defects? [] Yes    [] No  Hospitalizations?  When?  What for? [] Yes    [] No    Developmental delay? [] Yes    [] No       Blood disorders?  Hemophilia,  Sickle Cell, Other?  Explain [] Yes    [] No  Surgery? (List all.)  When?  What for? [] Yes    [] No    Diabetes? [] Yes    [] No  Serious injury or illness? [] Yes    [] No    Head injury/Concussion/Passed out? [] Yes    [] No  TB skin test positive (past/present)? [] Yes    [] No *If yes, refer to local health department   Seizures?  What are they like? [] Yes    [] No  TB disease (past or present)? [] Yes    [] No    Heart problem/Shortness of breath? [] Yes    [] No  Tobacco use (type, frequency)? [] Yes    [] No    Heart murmur/High blood pressure? [] Yes    [] No  Alcohol/Drug use? [] Yes    [] No    Dizziness or chest pain with exercise? [] Yes    [] No  Family history of sudden death  before age 50? (Cause?) [] Yes    [] No    Eye/Vision problems? [] Yes [] No  Glasses [] Contacts[] Last exam by eye  chest discomfort doctor________ Dental    [] Braces    [] Bridge    [] Plate  []  Other:    Other concerns? (crossed eye, drooping lids, squinting, difficulty reading) Additional Information:   Ear/Hearing problems? Yes[]No[]  Information may be shared with appropriate personnel for health and education purposes.  Patent/Guardian  Signature:                                                                 Date:   Bone/Joint problem/injury/scoliosis? Yes[]No[]     IMMUNIZATIONS: To be completed by health care provider. The mo/day/yr for every dose administered is required. If a specific vaccine is medically contraindicated, a separate written statement must be attached by the health care provider responsible for completing the health examination explaining the medical reason for the contraindication.   REQUIRED  VACCINE / DOSE DATE DATE DATE DATE DATE   Diphtheria, Tetanus and Pertussis (DTP or DTap) 2/7/2008 3/27/2008 7/2/2008 1/13/2010 9/24/2013   Tdap 7/2/2019       Td        Pediatric DT        Inactivate Polio (IPV) 2/7/2008 3/27/2008 7/2/2008 9/24/2013    Oral Polio (OPV)        Haemophilus Influenza Type B (Hib) 2/7/2008 3/27/2008 7/2/2008 1/13/2010    Hepatitis B (HB) 11/26/2009 2/7/2008 3/27/2008 7/2/2008    Varicella (Chickenpox) 7/15/2009 9/24/2013      Combined Measles, Mumps and Rubella (MMR) 7/15/2009 9/24/2013      Measles (Rubeola)        Rubella (3-day measles)        Mumps        Pneumococcal 2/7/2008 3/27/2008 7/2/2008 7/15/2009    Meningococcal Conjugate 7/2/2019 6/3/2025        RECOMMENDED, BUT NOT REQUIRED  VACCINE / DOSE DATE DATE DATE DATE DATE DATE   Hepatitis A 7/23/2015 7/2/2019       HPV 8/11/2021 8/17/2022       Influenza 10/2/2008 11/28/2008 12/17/2009 12/1/2010 10/23/2019 10/22/2021   Men B         Covid 5/14/2021 6/4/2021 1/25/2022         Health care provider (MD, DO, APN, PA, school health professional, health official) verifying above immunization history must sign below.  If adding dates to the  above immunization history section, put your initials by date(s) and sign here.      Signature                                                                                                                                                                                Title______________________________________ Date 6/3/2025       Zia Troy  Birth Date 11/26/2009 Sex Male School Grade Level/ID# 12th Grade       Certificates of Adventism Exemption to Immunizations or Physician Medical Statements of Medical Contraindication  are reviewed and Maintained by the School Authority.   ALTERNATIVE PROOF OF IMMUNITY   1. Clinical diagnosis (measles, mumps, hepatitis B) is allowed when verified by physician and supported with lab confirmation.  Attach copy of lab result.  *MEASLES (Rubeola) (MO/DA/YR) ____________  **MUMPS (MO/DA/YR) ____________   HEPATITIS B (MO/DA/YR) ____________   VARICELLA (MO/DA/YR) ____________   2. History of varicella (chickenpox) disease is acceptable if verified by health care provider, school health professional or health official.    Person signing below verifies that the parent/guardian’s description of varicella disease history is indicative of past infection and is accepting such history as documentation of disease.     Date of Disease:   Signature:   Title:                          3. Laboratory Evidence of Immunity (check one) [] Measles     [] Mumps      [] Rubella      [] Hepatitis B      [] Varicella      Attach copy of lab result.   * All measles cases diagnosed on or after July 1, 2002, must be confirmed by laboratory evidence.  ** All mumps cases diagnosed on or after July 1, 2013, must be confirmed by laboratory evidence.  Physician Statements of Immunity MUST be submitted to ID for review.  Completion of Alternatives 1 or 3 MUST be accompanied by Labs & Physician Signature: __________________________________________________________________     PHYSICAL EXAMINATION  REQUIREMENTS     Entire section below to be completed by MD//MEGAN/PA   Ht 6' 1\"   Wt 98 kg (216 lb)   BMI 28.50 kg/m²  95 %ile (Z= 1.63) based on CDC (Boys, 2-20 Years) BMI-for-age based on BMI available on 6/3/2025.   DIABETES SCREENING: (NOT REQUIRED FOR DAY CARE)  BMI>85% age/sex No  And any two of the following: Family History No  Ethnic Minority No Signs of Insulin Resistance (hypertension, dyslipidemia, polycystic ovarian syndrome, acanthosis nigricans) No At Risk No      LEAD RISK QUESTIONNAIRE: Required for children aged 6 months through 6 years enrolled in licensed or public-school operated day care, , nursery school and/or . (Blood test required if resides in [place] or high-risk zip code.)  Questionnaire Administered?  Yes               Blood Test Indicated?  No                Blood Test Date: _________________    Result: _____________________   TB SKIN OR BLOOD TEST: Recommended only for children in high-risk groups including children immunosuppressed due to HIV infection or other conditions, frequent travel to or born in high prevalence countries or those exposed to adults in high-risk categories. See CDC guidelines. http://www.cdc.gov/tb/publications/factsheets/testing/TB_testing.htm  No Test Needed   Skin test:   Date Read ___________________  Result            mm ___________                                                      Blood Test:   Date Reported: ____________________ Result:            Value ______________     LAB TESTS (Recommended) Date Results Screenings Date Results   Hemoglobin or Hematocrit   Developmental Screening  [] Completed  [] N/A   Urinalysis   Social and Emotional Screening  [] Completed  [] N/A   Sickle Cell (when indicated)   Other:       SYSTEM REVIEW Normal Comments/Follow-up/Needs SYSTEM REVIEW Normal Comments/Follow-up/Needs   Skin Yes  Endocrine Yes    Ears Yes                                           Screening Result: Gastrointestinal Yes     Eyes Yes                                           Screening Result: Genito-Urinary Yes                                                      LMP: No LMP for male patient.   Nose Yes  Neurological Yes    Throat Yes  Musculoskeletal Yes    Mouth/Dental Yes  Spinal Exam Yes    Cardiovascular/HTN Yes  Nutritional Status Yes    Respiratory Yes  Mental Health Yes    Currently Prescribed Asthma Medication:           Quick-relief  medication (e.g. Short Acting Beta Antagonist): No          Controller medication (e.g. inhaled corticosteroid):   No Other     NEEDS/MODIFICATIONS: required in the school setting: None   DIETARY Needs/Restrictions: None   SPECIAL INSTRUCTIONS/DEVICES e.g., safety glasses, glass eye, chest protector for arrhythmia, pacemaker, prosthetic device, dental bridge, false teeth, athletic support/cup)  None   MENTAL HEALTH/OTHER Is there anything else the school should know about this student? No  If you would like to discuss this student's health with school or school health personnel, check title: [] Nurse  [] Teacher  [] Counselor  [] Principal   EMERGENCY ACTION PLAN: needed while at school due to child's health condition (e.g., seizures, asthma, insect sting, food, peanut allergy, bleeding problem, diabetes, heart problem?  No  If yes, please describe:   On the basis of the examination on this day, I approve this child's participation in                                        (If No or Modified please attach explanation.)  PHYSICAL EDUCATION   Yes                    INTERSCHOLASTIC SPORTS  Yes     Print Name: Salina Coronado MD                                                                                              Signature:                                                                              Date: 6/3/2025    Address: [address] [place] [address], [id number]                                                                                                                                               Phone: [phone]

## 2023-12-22 NOTE — ED ADULT NURSE NOTE - NSICDXFAMILYHX_GEN_ALL_CORE_FT
Health Maintenance Due   Topic Date Due   • Pneumococcal Vaccine 0-64 (2 of 3 - PCV13) 01/29/2010   • Diabetes Eye Exam  10/03/2017   • DM/CKD Microalbumin  05/09/2018   • Shingles Vaccine (2 of 2) 04/17/2019       Patient is due for topics as listed above but is not proceeding with Immunization(s) Pneumococcal and Shingles and Diabetes Eye Exam at this time.            FAMILY HISTORY:  FH: congestive heart failure, father  FH: prostate cancer, brother  FH: testicular cancer, father

## 2023-12-22 NOTE — ED PROVIDER NOTE - CARE PROVIDERS DIRECT ADDRESSES
,[email or web address]

## 2023-12-22 NOTE — ED ADULT NURSE NOTE - NSFALLUNIVINTERV_ED_ALL_ED
Bed/Stretcher in lowest position, wheels locked, appropriate side rails in place/Call bell, personal items and telephone in reach/Instruct patient to call for assistance before getting out of bed/chair/stretcher/Non-slip footwear applied when patient is off stretcher/[place] to call system/Physically safe environment - no spills, clutter or unnecessary equipment/Purposeful proactive rounding/Room/bathroom lighting operational, light cord in reach Bed/Stretcher in lowest position, wheels locked, appropriate side rails in place/Call bell, personal items and telephone in reach/Instruct patient to call for assistance before getting out of bed/chair/stretcher/Non-slip footwear applied when patient is off stretcher/[place] to call system/Physically safe environment - no spills, clutter or unnecessary equipment/Purposeful proactive rounding/Room/bathroom lighting operational, light cord in reach

## 2023-12-22 NOTE — ED PROVIDER NOTE - PATIENT PORTAL LINK FT
You can access the FollowMyHealth Patient Portal offered by [facility] by registering at the following website: [email or web address]. By joining [employer]’s FollowMyHealth portal, you will also be able to view your health information using other applications (apps) compatible with our system. You can access the FollowMyHealth Patient Portal offered by [facility] by registering at the following website: [email or web address]. By joining [employer]s FollowMyHealth portal, you will also be able to view your health information using other applications (apps) compatible with our system.

## 2023-12-22 NOTE — ED PROVIDER NOTE - CLINICAL SUMMARY MEDICAL DECISION MAKING FREE TEXT BOX
67-year-old male complaining of chest tightness, patient has no prior cardiac disease, had stress test within the last year which was normal, follow-up EKG, CBC, CMP, cardiac enzymes, D-dimer, chest x-ray, and reevaluate.

## 2023-12-22 NOTE — ED PROVIDER NOTE - PROGRESS NOTE DETAILS
patient feeling well, no chest pain, labs, ekg, chest xray, no acute findings, given return precautions, will f/u with his PCP

## 2023-12-22 NOTE — ED PROVIDER NOTE - CARE PROVIDER_API CALL
Phu Tolliver  Physician Assistant Services  [address] [place] [id number]  Phone: [phone]  Fax: [phone]  Follow Up Time:    Phu Tolliver  Physician Assistant Services  [address] [place] [id number]  Phone: [phone]  Fax: [phone]  Follow Up Time:

## 2023-12-22 NOTE — ED PROVIDER NOTE - NSFOLLOWUPINSTRUCTIONS_ED_ALL_ED_FT
Chest pain is an uncomfortable, tight, or painful feeling in the chest. The pain can feel like a crushing, aching, or squeezing pressure. A person can feel a burning or tingling sensation. Chest pain can also be felt in your back, neck, jaw, shoulder, or arm. This pain can be worse when you move, sneeze, or take a deep breath.    Chest pain can be caused by a condition that is life-threatening. This must be treated right away. It can also be caused by something that is not life-threatening. If you have chest pain, it can be hard to know the difference, so it is important to get help right away to make sure that you do not have a serious condition.    Some life-threatening causes of chest pain include:  Heart attack.  A tear in the body's main blood vessel (aortic dissection).  Inflammation around your heart (pericarditis).  A problem in the lungs, such as a blood clot (pulmonary embolism) or a collapsed lung (pneumothorax).  Some non life-threatening causes of chest pain include:  Heartburn.  Anxiety or stress.  Damage to the bones, muscles, and cartilage that make up your chest wall.  Pneumonia or bronchitis.  Shingles infection (varicella-zoster virus).  Your chest pain may come and go. It may also be constant. Your health care provider will do tests and other studies to find the cause of your pain. Treatment will depend on the cause of your chest pain.    Follow these instructions at home:  Medicines    Take over-the-counter and prescription medicines only as told by your health care provider.  If you were prescribed an antibiotic medicine, take it as told by your health care provider. Do not stop taking the antibiotic even if you start to feel better.  Activity    Avoid any activities that cause chest pain.  Do not lift anything that is heavier than 10 lb (4.5 kg), or the limit that you are told, until your health care provider says that it is safe.  Rest as directed by your health care provider.  Return to your normal activities only as told by your health care provider. Ask your health care provider what activities are safe for you.  Lifestyle    A plate along with examples of foods in a healthy diet.  Silhouette of a person sitting on the floor doing yoga.  Do not use any products that contain nicotine or tobacco, such as cigarettes, e-cigarettes, and chewing tobacco. If you need help quitting, ask your health care provider.  Do not drink alcohol.  Make healthy lifestyle changes as recommended. These may include:  Getting regular exercise. Ask your health care provider to suggest some exercises that are safe for you.  Eating a heart-healthy diet. This includes plenty of fresh fruits and vegetables, whole grains, low-fat (lean) protein, and low-fat dairy products. A dietitian can help you find healthy eating options.  Maintaining a healthy weight.  Managing any other health conditions you may have, such as high blood pressure (hypertension) or diabetes.  Reducing stress, such as with yoga or relaxation techniques.  General instructions    Pay attention to any changes in your symptoms.  It is up to you to get the results of any tests that were done. Ask your health care provider, or the department that is doing the tests, when your results will be ready.  Keep all follow-up visits as told by your health care provider. This is important.  You may be asked to go for further testing if your chest pain does not go away.  Contact a health care provider if:  Your chest pain does not go away.  You feel depressed.  You have a fever.  You notice changes in your symptoms or develop new symptoms.  Get help right away if:  Your chest pain gets worse.  You have a cough that gets worse, or you cough up blood.  You have severe pain in your abdomen.  You faint.  You have sudden, unexplained chest discomfort.  You have sudden, unexplained discomfort in your arms, back, neck, or jaw.  You have shortness of breath at any time.  You suddenly start to sweat, or your skin gets clammy.  You feel nausea or you vomit.  You suddenly feel lightheaded or dizzy.  You have severe weakness, or unexplained weakness or fatigue.  Your heart begins to beat quickly, or it feels like it is skipping beats.  These symptoms may represent a serious problem that is an emergency. Do not wait to see if the symptoms will go away. Get medical help right away. Call your local emergency services (911 in the [place] Do not drive yourself to the hospital.    Summary  Chest pain can be caused by a condition that is serious and requires urgent treatment. It may also be caused by something that is not life-threatening.  Your health care provider may do lab tests and other studies to find the cause of your pain.  Follow your health care provider's instructions on taking medicines, making lifestyle changes, and getting emergency treatment if symptoms become worse.  Keep all follow-up visits as told by your health care provider. This includes visits for any further testing if your chest pain does not go away.  This information is not intended to replace advice given to you by your health care provider. Make sure you discuss any questions you have with your health care provider.    Document Revised: 11/02/2023 Document Reviewed: 11/02/2023

## 2023-12-22 NOTE — ED PROVIDER NOTE - OBJECTIVE STATEMENT
67-year-old male PMH of prostate CA status post prostatectomy in 2020, not currently on any other treatment other than surveillance, history of asthma, HTN, HLD, seasonal allergies presents to the emergency department complaining of chest tightness for the past 2 weeks, seems to be progressively worse and radiating to his throat and left arm, patient feels anxious about it and told his wife about it today was brought into the emergency department.  Patient denies any fever, no vomiting, patient currently feels well at this time.

## 2023-12-23 ENCOUNTER — INPATIENT (INPATIENT)
Facility: HOSPITAL | Age: 68
LOS: 2 days | Discharge: HOME | DRG: 322 | End: 2023-12-26
Attending: HOSPITALIST | Admitting: INTERNAL MEDICINE
Payer: MEDICARE

## 2023-12-23 VITALS
DIASTOLIC BLOOD PRESSURE: 97 MMHG | WEIGHT: 205.91 LBS | SYSTOLIC BLOOD PRESSURE: 171 MMHG | HEART RATE: 77 BPM | OXYGEN SATURATION: 96 % | HEIGHT: 69 IN | TEMPERATURE: 98 F | RESPIRATION RATE: 19 BRPM

## 2023-12-23 DIAGNOSIS — Z98.890 OTHER SPECIFIED POSTPROCEDURAL STATES: Chronic | ICD-10-CM

## 2023-12-23 LAB
ALBUMIN SERPL ELPH-MCNC: 4.7 G/DL — SIGNIFICANT CHANGE UP (ref 3.3–5)
ALBUMIN SERPL ELPH-MCNC: 4.7 G/DL — SIGNIFICANT CHANGE UP (ref 3.3–5)
ALP SERPL-CCNC: 99 U/L — SIGNIFICANT CHANGE UP (ref 40–120)
ALP SERPL-CCNC: 99 U/L — SIGNIFICANT CHANGE UP (ref 40–120)
ALT FLD-CCNC: 40 U/L — SIGNIFICANT CHANGE UP (ref 10–45)
ALT FLD-CCNC: 40 U/L — SIGNIFICANT CHANGE UP (ref 10–45)
ANION GAP SERPL CALC-SCNC: 10 MMOL/L — SIGNIFICANT CHANGE UP (ref 5–17)
ANION GAP SERPL CALC-SCNC: 10 MMOL/L — SIGNIFICANT CHANGE UP (ref 5–17)
AST SERPL-CCNC: 38 U/L — SIGNIFICANT CHANGE UP (ref 10–40)
AST SERPL-CCNC: 38 U/L — SIGNIFICANT CHANGE UP (ref 10–40)
BASOPHILS # BLD AUTO: 0.03 K/UL — SIGNIFICANT CHANGE UP (ref 0–0.2)
BASOPHILS # BLD AUTO: 0.03 K/UL — SIGNIFICANT CHANGE UP (ref 0–0.2)
BASOPHILS NFR BLD AUTO: 0.4 % — SIGNIFICANT CHANGE UP (ref 0–2)
BASOPHILS NFR BLD AUTO: 0.4 % — SIGNIFICANT CHANGE UP (ref 0–2)
BILIRUB SERPL-MCNC: 0.7 MG/DL — SIGNIFICANT CHANGE UP (ref 0.2–1.2)
BILIRUB SERPL-MCNC: 0.7 MG/DL — SIGNIFICANT CHANGE UP (ref 0.2–1.2)
BUN SERPL-MCNC: 18 MG/DL — SIGNIFICANT CHANGE UP (ref 7–23)
BUN SERPL-MCNC: 18 MG/DL — SIGNIFICANT CHANGE UP (ref 7–23)
CALCIUM SERPL-MCNC: 9.6 MG/DL — SIGNIFICANT CHANGE UP (ref 8.4–10.5)
CALCIUM SERPL-MCNC: 9.6 MG/DL — SIGNIFICANT CHANGE UP (ref 8.4–10.5)
CHLORIDE SERPL-SCNC: 106 MMOL/L — SIGNIFICANT CHANGE UP (ref 96–108)
CHLORIDE SERPL-SCNC: 106 MMOL/L — SIGNIFICANT CHANGE UP (ref 96–108)
CO2 SERPL-SCNC: 24 MMOL/L — SIGNIFICANT CHANGE UP (ref 22–31)
CO2 SERPL-SCNC: 24 MMOL/L — SIGNIFICANT CHANGE UP (ref 22–31)
CREAT SERPL-MCNC: 0.81 MG/DL — SIGNIFICANT CHANGE UP (ref 0.5–1.3)
CREAT SERPL-MCNC: 0.81 MG/DL — SIGNIFICANT CHANGE UP (ref 0.5–1.3)
EGFR: 95 ML/MIN/1.73M2 — SIGNIFICANT CHANGE UP
EGFR: 95 ML/MIN/1.73M2 — SIGNIFICANT CHANGE UP
EOSINOPHIL # BLD AUTO: 0.09 K/UL — SIGNIFICANT CHANGE UP (ref 0–0.5)
EOSINOPHIL # BLD AUTO: 0.09 K/UL — SIGNIFICANT CHANGE UP (ref 0–0.5)
EOSINOPHIL NFR BLD AUTO: 1.2 % — SIGNIFICANT CHANGE UP (ref 0–6)
EOSINOPHIL NFR BLD AUTO: 1.2 % — SIGNIFICANT CHANGE UP (ref 0–6)
FLUAV AG NPH QL: SIGNIFICANT CHANGE UP
FLUAV AG NPH QL: SIGNIFICANT CHANGE UP
FLUBV AG NPH QL: SIGNIFICANT CHANGE UP
FLUBV AG NPH QL: SIGNIFICANT CHANGE UP
GLUCOSE SERPL-MCNC: 156 MG/DL — HIGH (ref 70–99)
GLUCOSE SERPL-MCNC: 156 MG/DL — HIGH (ref 70–99)
HCT VFR BLD CALC: 48.5 % — SIGNIFICANT CHANGE UP (ref 39–50)
HCT VFR BLD CALC: 48.5 % — SIGNIFICANT CHANGE UP (ref 39–50)
HGB BLD-MCNC: 16 G/DL — SIGNIFICANT CHANGE UP (ref 13–17)
HGB BLD-MCNC: 16 G/DL — SIGNIFICANT CHANGE UP (ref 13–17)
IMM GRANULOCYTES NFR BLD AUTO: 0.5 % — SIGNIFICANT CHANGE UP (ref 0–0.9)
IMM GRANULOCYTES NFR BLD AUTO: 0.5 % — SIGNIFICANT CHANGE UP (ref 0–0.9)
LYMPHOCYTES # BLD AUTO: 0.9 K/UL — LOW (ref 1–3.3)
LYMPHOCYTES # BLD AUTO: 0.9 K/UL — LOW (ref 1–3.3)
LYMPHOCYTES # BLD AUTO: 11.7 % — LOW (ref 13–44)
LYMPHOCYTES # BLD AUTO: 11.7 % — LOW (ref 13–44)
MCHC RBC-ENTMCNC: 29.3 PG — SIGNIFICANT CHANGE UP (ref 27–34)
MCHC RBC-ENTMCNC: 29.3 PG — SIGNIFICANT CHANGE UP (ref 27–34)
MCHC RBC-ENTMCNC: 33 GM/DL — SIGNIFICANT CHANGE UP (ref 32–36)
MCHC RBC-ENTMCNC: 33 GM/DL — SIGNIFICANT CHANGE UP (ref 32–36)
MCV RBC AUTO: 88.7 FL — SIGNIFICANT CHANGE UP (ref 80–100)
MCV RBC AUTO: 88.7 FL — SIGNIFICANT CHANGE UP (ref 80–100)
MONOCYTES # BLD AUTO: 0.49 K/UL — SIGNIFICANT CHANGE UP (ref 0–0.9)
MONOCYTES # BLD AUTO: 0.49 K/UL — SIGNIFICANT CHANGE UP (ref 0–0.9)
MONOCYTES NFR BLD AUTO: 6.4 % — SIGNIFICANT CHANGE UP (ref 2–14)
MONOCYTES NFR BLD AUTO: 6.4 % — SIGNIFICANT CHANGE UP (ref 2–14)
NEUTROPHILS # BLD AUTO: 6.11 K/UL — SIGNIFICANT CHANGE UP (ref 1.8–7.4)
NEUTROPHILS # BLD AUTO: 6.11 K/UL — SIGNIFICANT CHANGE UP (ref 1.8–7.4)
NEUTROPHILS NFR BLD AUTO: 79.8 % — HIGH (ref 43–77)
NEUTROPHILS NFR BLD AUTO: 79.8 % — HIGH (ref 43–77)
NRBC # BLD: 0 /100 WBCS — SIGNIFICANT CHANGE UP (ref 0–0)
NRBC # BLD: 0 /100 WBCS — SIGNIFICANT CHANGE UP (ref 0–0)
PLATELET # BLD AUTO: 263 K/UL — SIGNIFICANT CHANGE UP (ref 150–400)
PLATELET # BLD AUTO: 263 K/UL — SIGNIFICANT CHANGE UP (ref 150–400)
POTASSIUM SERPL-MCNC: 4.7 MMOL/L — SIGNIFICANT CHANGE UP (ref 3.5–5.3)
POTASSIUM SERPL-MCNC: 4.7 MMOL/L — SIGNIFICANT CHANGE UP (ref 3.5–5.3)
POTASSIUM SERPL-SCNC: 4.7 MMOL/L — SIGNIFICANT CHANGE UP (ref 3.5–5.3)
POTASSIUM SERPL-SCNC: 4.7 MMOL/L — SIGNIFICANT CHANGE UP (ref 3.5–5.3)
PROT SERPL-MCNC: 7.7 G/DL — SIGNIFICANT CHANGE UP (ref 6–8.3)
PROT SERPL-MCNC: 7.7 G/DL — SIGNIFICANT CHANGE UP (ref 6–8.3)
RBC # BLD: 5.47 M/UL — SIGNIFICANT CHANGE UP (ref 4.2–5.8)
RBC # BLD: 5.47 M/UL — SIGNIFICANT CHANGE UP (ref 4.2–5.8)
RBC # FLD: 13.1 % — SIGNIFICANT CHANGE UP (ref 10.3–14.5)
RBC # FLD: 13.1 % — SIGNIFICANT CHANGE UP (ref 10.3–14.5)
RSV RNA NPH QL NAA+NON-PROBE: SIGNIFICANT CHANGE UP
RSV RNA NPH QL NAA+NON-PROBE: SIGNIFICANT CHANGE UP
SARS-COV-2 RNA SPEC QL NAA+PROBE: SIGNIFICANT CHANGE UP
SARS-COV-2 RNA SPEC QL NAA+PROBE: SIGNIFICANT CHANGE UP
SODIUM SERPL-SCNC: 140 MMOL/L — SIGNIFICANT CHANGE UP (ref 135–145)
SODIUM SERPL-SCNC: 140 MMOL/L — SIGNIFICANT CHANGE UP (ref 135–145)
TROPONIN T, HIGH SENSITIVITY RESULT: 20 NG/L — SIGNIFICANT CHANGE UP (ref 0–51)
TROPONIN T, HIGH SENSITIVITY RESULT: 20 NG/L — SIGNIFICANT CHANGE UP (ref 0–51)
TROPONIN T, HIGH SENSITIVITY RESULT: 29 NG/L — SIGNIFICANT CHANGE UP (ref 0–51)
TROPONIN T, HIGH SENSITIVITY RESULT: 29 NG/L — SIGNIFICANT CHANGE UP (ref 0–51)
TROPONIN T, HIGH SENSITIVITY RESULT: 44 NG/L — SIGNIFICANT CHANGE UP (ref 0–51)
TROPONIN T, HIGH SENSITIVITY RESULT: 44 NG/L — SIGNIFICANT CHANGE UP (ref 0–51)
WBC # BLD: 7.66 K/UL — SIGNIFICANT CHANGE UP (ref 3.8–10.5)
WBC # BLD: 7.66 K/UL — SIGNIFICANT CHANGE UP (ref 3.8–10.5)
WBC # FLD AUTO: 7.66 K/UL — SIGNIFICANT CHANGE UP (ref 3.8–10.5)
WBC # FLD AUTO: 7.66 K/UL — SIGNIFICANT CHANGE UP (ref 3.8–10.5)

## 2023-12-23 PROCEDURE — 71045 X-RAY EXAM CHEST 1 VIEW: CPT | Mod: 26

## 2023-12-23 PROCEDURE — 99223 1ST HOSP IP/OBS HIGH 75: CPT

## 2023-12-23 RX ORDER — LOSARTAN POTASSIUM 100 MG/1
50 TABLET, FILM COATED ORAL DAILY
Refills: 0 | Status: DISCONTINUED | OUTPATIENT
Start: 2023-12-23 | End: 2023-12-26

## 2023-12-23 RX ORDER — ASPIRIN/CALCIUM CARB/MAGNESIUM 324 MG
162 TABLET ORAL ONCE
Refills: 0 | Status: COMPLETED | OUTPATIENT
Start: 2023-12-23 | End: 2023-12-23

## 2023-12-23 RX ORDER — ATORVASTATIN CALCIUM 80 MG/1
20 TABLET, FILM COATED ORAL AT BEDTIME
Refills: 0 | Status: DISCONTINUED | OUTPATIENT
Start: 2023-12-23 | End: 2023-12-24

## 2023-12-23 RX ORDER — ACETAMINOPHEN 500 MG
975 TABLET ORAL ONCE
Refills: 0 | Status: COMPLETED | OUTPATIENT
Start: 2023-12-23 | End: 2023-12-23

## 2023-12-23 RX ADMIN — Medication 975 MILLIGRAM(S): at 15:15

## 2023-12-23 RX ADMIN — Medication 162 MILLIGRAM(S): at 10:34

## 2023-12-23 RX ADMIN — ATORVASTATIN CALCIUM 20 MILLIGRAM(S): 80 TABLET, FILM COATED ORAL at 21:19

## 2023-12-23 RX ADMIN — LOSARTAN POTASSIUM 50 MILLIGRAM(S): 100 TABLET, FILM COATED ORAL at 21:20

## 2023-12-23 NOTE — ED ADULT NURSE NOTE - NSFALLUNIVINTERV_ED_ALL_ED
Bed/Stretcher in lowest position, wheels locked, appropriate side rails in place/Call bell, personal items and telephone in reach/Instruct patient to call for assistance before getting out of bed/chair/stretcher/Non-slip footwear applied when patient is off stretcher/[place] to call system/Physically safe environment - no spills, clutter or unnecessary equipment/Purposeful proactive rounding/Room/bathroom lighting operational, light cord in reach Bed/Stretcher in lowest position, wheels locked, appropriate side rails in place/Call bell, personal items and telephone in reach/Instruct patient to call for assistance before getting out of bed/chair/stretcher/Non-slip footwear applied when patient is off stretcher/[place] to call system/Physically safe environment - no spills, clutter or unnecessary equipment/Purposeful proactive rounding/Room/bathroom lighting operational, light cord in reach

## 2023-12-23 NOTE — ED ADULT NURSE NOTE - OBJECTIVE STATEMENT
67 year old male patient presents ambulatory to ED c/o chest pain x 10 days. Patient seen at [facility] ED yesterday for same reason and was told to return if pain worsened. Patient states he awoke at 4am to go to the bathroom and the pain returned until about 7am and resolved. Patient also reporting cough and congestion. Denies current HA, dizziness, CP, palptiations, SOB, abd pain, n/v/d, fever chills. Patient aware of plan of care for monitoring. EKG completed and patient placed on CM. 67 year old male patient presents ambulatory to ED c/o chest pain x 10 days. Patient seen at [facility] ED yesterday for same reason and was told to return if pain worsened. Patient states he awoke at 4am to go to the bathroom and the pain returned until about 7am and resolved. Patient also reporting cough and congestion. Denies current HA, dizziness, CP, palptiations, SOB, abd pain, n/v/d, fever chills. Patient aware of plan of care for monitoring. EKG completed and patient placed on CM.

## 2023-12-23 NOTE — ED ADULT TRIAGE NOTE - CHIEF COMPLAINT QUOTE
intermittent cp x 2 weeks  seen at [facility] yesterday intermittent cp x 2 weeks  seen at [facility] yesterday

## 2023-12-23 NOTE — ED ADULT TRIAGE NOTE - MODE OF ARRIVAL
INPATIENT ENCOUNTER  OB DISCHARGE SUMMARY NOTE    ADMISSION DATE:  2023  DISCHARGE DATE:  2023    ADMISSION DIAGNOSIS:  1. 39 plus week intrauterine pregnancy  2. Induction of labor  3. Advanced maternal age  4. Polyhydraminos  5. Asthma  6. Hypothyroidism  7. Anxiety/Depression    DISCHARGE DIAGNOSIS:  1. Term viable female infant  2.   3. AMA  4. Polyhydraminos  5. Asthma  6. Hypothyroidism  7. Anxiety/Depression    DATE OF DELIVERY:  2023  at 7:26 PM     DELIVERING PROVIDER: Vibha Maharaj     DELIVERY TYPE:  Vaginal, Spontaneous     EPISIOTOMY/LACERATION THAT WAS REPAIRED:  None    HOSPITAL COURSE:  Patient presented for IOL. Labor progressed and , see delivery note for details. The patient was stable and afebrile in her post-partum course.  Her post-partum course was uncomplicated. The patient is providing Breast milk, and requests undecided for contraception.  Patient's blood type is O Rh Positive.  Rhogam was not given.      LABS:  Hgb 11.5    DISPOSITION:  Home/Self care    CONDITION ON DISCHARGE:  Stable    MEDICATIONS:     Summary of your Discharge Medications      Take these Medications      Details   FLUOXETINE HCL PO      ibuprofen 600 MG tablet  Commonly known as: MOTRIN   Take 1 tablet by mouth every 6 hours as needed for Pain.     levothyroxine 88 MCG tablet   Take 88 mcg by mouth daily.     multivitamin & mineral w/folic acid- PRENATAL 27-1 MG Tab   Take 1 tablet by mouth daily.            POSTPARTUM INSTRUCTION SHEET:  Given    FOLLOW UP:  Follow-up appointment with Dr. Maharaj in 6 weeks.    Jonathan Tinoco MD  
Walk in

## 2023-12-23 NOTE — ED PROVIDER NOTE - CLINICAL SUMMARY MEDICAL DECISION MAKING FREE TEXT BOX
Shane 67-year-old male with hypertension high cholesterol with 10 days of intermittent exertional chest pain and substernal radiating to jaw and left arm associated with mild dyspnea no nausea no diaphoresis no abdominal pain patient reports mild cough x 1 week no shortness of breath seen yesterday for worsening chest pain at [facility] cardiac enzymes negative x 1 told to return for worsening symptoms pain returned after discharge last minutes to hours at a time relieved at rest on examination vitals hemodynamically stable lungs clear bilaterally no pain to palpation no vesicular rash EKG with biphasic T waves in the anterior leads last stress test over a year ago was unremarkable with persistent chest pain and cardiac risk factors patient will likely need provocative testing consider observation for stress telemetry and serial biomarkers Shane 67-year-old male with hypertension high cholesterol with 10 days of intermittent exertional chest pain and substernal radiating to jaw and left arm associated with mild dyspnea no nausea no diaphoresis no abdominal pain patient reports mild cough x 1 week no shortness of breath seen yesterday for worsening chest pain at [facility] cardiac enzymes negative x 1 told to return for worsening symptoms pain returned after discharge last minutes to hours at a time relieved at rest on examination vitals hemodynamically stable lungs clear bilaterally no pain to palpation no vesicular rash EKG with biphasic T waves in the anterior leads last stress test over a year ago was unremarkable with persistent chest pain and cardiac risk factors patient will likely need provocative testing consider observation for stress telemetry and serial biomarkers

## 2023-12-23 NOTE — ED CDU PROVIDER DISPOSITION NOTE - CLINICAL COURSE
67-year-old male with past medical history of hypertension, hyperlipidemia presenting with chest pain.  Patient reports that he has had intermittent episodes of chest pain over the last 10 days.  Chest pain seems to be worse with exertion.  Patient was seen at outside hospital yesterday ruled out for MI and discharged home.  Patient presenting today with recurrent symptoms.  Patient had a plain exercise stress test in May 2023 that was unremarkable (not nuclear).  Non-smoker.  Family history of CHF and his father passed away in his 60s.  Unclear cause of heart failure.  Upon my evaluation currently patient is asymptomatic.  Denies fever/chills, shortness of breath, abdominal pain, nausea, vomiting, diarrhea, dysuria.  In the ED VSS. Trop 44.  CXR clear.  Plan to observe in the CDU on tele overnight for TTE and Nuc stress 67-year-old male with past medical history of hypertension, hyperlipidemia presenting with chest pain.  Patient reports that he has had intermittent episodes of chest pain over the last 10 days.  Chest pain seems to be worse with exertion.  Patient was seen at outside hospital yesterday ruled out for MI and discharged home.  Patient presenting today with recurrent symptoms.  Patient had a plain exercise stress test in May 2023 that was unremarkable (not nuclear).  Non-smoker.  Family history of CHF and his father passed away in his 60s.  Unclear cause of heart failure.  Upon my evaluation currently patient is asymptomatic.  Denies fever/chills, shortness of breath, abdominal pain, nausea, vomiting, diarrhea, dysuria.  In the ED VSS. Trop 44.  CXR clear.  Plan to observe in the CDU on tele overnight for TTE and Nuc stress.   In CDU, Pt resting comfortably. NAD. No complaints. VSS. pt with chest pain 5 min into stress test, +EKG changes in V2/V3, resting images showing apical and inferior wall changes. d/w Dr. Garrison who pt plans on following up with outpt, would treat pt for unstable angina with heparin and aspirin. will admit to medicine. d/w Dr. JASSON Worthy.

## 2023-12-23 NOTE — ED ADULT NURSE NOTE - CHIEF COMPLAINT QUOTE
intermittent cp x 2 weeks  seen at [place] yesterday intermittent cp x 2 weeks  seen at [place] yesterday

## 2023-12-23 NOTE — ED ADULT NURSE REASSESSMENT NOTE - NS ED NURSE REASSESS COMMENT FT1
12noon  Received the Pt from  [facility] Philip  Pt is Observed for Chest pain for Stress test  Received the Pt A&OX 4  Pt obeys commands Greta N/V/D fever chills cp SOB   Comfort care & safety measures continued  IV site looks clean & dry no signs of infiltration noted pt denies  pain IV site .Pt is oriented to the unit Plan of care explained .  Pt is advised to call for help  call bell with in the reach pt verbalized the understanding .  pending CDU  MD wilkes . GCS 15/15 A&OX 4 PERRLA  size 3 Strong upper & lower extremities steady gait  Pt is ambulatory & independent   No facial droop  No Hand Leg drop denies numbness tingling  Plan of care ongoing

## 2023-12-23 NOTE — ED ADULT NURSE REASSESSMENT NOTE - NS ED NURSE REASSESS COMMENT FT1
Pt received from [facility] Russell. Pt oriented to CDU & plan of care was discussed. Pt A&O x 4. Pt in CDU for TTE, tele monitoring, and nuclear stress test. Pt denies any chest pain, SOB, dizziness or palpitations at this time. V/S stable, pt afebrile,  IV in place, patent and free of signs of infiltration. Pt resting in bed. Safety & comfort measures maintained. Call bell in reach. Care continues.

## 2023-12-23 NOTE — ED CDU PROVIDER INITIAL DAY NOTE - CLINICAL SUMMARY MEDICAL DECISION MAKING FREE TEXT BOX
jerilyn See ED provider note 67-year-old with exertional chest pain EKG with biphasic T waves in V to V3 telemetry monitoring frequent reevaluations and provocative testing with stress test

## 2023-12-23 NOTE — ED CDU PROVIDER INITIAL DAY NOTE - PROGRESS NOTE DETAILS
patient seen and evaluated at bedside. offers no medical complaints at this time. no chest pain at this time. VSS. well appearing. will continue to monitor.

## 2023-12-23 NOTE — ED CDU PROVIDER INITIAL DAY NOTE - OBJECTIVE STATEMENT
67-year-old male with past medical history of hypertension, hyperlipidemia presenting with chest pain.  Patient reports that he has had intermittent episodes of chest pain over the last 10 days.  Chest pain seems to be worse with exertion.  Patient was seen at outside hospital yesterday ruled out for MI and discharged home.  Patient presenting today with recurrent symptoms.  Patient had a plain exercise stress test in May 2023 that was unremarkable (not nuclear).  Non-smoker.  Family history of CHF and his father passed away in his 60s.  Unclear cause of heart failure.  Upon my evaluation currently patient is asymptomatic.  Denies fever/chills, shortness of breath, abdominal pain, nausea, vomiting, diarrhea, dysuria.    In the ED VSS. Trop 44.  CXR clear.  Plan to observe in the CDU on tele overnight for TTE and Nuc stress

## 2023-12-23 NOTE — ED CDU PROVIDER DISPOSITION NOTE - NSFOLLOWUPINSTRUCTIONS_ED_ALL_ED_FT
1. It is important to follow up with your primary care doctor in 1-2 days  it is important to follow up with your cardiologist in 1-2 days     2. bring a copy of all your results to your follow up appointments    3. you can take Tylenol as needed for pain. you can take 650mg of Tylenol every 6 hours as needed for pain. do not take more than 4000mg in a 24 hour period.     4. if your symptoms worsen, persist, or if any new symptoms develop, or if you experience any signs of distress, return to the ER right away.
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
## 2023-12-24 DIAGNOSIS — I20.0 UNSTABLE ANGINA: ICD-10-CM

## 2023-12-24 LAB
A1C WITH ESTIMATED AVERAGE GLUCOSE RESULT: 5.9 % — HIGH (ref 4–5.6)
A1C WITH ESTIMATED AVERAGE GLUCOSE RESULT: 5.9 % — HIGH (ref 4–5.6)
APTT BLD: 31.8 SEC — SIGNIFICANT CHANGE UP (ref 24.5–35.6)
APTT BLD: 31.8 SEC — SIGNIFICANT CHANGE UP (ref 24.5–35.6)
CHOLEST SERPL-MCNC: 224 MG/DL — HIGH
CHOLEST SERPL-MCNC: 224 MG/DL — HIGH
ESTIMATED AVERAGE GLUCOSE: 123 MG/DL — HIGH (ref 68–114)
ESTIMATED AVERAGE GLUCOSE: 123 MG/DL — HIGH (ref 68–114)
HDLC SERPL-MCNC: 44 MG/DL — SIGNIFICANT CHANGE UP
HDLC SERPL-MCNC: 44 MG/DL — SIGNIFICANT CHANGE UP
INR BLD: 1.05 RATIO — SIGNIFICANT CHANGE UP (ref 0.85–1.18)
INR BLD: 1.05 RATIO — SIGNIFICANT CHANGE UP (ref 0.85–1.18)
LIPID PNL WITH DIRECT LDL SERPL: 133 MG/DL — HIGH
LIPID PNL WITH DIRECT LDL SERPL: 133 MG/DL — HIGH
NON HDL CHOLESTEROL: 180 MG/DL — HIGH
NON HDL CHOLESTEROL: 180 MG/DL — HIGH
PROTHROM AB SERPL-ACNC: 11 SEC — SIGNIFICANT CHANGE UP (ref 9.5–13)
PROTHROM AB SERPL-ACNC: 11 SEC — SIGNIFICANT CHANGE UP (ref 9.5–13)
TRIGL SERPL-MCNC: 260 MG/DL — HIGH
TRIGL SERPL-MCNC: 260 MG/DL — HIGH

## 2023-12-24 PROCEDURE — 99238 HOSP IP/OBS DSCHRG MGMT 30/<: CPT

## 2023-12-24 PROCEDURE — 93306 TTE W/DOPPLER COMPLETE: CPT | Mod: 26

## 2023-12-24 PROCEDURE — 99152 MOD SED SAME PHYS/QHP 5/>YRS: CPT

## 2023-12-24 PROCEDURE — 92928 PRQ TCAT PLMT NTRAC ST 1 LES: CPT | Mod: LD

## 2023-12-24 PROCEDURE — 93018 CV STRESS TEST I&R ONLY: CPT

## 2023-12-24 PROCEDURE — 93010 ELECTROCARDIOGRAM REPORT: CPT | Mod: 76

## 2023-12-24 PROCEDURE — 78451 HT MUSCLE IMAGE SPECT SING: CPT | Mod: 26

## 2023-12-24 PROCEDURE — 93016 CV STRESS TEST SUPVJ ONLY: CPT

## 2023-12-24 PROCEDURE — 93458 L HRT ARTERY/VENTRICLE ANGIO: CPT | Mod: 26,59

## 2023-12-24 RX ORDER — ASPIRIN/CALCIUM CARB/MAGNESIUM 324 MG
81 TABLET ORAL DAILY
Refills: 0 | Status: DISCONTINUED | OUTPATIENT
Start: 2023-12-25 | End: 2023-12-26

## 2023-12-24 RX ORDER — HEPARIN SODIUM 5000 [USP'U]/ML
INJECTION INTRAVENOUS; SUBCUTANEOUS
Qty: 25000 | Refills: 0 | Status: DISCONTINUED | OUTPATIENT
Start: 2023-12-24 | End: 2023-12-24

## 2023-12-24 RX ORDER — TICAGRELOR 90 MG/1
90 TABLET ORAL EVERY 12 HOURS
Refills: 0 | Status: DISCONTINUED | OUTPATIENT
Start: 2023-12-24 | End: 2023-12-26

## 2023-12-24 RX ORDER — ACETAMINOPHEN 500 MG
975 TABLET ORAL ONCE
Refills: 0 | Status: COMPLETED | OUTPATIENT
Start: 2023-12-24 | End: 2023-12-24

## 2023-12-24 RX ORDER — NITROGLYCERIN 6.5 MG
0.4 CAPSULE, EXTENDED RELEASE ORAL ONCE
Refills: 0 | Status: COMPLETED | OUTPATIENT
Start: 2023-12-24 | End: 2023-12-24

## 2023-12-24 RX ORDER — ASPIRIN/CALCIUM CARB/MAGNESIUM 324 MG
162 TABLET ORAL ONCE
Refills: 0 | Status: COMPLETED | OUTPATIENT
Start: 2023-12-24 | End: 2023-12-24

## 2023-12-24 RX ORDER — ATORVASTATIN CALCIUM 80 MG/1
80 TABLET, FILM COATED ORAL AT BEDTIME
Refills: 0 | Status: DISCONTINUED | OUTPATIENT
Start: 2023-12-24 | End: 2023-12-26

## 2023-12-24 RX ORDER — HEPARIN SODIUM 5000 [USP'U]/ML
5000 INJECTION INTRAVENOUS; SUBCUTANEOUS ONCE
Refills: 0 | Status: COMPLETED | OUTPATIENT
Start: 2023-12-24 | End: 2023-12-24

## 2023-12-24 RX ORDER — ACETAMINOPHEN 500 MG
650 TABLET ORAL ONCE
Refills: 0 | Status: COMPLETED | OUTPATIENT
Start: 2023-12-24 | End: 2023-12-24

## 2023-12-24 RX ORDER — TICAGRELOR 90 MG/1
180 TABLET ORAL ONCE
Refills: 0 | Status: COMPLETED | OUTPATIENT
Start: 2023-12-24 | End: 2023-12-24

## 2023-12-24 RX ORDER — HEPARIN SODIUM 5000 [USP'U]/ML
5000 INJECTION INTRAVENOUS; SUBCUTANEOUS ONCE
Refills: 0 | Status: DISCONTINUED | OUTPATIENT
Start: 2023-12-24 | End: 2023-12-24

## 2023-12-24 RX ORDER — HEPARIN SODIUM 5000 [USP'U]/ML
5600 INJECTION INTRAVENOUS; SUBCUTANEOUS EVERY 6 HOURS
Refills: 0 | Status: DISCONTINUED | OUTPATIENT
Start: 2023-12-24 | End: 2023-12-24

## 2023-12-24 RX ADMIN — Medication 162 MILLIGRAM(S): at 12:05

## 2023-12-24 RX ADMIN — Medication 975 MILLIGRAM(S): at 03:25

## 2023-12-24 RX ADMIN — HEPARIN SODIUM 5000 UNIT(S): 5000 INJECTION INTRAVENOUS; SUBCUTANEOUS at 11:31

## 2023-12-24 RX ADMIN — Medication 650 MILLIGRAM(S): at 15:58

## 2023-12-24 RX ADMIN — TICAGRELOR 90 MILLIGRAM(S): 90 TABLET ORAL at 17:21

## 2023-12-24 RX ADMIN — Medication 650 MILLIGRAM(S): at 16:28

## 2023-12-24 RX ADMIN — LOSARTAN POTASSIUM 50 MILLIGRAM(S): 100 TABLET, FILM COATED ORAL at 22:33

## 2023-12-24 RX ADMIN — TICAGRELOR 180 MILLIGRAM(S): 90 TABLET ORAL at 12:06

## 2023-12-24 RX ADMIN — ATORVASTATIN CALCIUM 80 MILLIGRAM(S): 80 TABLET, FILM COATED ORAL at 22:33

## 2023-12-24 RX ADMIN — Medication 162 MILLIGRAM(S): at 10:51

## 2023-12-24 RX ADMIN — HEPARIN SODIUM 1000 UNIT(S)/HR: 5000 INJECTION INTRAVENOUS; SUBCUTANEOUS at 11:31

## 2023-12-24 NOTE — PROGRESS NOTE ADULT - SUBJECTIVE AND OBJECTIVE BOX
Patient is a 67y old  Male who presents with a chief complaint of     INTERVAL HISTORY:  -    SUBJECTIVE  - Patient seen and evaluated at bedside.     MEDICATIONS:  MEDICATIONS  (STANDING):  atorvastatin 80 milliGRAM(s) Oral at bedtime  losartan 50 milliGRAM(s) Oral daily  ticagrelor 90 milliGRAM(s) Oral every 12 hours    MEDICATIONS  (PRN):      OBJECTIVE:  ICU Vital Signs Last 24 Hrs  T(C): 37 (24 Dec 2023 20:00), Max: 37 (24 Dec 2023 20:00)  T(F): 98.6 (24 Dec 2023 20:00), Max: 98.6 (24 Dec 2023 20:00)  HR: 76 (24 Dec 2023 20:00) (68 - 79)  BP: 156/83 (24 Dec 2023 20:00) (110/79 - 184/109)  BP(mean): 109 (24 Dec 2023 20:00) (89 - 120)  ABP: --  ABP(mean): --  RR: 20 (24 Dec 2023 20:00) (11 - 24)  SpO2: 100% (24 Dec 2023 20:00) (96% - 100%)    O2 Parameters below as of 24 Dec 2023 20:00  Patient On (Oxygen Delivery Method): room air            Adult Advanced Hemodynamics Last 24 Hrs  CVP(mm Hg): --  CVP(cm H2O): --  CO: --  CI: --  PA: --  PA(mean): --  PCWP: --  SVR: --  SVRI: --  PVR: --  PVRI: --  CAPILLARY BLOOD GLUCOSE        CAPILLARY BLOOD GLUCOSE        I&O's Summary    24 Dec 2023 07:01  -  24 Dec 2023 21:04  --------------------------------------------------------  IN: 480 mL / OUT: 550 mL / NET: -70 mL      Daily     Daily     PHYSICAL EXAM:  General: NAD, well-groomed, well-developed  Eyes: Conjunctiva and sclera clear  ENMT: Moist mucous membranes  Neck: Supple  Chest: Clear to auscultation bilaterally; no rales, rhonchi, or wheezing  Heart: Regular rate and rhythm; normal S1 and S2; no murmurs, rubs, or gallops  Abd: Soft, nontender, nondistended  Nervous System: AAOX3  Ext: no peripheral LE edema bilaterally    LABS:                        16.0   7.66  )-----------( 263      ( 23 Dec 2023 10:35 )             48.5     12-23    140  |  106  |  18  ----------------------------<  156<H>  4.7   |  24  |  0.81    Ca    9.6      23 Dec 2023 10:35    TPro  7.7  /  Alb  4.7  /  TBili  0.7  /  DBili  x   /  AST  38  /  ALT  40  /  AlkPhos  99  12-23    LIVER FUNCTIONS - ( 23 Dec 2023 10:35 )  Alb: 4.7 g/dL / Pro: 7.7 g/dL / ALK PHOS: 99 U/L / ALT: 40 U/L / AST: 38 U/L / GGT: x           PT/INR - ( 24 Dec 2023 11:05 )   PT: 11.0 sec;   INR: 1.05 ratio         PTT - ( 24 Dec 2023 11:05 )  PTT:31.8 sec        Urinalysis Basic - ( 23 Dec 2023 10:35 )    Color: x / Appearance: x / SG: x / pH: x  Gluc: 156 mg/dL / Ketone: x  / Bili: x / Urobili: x   Blood: x / Protein: x / Nitrite: x   Leuk Esterase: x / RBC: x / WBC x   Sq Epi: x / Non Sq Epi: x / Bacteria: x          Plan:  ====================== NEUROLOGY=====================  - continue to monitor mental status as per protocol     ==================== RESPIRATORY======================  - continue to monitor SpO2 with goal >94%    Mechanical Vent:     ====================CARDIOVASCULAR==================      ===================== RENAL =========================  - Continue monitoring urine output, lytes, SCr/ BUN  - replete lytes prn with goal K >4 and Mg >2    =============== GASTROINTESTINAL===================      ===================ENDO====================      ===================HEMATOLOGIC/ONC ===================  - Monitor H/H and plts  - DVT PPX:     ==================INFECTIOUS DISEASE================  - monitor and trend WBC and temperature curve     Dispo:    Patient requires continuous monitoring with bedside rhythm monitoring, arterial line, pulse oximetry, ventilator monitoring and intermittent blood gas analysis.  Care plan discussed with ICU care team.  I have spent 35 minutes providing critical care, in addition to initial critical time provided by CICU attending Dr. Calixto, re-evaluated multiple times during the day.    Rita Iglesias PA-C Patient is a 67y old  Male who presents with a chief complaint of     INTERVAL HISTORY:  - s/p LHC x1 ANAYELI to mLAD  - started on Losartan 50 for GDMT  - hemodynamically stable    SUBJECTIVE  - Patient seen and evaluated at bedside.     MEDICATIONS:  MEDICATIONS  (STANDING):  atorvastatin 80 milliGRAM(s) Oral at bedtime  losartan 50 milliGRAM(s) Oral daily  ticagrelor 90 milliGRAM(s) Oral every 12 hours    OBJECTIVE:  ICU Vital Signs Last 24 Hrs  T(C): 37 (24 Dec 2023 20:00), Max: 37 (24 Dec 2023 20:00)  T(F): 98.6 (24 Dec 2023 20:00), Max: 98.6 (24 Dec 2023 20:00)  HR: 76 (24 Dec 2023 20:00) (68 - 79)  BP: 156/83 (24 Dec 2023 20:00) (110/79 - 184/109)  BP(mean): 109 (24 Dec 2023 20:00) (89 - 120)  RR: 20 (24 Dec 2023 20:00) (11 - 24)  SpO2: 100% (24 Dec 2023 20:00) (96% - 100%)    O2 Parameters below as of 24 Dec 2023 20:00  Patient On (Oxygen Delivery Method): room air    I&O's Summary  24 Dec 2023 07:01  -  24 Dec 2023 21:04  --------------------------------------------------------  IN: 480 mL / OUT: 550 mL / NET: -70 mL    PHYSICAL EXAM:  General: NAD  Chest: Clear to auscultation bilaterally; no rales, rhonchi, or wheezing  Heart: Regular rate and rhythm; normal S1 and S2; no murmurs, rubs, or gallops  Abd: Soft, nontender, nondistended  Nervous System: AAOX3  Ext: Right groin site stable    LABS:                 16.0   7.66  )-----------( 263      ( 23 Dec 2023 10:35 )             48.5     12-23    140  |  106  |  18  ----------------------------<  156<H>  4.7   |  24  |  0.81    Ca    9.6      23 Dec 2023 10:35    TPro  7.7  /  Alb  4.7  /  TBili  0.7  /  DBili  x   /  AST  38  /  ALT  40  /  AlkPhos  99  12-23    LIVER FUNCTIONS - ( 23 Dec 2023 10:35 )  Alb: 4.7 g/dL / Pro: 7.7 g/dL / ALK PHOS: 99 U/L / ALT: 40 U/L / AST: 38 U/L / GGT: x           PT/INR - ( 24 Dec 2023 11:05 )   PT: 11.0 sec;   INR: 1.05 ratio    PTT - ( 24 Dec 2023 11:05 )  PTT:31.8 sec    Urinalysis Basic - ( 23 Dec 2023 10:35 )    Color: x / Appearance: x / SG: x / pH: x  Gluc: 156 mg/dL / Ketone: x  / Bili: x / Urobili: x   Blood: x / Protein: x / Nitrite: x   Leuk Esterase: x / RBC: x / WBC x   Sq Epi: x / Non Sq Epi: x / Bacteria: x          Plan:  ====================== NEUROLOGY=====================  - continue to monitor mental status as per protocol     ==================== RESPIRATORY======================  - continue to monitor SpO2 with goal >94%    Mechanical Vent:     ====================CARDIOVASCULAR==================      ===================== RENAL =========================  - Continue monitoring urine output, lytes, SCr/ BUN  - replete lytes prn with goal K >4 and Mg >2    =============== GASTROINTESTINAL===================      ===================ENDO====================      ===================HEMATOLOGIC/ONC ===================  - Monitor H/H and plts  - DVT PPX:     ==================INFECTIOUS DISEASE================  - monitor and trend WBC and temperature curve     Dispo:    Patient requires continuous monitoring with bedside rhythm monitoring, arterial line, pulse oximetry, ventilator monitoring and intermittent blood gas analysis.  Care plan discussed with ICU care team.  I have spent 35 minutes providing critical care, in addition to initial critical time provided by CICU attending Dr. Calixto, re-evaluated multiple times during the day.    Rita Iglesias PA-C Patient is a 67y old  Male who presents with a chief complaint of     INTERVAL HISTORY:  - s/p LHC x1 ANAYELI to mLAD  - started on Losartan 50 for GDMT  - hemodynamically stable    SUBJECTIVE  - Patient seen and evaluated at bedside.     MEDICATIONS:  MEDICATIONS  (STANDING):  atorvastatin 80 milliGRAM(s) Oral at bedtime  losartan 50 milliGRAM(s) Oral daily  ticagrelor 90 milliGRAM(s) Oral every 12 hours    OBJECTIVE:  ICU Vital Signs Last 24 Hrs  T(C): 37 (24 Dec 2023 20:00), Max: 37 (24 Dec 2023 20:00)  T(F): 98.6 (24 Dec 2023 20:00), Max: 98.6 (24 Dec 2023 20:00)  HR: 76 (24 Dec 2023 20:00) (68 - 79)  BP: 156/83 (24 Dec 2023 20:00) (110/79 - 184/109)  BP(mean): 109 (24 Dec 2023 20:00) (89 - 120)  RR: 20 (24 Dec 2023 20:00) (11 - 24)  SpO2: 100% (24 Dec 2023 20:00) (96% - 100%)    O2 Parameters below as of 24 Dec 2023 20:00  Patient On (Oxygen Delivery Method): room air    I&O's Summary  24 Dec 2023 07:01  -  24 Dec 2023 21:04  --------------------------------------------------------  IN: 480 mL / OUT: 550 mL / NET: -70 mL    PHYSICAL EXAM:  General: NAD  Chest: Clear to auscultation bilaterally; no rales, rhonchi, or wheezing  Heart: Regular rate and rhythm; normal S1 and S2; no murmurs, rubs, or gallops  Abd: Soft, nontender, nondistended  Nervous System: AAOX3  Ext: Right groin site stable    LABS:                 16.0   7.66  )-----------( 263      ( 23 Dec 2023 10:35 )             48.5     12-23    140  |  106  |  18  ----------------------------<  156<H>  4.7   |  24  |  0.81    Ca    9.6      23 Dec 2023 10:35    TPro  7.7  /  Alb  4.7  /  TBili  0.7  /  DBili  x   /  AST  38  /  ALT  40  /  AlkPhos  99      LIVER FUNCTIONS - ( 23 Dec 2023 10:35 )  Alb: 4.7 g/dL / Pro: 7.7 g/dL / ALK PHOS: 99 U/L / ALT: 40 U/L / AST: 38 U/L / GGT: x           PT/INR - ( 24 Dec 2023 11:05 )   PT: 11.0 sec;   INR: 1.05 ratio    PTT - ( 24 Dec 2023 11:05 )  PTT:31.8 sec    Urinalysis Basic - ( 23 Dec 2023 10:35 )    Color: x / Appearance: x / SG: x / pH: x  Gluc: 156 mg/dL / Ketone: x  / Bili: x / Urobili: x   Blood: x / Protein: x / Nitrite: x   Leuk Esterase: x / RBC: x / WBC x   Sq Epi: x / Non Sq Epi: x / Bacteria: x      Assessment: 67M with past medical history of hypertension, hyperlipidemia, anxiety, asthma presenting with intermittent episodes of chest pain over the last 10-14 days, found to have STEMI, now s/p stent to mLAD.    Plan:  ====================== NEUROLOGY=====================  A&Ox3  - continue to monitor mental status as per protocol     ==================== RESPIRATORY======================  Stable on room air  - continue to monitor SpO2 with goal >94%  - symbicort, albuterol for chronic asthma    ====================CARDIOVASCULAR==================  STEMI  -  LHC: x1 ANAYELI mLAD 90%  - DAPT: ASA/Brilinta  - Continue high-dose statin Lipitor 80  - GDMT: Losartan 50mg qd   - Increase GDMT as BP tolerates  - TTE in AM. Study on  poor quality  - Right groin site stable, soft, dressing c/d/i    Hypertentsion  - C/w Losartan 50mg at home    ===================== RENAL =========================  No acute issues  - Continue monitoring urine output, lytes, SCr/ BUN  - replete lytes prn with goal K >4 and Mg >2    =============== GASTROINTESTINAL===================  DASH diet    ===================ENDO====================  Hyperlipidemia  - Cholesterol: 224, T, HDL: 44, LDL: 133  - Atorvastatin 80 mg    ===================HEMATOLOGIC/ONC ===================  H/H & plts   - Monitor H/H and plts  - DVT PPX: SQH in AM (if groin site continues to be stable)    ==================INFECTIOUS DISEASE================  No acute issues  - monitor and trend WBC and temperature curve     Patient requires continuous monitoring with bedside rhythm monitoring, arterial line, pulse oximetry, ventilator monitoring and intermittent blood gas analysis.  Care plan discussed with ICU care team.  I have spent 35 minutes providing critical care, in addition to initial critical time provided by CICU attending Dr. Calixto, re-evaluated multiple times during the day.    Rita Iglesias PA-C

## 2023-12-24 NOTE — ED CDU PROVIDER SUBSEQUENT DAY NOTE - PROGRESS NOTE DETAILS
received phone call from stress lab NP--pt with chest pain 5 min into stress test, +EKG changes in V2/V3, resting images showing apical and inferior wall changes. d/w Dr. Garrison who pt plans on following up with outpt, would call cards fellow for cath consult and admission. received phone call from stress lab NP--pt with chest pain 5 min into stress test, +EKG changes in V2/V3, resting images showing apical and inferior wall changes. d/w Dr. Garrison who pt plans on following up with outpt, would treat pt for unstable angina with heparin and aspirin. will admit to medicine. after admission, pt c/o L sided CP, repeat EKG showing deepening T-wave inversions and ST elevations in V2-V4, changed from yesterday. d/w Dr. Garrison who is in contact with cath attending Dr. Bennett to discuss cath today. recommending 0.4 nitroglycerin, pts /109, pt denies use of viagra/cialis etc. after admission, pt c/o L sided CP, repeat EKG showing deepening T-wave inversions and ST elevations in V2-V4, changed from yesterday. d/w Dr. Garrison who is in contact with cath attending Dr. Bennett to discuss cath today. recommending brilinta 180, 0.4 nitroglycerin, pts /109, pt denies use of viagra/cialis etc. Cards fellow at bedside for cath consult. cardiology activating cath team, plan for urgent cath.

## 2023-12-24 NOTE — CHART NOTE - NSCHARTNOTEFT_GEN_A_CORE
The patient is a 67-year-old male with past medical history of hypertension, hyperlipidemia presenting with chest pain.  Patient reports that he has had intermittent episodes of chest pain over the last 10-14 days.  Chest pain worse with exertion. Patient was admitted to the CDU and had an incomplete stress test with chest pain 5 min into stress test, and EKG changes with elevations in leads V2/V3, resting images indicated apical and inferior wall changes. Patient being followed by Dr. Garrison's group. Post stress test, patient had another episode of chest pain in the CDU, with EKG demonstrating Wellens like pattern in the precordial leads. Chest pain spontaneously resolved. Cath consult placed.     Unstable angina   HTN   HLD     Impression and recommendations:  - Given intermittent chest pain with EKG changes cath lab team activated after discussion with Dr. Bennett   - s/p ASA/Brilinta and heparin loaded in CDU  - Assess for resolution of chest pain and for recurrent chest pain  - Serial EKG to assess for resolution of ST changes  - Monitor vital signs to monitor hemodynamic stability  - Continuous cardiac monitoring to monitor for arrhythmias  - Please obtain serial cardiac enzymes, Troponin to peak and  risk factor profile to include TSH, HGBA1c, Lipid profile, CMP, Mag, Phos, CBC, pBNP. Serial EKG, PRN chest pain  - A1c 5.9  - Cholesterol 224, ,   - Echo 12/24: Left ventricle was not well visualized. Unable to evaluate left ventricular ejection fraction.  - Aspirin 81 PO daily, Brilinta 91mg BID, Lipitor 80 mg PO daily  - Heparin drip   - Post cardiac cath care as per protocol.   - Check radial/groin site for hematoma or bleeding.  - Introduce beta blockers as tolerated  - Low fat DASH diet  - Remainder of cardiology care per Dr. Bladimir Burns MD   Cardiology Fellow     This consult note is preliminary until cosigned by the attending cardiologist.

## 2023-12-24 NOTE — ED ADULT NURSE REASSESSMENT NOTE - GENERAL PATIENT STATE
comfortable appearance/cooperative/resting/sleeping
improvement verbalized/family/SO at bedside/resting/sleeping
active chest  pain/family/SO at bedside

## 2023-12-24 NOTE — PATIENT PROFILE ADULT - FALL HARM RISK - UNIVERSAL INTERVENTIONS
Bed in lowest position, wheels locked, appropriate side rails in place/Call bell, personal items and telephone in reach/Instruct patient to call for assistance before getting out of bed or chair/Non-slip footwear when patient is out of bed/[place] to call system/Physically safe environment - no spills, clutter or unnecessary equipment/Purposeful Proactive Rounding/Room/bathroom lighting operational, light cord in reach Bed in lowest position, wheels locked, appropriate side rails in place/Call bell, personal items and telephone in reach/Instruct patient to call for assistance before getting out of bed or chair/Non-slip footwear when patient is out of bed/[place] to call system/Physically safe environment - no spills, clutter or unnecessary equipment/Purposeful Proactive Rounding/Room/bathroom lighting operational, light cord in reach

## 2023-12-24 NOTE — CHART NOTE - NSCHARTNOTEFT_GEN_A_CORE
CICU Transfer Note    Transfer from: CICU    Transfer to: ( ) Medicine    (X ) Telemetry     ( ) RCU        ( ) Palliative         ( ) Stroke Unit          ( ) __________________    Accepting physician: Dr. Villagomez      [facility] COURSE: 67-year-old male with past medical history of hypertension, hyperlipidemia, anxiety, asthma presenting with intermittent episodes of chest pain over the last 10-14 days. The chest pain is described as "pressure/heaviness" with exertion. Patient initially presented to [facility] on  and had unremarkable EKG with negative trops and was sent home and asked to return with worsening chest pain. On the morning of , his chest pain progressed and patient presented to [facility]. EKG and trops initially negative here as well.  Patient was admitted to the CDU and felt well overnight. On , he had an incomplete stress test with chest pain 5 min into stress test, and EKG changes with elevations in leads V2/V3, resting images indicated apical and inferior wall changes. Patient being followed by Dr. Garrison's group. Post stress test, patient had another episode of chest pain in the CDU, with EKG demonstrating Wellens like pattern in the precordial leads. Chest pain spontaneously resolved. Patient was taken to cath lab and found to have 90% stenosis of mid LAD. Now s/p 1 stent to mLAD and admitted to CICU for monitoring and post-cath care. In CICU, patient hemodynamically stable and tolerating therapy. No longer requiring CICU level of care and stable for transfer to floors.          ASSESSMENT & PLAN:   67-year-old male with past medical history of hypertension, hyperlipidemia, anxiety, asthma presenting with intermittent episodes of chest pain over the last 10-14 days, found to have STEMI and s/p stent to mLAD.    Neuro  -No acute issues  -Neuro checks per routine    Respiratory    #Asthma, chronic  -Home medications include symbicort, albuterol  -Patient denies any shortness of breath currently  -Patient on RA, satting well  -Will CTM    Cardiovascular    #STEMI  -Patient with 1 stent to mLAD  Plan:  -Continue ASA, brilinta  -Continue high-dose statin  -Losartan 50mg qd started  -Introduce further GDMT as tolerated  -Echo in AM. Study on  poor quality  -Check groin site for hematoma or bleeding    #Hyperlipidemia  -Cholesterol: 224, T, HDL: 44, LDL: 133  -Patient taking rosuvastatin 5mg at home  Plan:  -Atorvastatin 80 mg inpatient  -Consider mid to high dose statin on dc    #Hypertension  -Patient takes losartan 50mg at home  -Re-started home losartan  -Will CTM BP    GI/Nutrition  - No acute issues  - DASH diet ordered    /Renal  - No acute issues  - Creatinine of 0.81. Will CTM    Endocrine    #Pre-diabetes  -A1C of 5.9  -Nursing education inpatient and outpatient f/u with PCP    Hematologic/DVT ppx    #DVT ppx  -SQH in AM if patient stable without signs of bleeding    ID  -No fever, leukocytosis  -Will monitor off abx.    For Followup:  []Start DVT ppx with SQH in AM if patient stable without signs of bleeding  []Start GDMT when tolerating  []Echo in AM        Vitals  T(F): 98.2 (23 @ 13:31), Max: 98.2 (23 @ 13:31)  HR: 71 (23 @ 18:00) (68 - 79)  BP: 118/74 (23 @ 18:00) (118/74 - 184/109)  BP(mean): 89 (23 @ 18:00) (89 - 120)  ABP: --  ABP(mean): --  RR: 14 (23 @ 18:00) (11 - 24)  SpO2: 97% (23 @ 18:00) (96% - 99%)  I/O Summary 24H        MEDICATIONS  (STANDING):  atorvastatin 80 milliGRAM(s) Oral at bedtime  losartan 50 milliGRAM(s) Oral daily  ticagrelor 90 milliGRAM(s) Oral every 12 hours    MEDICATIONS  (PRN):        LABS  CBC 23 @ 10:35                        16.0   7.66  )-----------( 263                   48.5       Hgb trend: 16.0 <-- , 14.5 <--   WBC trend: 7.66 <-- , 6.52 <--     CMP 23 @ 10:35    140  |  106  |  18  ----------------------------<  156<H>  4.7   |  24  |  0.81    Mg     2.2     12-    TPro  7.7  /  Alb  4.7  /  TBili  0.7  /  DBili  x   /  AST  38  /  ALT  40  /  AlkPhos  99  12-      Serum Cr trend: 0.81 <-- , 1.10 <--   PT/INR - ( 24 Dec 2023 11:05 )   PT: 11.0 sec;   INR: 1.05 ratio         PTT - ( 24 Dec 2023 11:05 ):31.8 sec CICU Transfer Note    Transfer from: CICU    Transfer to: ( ) Medicine    (X ) Telemetry     ( ) RCU        ( ) Palliative         ( ) Stroke Unit          ( ) __________________    Accepting physician: Dr. Villagomez      [facility] COURSE: 67-year-old male with past medical history of hypertension, hyperlipidemia, anxiety, asthma presenting with intermittent episodes of chest pain over the last 10-14 days. The chest pain is described as "pressure/heaviness" with exertion. Patient initially presented to [facility] on  and had unremarkable EKG with negative trops and was sent home and asked to return with worsening chest pain. On the morning of , his chest pain progressed and patient presented to [facility]. EKG and trops initially negative here as well.  Patient was admitted to the CDU and felt well overnight. On , he had an incomplete stress test with chest pain 5 min into stress test, and EKG changes with elevations in leads V2/V3, resting images indicated apical and inferior wall changes. Patient being followed by Dr. Garrison's group. Post stress test, patient had another episode of chest pain in the CDU, with EKG demonstrating Wellens like pattern in the precordial leads. Chest pain spontaneously resolved. Patient was taken to cath lab and found to have 90% stenosis of mid LAD. Now s/p 1 stent to mLAD and admitted to CICU for monitoring and post-cath care. In CICU, patient hemodynamically stable and tolerating therapy. No longer requiring CICU level of care and stable for transfer to floors.          ASSESSMENT & PLAN:   67-year-old male with past medical history of hypertension, hyperlipidemia, anxiety, asthma presenting with intermittent episodes of chest pain over the last 10-14 days, found to have STEMI and s/p stent to mLAD.    Neuro  -No acute issues  -Neuro checks per routine    Respiratory    #Asthma, chronic  -Home medications include symbicort, albuterol  -Patient denies any shortness of breath currently  -Patient on RA, satting well  -Will CTM    Cardiovascular    #STEMI  -Patient with 1 stent to mLAD  Plan:  -Continue ASA, brilinta  -Continue high-dose statin  -Losartan 50mg qd started  -Introduce further GDMT as tolerated  -Echo in AM. Study on  poor quality  -Check groin site for hematoma or bleeding    #Hyperlipidemia  -Cholesterol: 224, T, HDL: 44, LDL: 133  -Patient taking rosuvastatin 5mg at home  Plan:  -Atorvastatin 80 mg inpatient  -Consider mid to high dose statin on dc    #Hypertension  -Patient takes losartan 50mg at home  -Re-started home losartan  -Will CTM BP    GI/Nutrition  - No acute issues  - DASH diet ordered    /Renal  - No acute issues  - Creatinine of 0.81. Will CTM    Endocrine    #Pre-diabetes  -A1C of 5.9  -Nursing education inpatient and outpatient f/u with PCP    Hematologic/DVT ppx    #DVT ppx  -SQH in AM if patient stable without signs of bleeding    ID  -No fever, leukocytosis  -Will monitor off abx.    For Followup:  []Start DVT ppx with SQH in AM if patient stable without signs of bleeding  []Start GDMT when tolerating  []Echo in AM        Vitals  T(F): 98.2 (23 @ 13:31), Max: 98.2 (23 @ 13:31)  HR: 71 (23 @ 18:00) (68 - 79)  BP: 118/74 (23 @ 18:00) (118/74 - 184/109)  BP(mean): 89 (23 @ 18:00) (89 - 120)  ABP: --  ABP(mean): --  RR: 14 (23 @ 18:00) (11 - 24)  SpO2: 97% (23 @ 18:00) (96% - 99%)  I/O Summary 24H        MEDICATIONS  (STANDING):  atorvastatin 80 milliGRAM(s) Oral at bedtime  losartan 50 milliGRAM(s) Oral daily  ticagrelor 90 milliGRAM(s) Oral every 12 hours    MEDICATIONS  (PRN):        LABS  CBC 23 @ 10:35                        16.0   7.66  )-----------( 263                   48.5       Hgb trend: 16.0 <-- , 14.5 <--   WBC trend: 7.66 <-- , 6.52 <--     CMP 23 @ 10:35    140  |  106  |  18  ----------------------------<  156<H>  4.7   |  24  |  0.81    Mg     2.2     12-    TPro  7.7  /  Alb  4.7  /  TBili  0.7  /  DBili  x   /  AST  38  /  ALT  40  /  AlkPhos  99  12-      Serum Cr trend: 0.81 <-- , 1.10 <--   PT/INR - ( 24 Dec 2023 11:05 )   PT: 11.0 sec;   INR: 1.05 ratio         PTT - ( 24 Dec 2023 11:05 ):31.8 sec

## 2023-12-24 NOTE — PATIENT PROFILE ADULT - NSPRESCRALCFREQ_GEN_A_NUR
Patient seen at [facility] ED on 4-16-19 for Chronic migraine without aura without status migrainosus, not intractable.  ED recommends follow up as needed.   Never

## 2023-12-24 NOTE — ED CDU PROVIDER SUBSEQUENT DAY NOTE - HISTORY
Trevor Mak PA-C: patient seen and evaluated at bedside. resting comfortably. offers no complaints at this time. VSS. will continue to monitor.

## 2023-12-24 NOTE — CHART NOTE - NSCHARTNOTEFT_GEN_A_CORE
CICU Accept Note    CHIEF COMPLAINT: STEMI    HPI / INTERVAL HISTORY: 67-year-old male with past medical history of hypertension, hyperlipidemia, asthma presenting with intermittent episodes of chest pain over the last 10-14 days. The chest  Chest pain worse with exertion. Patient was admitted to the CDU and had an incomplete stress test with chest pain 5 min into stress test, and EKG changes with elevations in leads V2/V3, resting images indicated apical and inferior wall changes. Patient being followed by Dr. Garrison's group. Post stress test, patient had another episode of chest pain in the CDU, with EKG demonstrating Wellens like pattern in the precordial leads. Chest pain spontaneously resolved.    PAST MEDICAL & SURGICAL HISTORY:  Elevated Cholesterol      Prostate cancer      Anxiety      HLD (hyperlipidemia)      Seasonal allergies      Asthma  Dec 2019 last exacerbation; never intubated      Rupture, Tendon, Achilles  with repair right achilles 1986      Arrest of Bone Growth  childhood      H/O knee surgery  left repair of torn patella          FAMILY HISTORY:  FH: testicular cancer  father    FH: congestive heart failure  father    FH: prostate cancer  brother        SOCIAL HISTORY:  Smoking:   Substance Use:   EtOH Use:   Marital Status:   Sexual History:   Occupation:  Recent Travel:  Country of Birth:   Advance Directives:     HOME MEDICATIONS:      Allergies    Percocet 5/325 (Nausea)    Intolerances          REVIEW OF SYSTEMS:  Constitutional: No fevers, chills, weight loss, weight gain  HEENT: No vision problems, eye pain, nasal congestion, rhinorrhea, sore throat, dysphagia  CV: No chest pain, orthopnea, palpitations  Resp: No cough, dyspnea, wheezing, hemoptysis  GI: No nausea, vomiting, diarrhea, constipation, abdominal pain  : [ ] dysuria [ ] nocturia [ ] hematuria [ ] increased urinary frequency  Musculoskeletal: [ ] back pain [ ] myalgias [ ] arthralgias [ ] fracture  Skin: [ ] rash [ ] itch  Neurological: [ ] headache [ ] dizziness [ ] syncope [ ] weakness [ ] numbness  Psychiatric: [ ] anxiety [ ] depression  Endocrine: [ ] diabetes [ ] thyroid problem  Hematologic/Lymphatic: [ ] anemia [ ] bleeding problem  Allergic/Immunologic: [ ] itchy eyes [ ] nasal discharge [ ] hives [ ] angioedema  [ ] All other systems negative  [ ] Unable to assess ROS because ________    OBJECTIVE:  ICU Vital Signs Last 24 Hrs  T(C): 36.8 (24 Dec 2023 13:31), Max: 36.9 (23 Dec 2023 16:09)  T(F): 98.2 (24 Dec 2023 13:31), Max: 98.4 (23 Dec 2023 16:09)  HR: 68 (24 Dec 2023 15:15) (68 - 75)  BP: 141/83 (24 Dec 2023 15:15) (137/82 - 184/109)  BP(mean): 105 (24 Dec 2023 15:15) (102 - 120)  ABP: --  ABP(mean): --  RR: 12 (24 Dec 2023 15:15) (12 - 24)  SpO2: 96% (24 Dec 2023 15:15) (96% - 99%)    O2 Parameters below as of 24 Dec 2023 15:00  Patient On (Oxygen Delivery Method): room air              12-24 @ 07:01  -  12-24 @ 16:02  --------------------------------------------------------  IN: 240 mL / OUT: 0 mL / NET: 240 mL      CAPILLARY BLOOD GLUCOSE          PHYSICAL EXAM:  General:   HEENT:   Neck:   Chest/Lungs:  Heart:  Abdomen:   Extremities:   Skin:   Neuro:   Psych:     LINES:     HOSPITAL MEDICATIONS:  MEDICATIONS  (STANDING):  atorvastatin 80 milliGRAM(s) Oral at bedtime  losartan 50 milliGRAM(s) Oral daily  ticagrelor 90 milliGRAM(s) Oral every 12 hours    MEDICATIONS  (PRN):      LABS:                        16.0   7.66  )-----------( 263      ( 23 Dec 2023 10:35 )             48.5     Hgb Trend: 16.0<--, 14.5<--  12-23    140  |  106  |  18  ----------------------------<  156<H>  4.7   |  24  |  0.81    Ca    9.6      23 Dec 2023 10:35  Mg     2.2     12-22    TPro  7.7  /  Alb  4.7  /  TBili  0.7  /  DBili  x   /  AST  38  /  ALT  40  /  AlkPhos  99  12-23    Creatinine Trend: 0.81<--, 1.10<--  PT/INR - ( 24 Dec 2023 11:05 )   PT: 11.0 sec;   INR: 1.05 ratio         PTT - ( 24 Dec 2023 11:05 )  PTT:31.8 sec  Urinalysis Basic - ( 23 Dec 2023 10:35 )    Color: x / Appearance: x / SG: x / pH: x  Gluc: 156 mg/dL / Ketone: x  / Bili: x / Urobili: x   Blood: x / Protein: x / Nitrite: x   Leuk Esterase: x / RBC: x / WBC x   Sq Epi: x / Non Sq Epi: x / Bacteria: x            MICROBIOLOGY:     RADIOLOGY & ADDITIONAL TESTS:      ASSESSMENT AND PLAN:  Mr./Mrs./Ms. is a ___    #Neuro    #Cardiovascular    #Respiratory    #GI/Nutrition    #/Renal    #Skin    #ID    #Endocrine    #Hematologic/DVT ppx    #Ethics      Kadeem Dickens MD  EM/IM PGY1  Pager: [phone] CICU Accept Note    CHIEF COMPLAINT: STEMI    HPI / INTERVAL HISTORY: 67-year-old male with past medical history of hypertension, hyperlipidemia, asthma presenting with intermittent episodes of chest pain over the last 10-14 days. The chest  Chest pain worse with exertion. Patient was admitted to the CDU and had an incomplete stress test with chest pain 5 min into stress test, and EKG changes with elevations in leads V2/V3, resting images indicated apical and inferior wall changes. Patient being followed by Dr. Garrison's group. Post stress test, patient had another episode of chest pain in the CDU, with EKG demonstrating Wellens like pattern in the precordial leads. Chest pain spontaneously resolved.    PAST MEDICAL & SURGICAL HISTORY:  Elevated Cholesterol      Prostate cancer      Anxiety      HLD (hyperlipidemia)      Seasonal allergies      Asthma  Dec 2019 last exacerbation; never intubated      Rupture, Tendon, Achilles  with repair right achilles 1986      Arrest of Bone Growth  childhood      H/O knee surgery  left repair of torn patella          FAMILY HISTORY:  FH: testicular cancer  father    FH: congestive heart failure  father    FH: prostate cancer  brother        SOCIAL HISTORY:  Smoking:   Substance Use:   EtOH Use:   Marital Status:   Sexual History:   Occupation:  Recent Travel:  Country of Birth:   Advance Directives:     HOME MEDICATIONS:      Allergies    Percocet 5/325 (Nausea)    Intolerances          REVIEW OF SYSTEMS:  Constitutional: No fevers, chills, weight loss, weight gain  HEENT: No vision problems, eye pain, nasal congestion, rhinorrhea, sore throat, dysphagia  CV: No chest pain, orthopnea, palpitations  Resp: No cough, dyspnea, wheezing, hemoptysis  GI: No nausea, vomiting, diarrhea, constipation, abdominal pain  : [ ] dysuria [ ] nocturia [ ] hematuria [ ] increased urinary frequency  Musculoskeletal: [ ] back pain [ ] myalgias [ ] arthralgias [ ] fracture  Skin: [ ] rash [ ] itch  Neurological: [ ] headache [ ] dizziness [ ] syncope [ ] weakness [ ] numbness  Psychiatric: [ ] anxiety [ ] depression  Endocrine: [ ] diabetes [ ] thyroid problem  Hematologic/Lymphatic: [ ] anemia [ ] bleeding problem  Allergic/Immunologic: [ ] itchy eyes [ ] nasal discharge [ ] hives [ ] angioedema  [ ] All other systems negative  [ ] Unable to assess ROS because ________    OBJECTIVE:  ICU Vital Signs Last 24 Hrs  T(C): 36.8 (24 Dec 2023 13:31), Max: 36.9 (23 Dec 2023 16:09)  T(F): 98.2 (24 Dec 2023 13:31), Max: 98.4 (23 Dec 2023 16:09)  HR: 68 (24 Dec 2023 15:15) (68 - 75)  BP: 141/83 (24 Dec 2023 15:15) (137/82 - 184/109)  BP(mean): 105 (24 Dec 2023 15:15) (102 - 120)  ABP: --  ABP(mean): --  RR: 12 (24 Dec 2023 15:15) (12 - 24)  SpO2: 96% (24 Dec 2023 15:15) (96% - 99%)    O2 Parameters below as of 24 Dec 2023 15:00  Patient On (Oxygen Delivery Method): room air              12-24 @ 07:01  -  12-24 @ 16:02  --------------------------------------------------------  IN: 240 mL / OUT: 0 mL / NET: 240 mL      CAPILLARY BLOOD GLUCOSE          PHYSICAL EXAM:  General:   HEENT:   Neck:   Chest/Lungs:  Heart:  Abdomen:   Extremities:   Skin:   Neuro:   Psych:     LINES:     HOSPITAL MEDICATIONS:  MEDICATIONS  (STANDING):  atorvastatin 80 milliGRAM(s) Oral at bedtime  losartan 50 milliGRAM(s) Oral daily  ticagrelor 90 milliGRAM(s) Oral every 12 hours    MEDICATIONS  (PRN):      LABS:                        16.0   7.66  )-----------( 263      ( 23 Dec 2023 10:35 )             48.5     Hgb Trend: 16.0<--, 14.5<--  12-23    140  |  106  |  18  ----------------------------<  156<H>  4.7   |  24  |  0.81    Ca    9.6      23 Dec 2023 10:35  Mg     2.2     12-22    TPro  7.7  /  Alb  4.7  /  TBili  0.7  /  DBili  x   /  AST  38  /  ALT  40  /  AlkPhos  99  12-23    Creatinine Trend: 0.81<--, 1.10<--  PT/INR - ( 24 Dec 2023 11:05 )   PT: 11.0 sec;   INR: 1.05 ratio         PTT - ( 24 Dec 2023 11:05 )  PTT:31.8 sec  Urinalysis Basic - ( 23 Dec 2023 10:35 )    Color: x / Appearance: x / SG: x / pH: x  Gluc: 156 mg/dL / Ketone: x  / Bili: x / Urobili: x   Blood: x / Protein: x / Nitrite: x   Leuk Esterase: x / RBC: x / WBC x   Sq Epi: x / Non Sq Epi: x / Bacteria: x            MICROBIOLOGY:     RADIOLOGY & ADDITIONAL TESTS:      ASSESSMENT AND PLAN:  Mr./Mrs./Ms. is a ___    #Neuro    #Cardiovascular    #Respiratory    #GI/Nutrition    #/Renal    #Skin    #ID    #Endocrine    #Hematologic/DVT ppx    #Ethics      Kadeem Dickens MD  EM/IM PGY1  Pager: [phone] CICU Accept Note    CHIEF COMPLAINT: STEMI    HPI / INTERVAL HISTORY: 67-year-old male with past medical history of hypertension, hyperlipidemia, anxiety, asthma presenting with intermittent episodes of chest pain over the last 10-14 days. The chest pain is described as "pressure/heaviness" with exertion. Patient initially presented to [facility] on  and had unremarkable EKG with negative trops and was sent home and asked to return with worsening chest pain. On the morning of , his chest pain progressed and patient presented to [facility]. EKG and trops initially negative here as well.  Patient was admitted to the CDU and felt well overnight. On , he had an incomplete stress test with chest pain 5 min into stress test, and EKG changes with elevations in leads V2/V3, resting images indicated apical and inferior wall changes. Patient being followed by Dr. Garrison's group. Post stress test, patient had another episode of chest pain in the CDU, with EKG demonstrating Wellens like pattern in the precordial leads. Chest pain spontaneously resolved. Patient was taken to cath lab and found to have 90% stenosis of mid LAD. Now s/p 1 stent to mLAD and admitted to CICU for monitoring and post-cath care.    PAST MEDICAL & SURGICAL HISTORY:  Elevated Cholesterol      Prostate cancer      Anxiety      HLD (hyperlipidemia)      Seasonal allergies      Asthma  Dec 2019 last exacerbation; never intubated      Rupture, Tendon, Achilles  with repair right achilles       Arrest of Bone Growth  childhood      H/O knee surgery  left repair of torn patella          FAMILY HISTORY:  FH: testicular cancer  father    FH: congestive heart failure  father    FH: prostate cancer  brother        SOCIAL HISTORY:  Smoking: Never smoker  Substance Use: Denies  EtOH Use: Occasional  Marital Status:   Sexual History:   Occupation:  Recent Travel:  Country of Birth:   Advance Directives:     HOME MEDICATIONS: losartan 50mg qd, rosuvastatin 5mg qd, symbicort, albuterol      Allergies    Percocet 5/325 (Nausea)    Intolerances          REVIEW OF SYSTEMS:  Constitutional: No fevers, chills, weight loss, weight gain  HEENT: No vision problems, eye pain, nasal congestion, rhinorrhea, sore throat, dysphagia  CV: No chest pain, orthopnea, palpitations  Resp: No cough, dyspnea, wheezing, hemoptysis  GI: No nausea, vomiting, diarrhea, constipation, abdominal pain      OBJECTIVE:  ICU Vital Signs Last 24 Hrs  T(C): 36.8 (24 Dec 2023 13:31), Max: 36.9 (23 Dec 2023 16:09)  T(F): 98.2 (24 Dec 2023 13:31), Max: 98.4 (23 Dec 2023 16:09)  HR: 68 (24 Dec 2023 15:15) (68 - 75)  BP: 141/83 (24 Dec 2023 15:15) (137/82 - 184/109)  BP(mean): 105 (24 Dec 2023 15:15) (102 - 120)  ABP: --  ABP(mean): --  RR: 12 (24 Dec 2023 15:15) (12 - 24)  SpO2: 96% (24 Dec 2023 15:15) (96% - 99%)    O2 Parameters below as of 24 Dec 2023 15:00  Patient On (Oxygen Delivery Method): room air              - @ 07:01  -   @ 16:02  --------------------------------------------------------  IN: 240 mL / OUT: 0 mL / NET: 240 mL      CAPILLARY BLOOD GLUCOSE          PHYSICAL EXAM:  GENERAL: NAD, lying in bed comfortably  HEAD:  Atraumatic, normocephalic  EYES: EOMI, conjunctiva and sclera clear  NECK: Supple, trachea midline, no JVD  HEART: Regular rate and rhythm, no murmurs, rubs, or gallops  LUNGS: Unlabored respirations.  Clear to auscultation bilaterally, no crackles, wheezing, or rhonchi  ABDOMEN: Soft, nontender, nondistended  EXTREMITIES: No LE edema bilaterally  NERVOUS SYSTEM:  A&Ox3, moving all extremities, no focal deficits   SKIN: No rashes or lesions     LINES:     HOSPITAL MEDICATIONS:  MEDICATIONS  (STANDING):  atorvastatin 80 milliGRAM(s) Oral at bedtime  losartan 50 milliGRAM(s) Oral daily  ticagrelor 90 milliGRAM(s) Oral every 12 hours    MEDICATIONS  (PRN):      LABS:                        16.0   7.66  )-----------( 263      ( 23 Dec 2023 10:35 )             48.5     Hgb Trend: 16.0<--, 14.5<--  12-23    140  |  106  |  18  ----------------------------<  156<H>  4.7   |  24  |  0.81    Ca    9.6      23 Dec 2023 10:35  Mg     2.2         TPro  7.7  /  Alb  4.7  /  TBili  0.7  /  DBili  x   /  AST  38  /  ALT  40  /  AlkPhos  99      Creatinine Trend: 0.81<--, 1.10<--  PT/INR - ( 24 Dec 2023 11:05 )   PT: 11.0 sec;   INR: 1.05 ratio         PTT - ( 24 Dec 2023 11:05 )  PTT:31.8 sec  Urinalysis Basic - ( 23 Dec 2023 10:35 )    Color: x / Appearance: x / SG: x / pH: x  Gluc: 156 mg/dL / Ketone: x  / Bili: x / Urobili: x   Blood: x / Protein: x / Nitrite: x   Leuk Esterase: x / RBC: x / WBC x   Sq Epi: x / Non Sq Epi: x / Bacteria: x            MICROBIOLOGY:     RADIOLOGY & ADDITIONAL TESTS:    TTE   CONCLUSIONS:      1. Technically difficult image quality.   2. Left ventricular cavity is normal. Left ventricular wall thickness is normal.   3. Left ventricle was not well visualized.   4. Unable to evaluate left ventricular ejection fraction.   5. There is mild (grade 1) left ventricular diastolic dysfunction.   6. Normal right ventricular cavity size and systolic function.   7. Structurally normal mitral valve with normal leaflet excursion. There is calcification of the mitral valve annulus. There is trace mitral regurgitation.      Stress test   Conclusions:   1. Myocardial Perfusion: Abnormal.   2. Perfusion Findings: Large-sized, severe resting defects in the apex and inferoseptal walls. Decision made not to proceed with stress images.        ASSESSMENT AND PLAN:  67-year-old male with past medical history of hypertension, hyperlipidemia, anxiety, asthma presenting with intermittent episodes of chest pain over the last 10-14 days, found to have STEMI and s/p stent to mLAD.    Neuro  -No acute issues  -Neuro checks per routine    Respiratory    #Asthma, chronic  -Home medications include symbicort, albuterol  -Patient denies any shortness of breath currently  -Patient on RA, satting well  -Will CTM    Cardiovascular    #STEMI  -Patient with 1 stent to mLAD  Plan:  -Continue ASA, brilinta  -Continue high-dose statin  -Losartan 50mg qd started  -Introduce further GDMT as tolerated  -Echo in AM. Study on  poor quality  -Check groin site for hematoma or bleeding    #Hyperlipidemia  -Cholesterol: 224, T, HDL: 44, LDL: 133  -Patient taking rosuvastatin 5mg at home  Plan:  -Atorvastatin 80 mg inpatient  -Consider mid to high dose statin on dc    #Hypertentsion  -Patient takes losartan 50mg at home  -Re-started home losartan  -Will CTM BP    GI/Nutrition  - No acute issues  - DASH diet ordered    /Renal  - No acute issues  - Creatinine of 0.81. Will CTM    Endocrine    #Pre-diabetes  -A1C of 5.9  -Nursing education inpatient and outpatient f/u with PCP    Hematologic/DVT ppx    #DVT ppx  -SQH in AM if patient stable without signs of bleeding    ID  -No fever, leukocytosis  -Will monitor off abx CICU Accept Note    CHIEF COMPLAINT: STEMI    HPI / INTERVAL HISTORY: 67-year-old male with past medical history of hypertension, hyperlipidemia, anxiety, asthma presenting with intermittent episodes of chest pain over the last 10-14 days. The chest pain is described as "pressure/heaviness" with exertion. Patient initially presented to [facility] on  and had unremarkable EKG with negative trops and was sent home and asked to return with worsening chest pain. On the morning of , his chest pain progressed and patient presented to [facility]. EKG and trops initially negative here as well.  Patient was admitted to the CDU and felt well overnight. On , he had an incomplete stress test with chest pain 5 min into stress test, and EKG changes with elevations in leads V2/V3, resting images indicated apical and inferior wall changes. Patient being followed by Dr. Garrison's group. Post stress test, patient had another episode of chest pain in the CDU, with EKG demonstrating Wellens like pattern in the precordial leads. Chest pain spontaneously resolved. Patient was taken to cath lab and found to have 90% stenosis of mid LAD. Now s/p 1 stent to mLAD and admitted to CICU for monitoring and post-cath care.    PAST MEDICAL & SURGICAL HISTORY:  Elevated Cholesterol      Prostate cancer      Anxiety      HLD (hyperlipidemia)      Seasonal allergies      Asthma  Dec 2019 last exacerbation; never intubated      Rupture, Tendon, Achilles  with repair right achilles       Arrest of Bone Growth  childhood      H/O knee surgery  left repair of torn patella          FAMILY HISTORY:  FH: testicular cancer  father    FH: congestive heart failure  father    FH: prostate cancer  brother        SOCIAL HISTORY:  Smoking: Never smoker  Substance Use: Denies  EtOH Use: Occasional  Marital Status:   Sexual History:   Occupation:  Recent Travel:  Country of Birth:   Advance Directives:     HOME MEDICATIONS: losartan 50mg qd, rosuvastatin 5mg qd, symbicort, albuterol      Allergies    Percocet 5/325 (Nausea)    Intolerances          REVIEW OF SYSTEMS:  Constitutional: No fevers, chills, weight loss, weight gain  HEENT: No vision problems, eye pain, nasal congestion, rhinorrhea, sore throat, dysphagia  CV: No chest pain, orthopnea, palpitations  Resp: No cough, dyspnea, wheezing, hemoptysis  GI: No nausea, vomiting, diarrhea, constipation, abdominal pain      OBJECTIVE:  ICU Vital Signs Last 24 Hrs  T(C): 36.8 (24 Dec 2023 13:31), Max: 36.9 (23 Dec 2023 16:09)  T(F): 98.2 (24 Dec 2023 13:31), Max: 98.4 (23 Dec 2023 16:09)  HR: 68 (24 Dec 2023 15:15) (68 - 75)  BP: 141/83 (24 Dec 2023 15:15) (137/82 - 184/109)  BP(mean): 105 (24 Dec 2023 15:15) (102 - 120)  ABP: --  ABP(mean): --  RR: 12 (24 Dec 2023 15:15) (12 - 24)  SpO2: 96% (24 Dec 2023 15:15) (96% - 99%)    O2 Parameters below as of 24 Dec 2023 15:00  Patient On (Oxygen Delivery Method): room air              - @ 07:01  -   @ 16:02  --------------------------------------------------------  IN: 240 mL / OUT: 0 mL / NET: 240 mL      CAPILLARY BLOOD GLUCOSE          PHYSICAL EXAM:  GENERAL: NAD, lying in bed comfortably  HEAD:  Atraumatic, normocephalic  EYES: EOMI, conjunctiva and sclera clear  NECK: Supple, trachea midline, no JVD  HEART: Regular rate and rhythm, no murmurs, rubs, or gallops  LUNGS: Unlabored respirations.  Clear to auscultation bilaterally, no crackles, wheezing, or rhonchi  ABDOMEN: Soft, nontender, nondistended  EXTREMITIES: No LE edema bilaterally  NERVOUS SYSTEM:  A&Ox3, moving all extremities, no focal deficits   SKIN: No rashes or lesions     LINES:     HOSPITAL MEDICATIONS:  MEDICATIONS  (STANDING):  atorvastatin 80 milliGRAM(s) Oral at bedtime  losartan 50 milliGRAM(s) Oral daily  ticagrelor 90 milliGRAM(s) Oral every 12 hours    MEDICATIONS  (PRN):      LABS:                        16.0   7.66  )-----------( 263      ( 23 Dec 2023 10:35 )             48.5     Hgb Trend: 16.0<--, 14.5<--  12-23    140  |  106  |  18  ----------------------------<  156<H>  4.7   |  24  |  0.81    Ca    9.6      23 Dec 2023 10:35  Mg     2.2         TPro  7.7  /  Alb  4.7  /  TBili  0.7  /  DBili  x   /  AST  38  /  ALT  40  /  AlkPhos  99      Creatinine Trend: 0.81<--, 1.10<--  PT/INR - ( 24 Dec 2023 11:05 )   PT: 11.0 sec;   INR: 1.05 ratio         PTT - ( 24 Dec 2023 11:05 )  PTT:31.8 sec  Urinalysis Basic - ( 23 Dec 2023 10:35 )    Color: x / Appearance: x / SG: x / pH: x  Gluc: 156 mg/dL / Ketone: x  / Bili: x / Urobili: x   Blood: x / Protein: x / Nitrite: x   Leuk Esterase: x / RBC: x / WBC x   Sq Epi: x / Non Sq Epi: x / Bacteria: x            MICROBIOLOGY:     RADIOLOGY & ADDITIONAL TESTS:    TTE   CONCLUSIONS:      1. Technically difficult image quality.   2. Left ventricular cavity is normal. Left ventricular wall thickness is normal.   3. Left ventricle was not well visualized.   4. Unable to evaluate left ventricular ejection fraction.   5. There is mild (grade 1) left ventricular diastolic dysfunction.   6. Normal right ventricular cavity size and systolic function.   7. Structurally normal mitral valve with normal leaflet excursion. There is calcification of the mitral valve annulus. There is trace mitral regurgitation.      Stress test   Conclusions:   1. Myocardial Perfusion: Abnormal.   2. Perfusion Findings: Large-sized, severe resting defects in the apex and inferoseptal walls. Decision made not to proceed with stress images.        ASSESSMENT AND PLAN:  67-year-old male with past medical history of hypertension, hyperlipidemia, anxiety, asthma presenting with intermittent episodes of chest pain over the last 10-14 days, found to have STEMI and s/p stent to mLAD.    Neuro  -No acute issues  -Neuro checks per routine    Respiratory    #Asthma, chronic  -Home medications include symbicort, albuterol  -Patient denies any shortness of breath currently  -Patient on RA, satting well  -Will CTM    Cardiovascular    #STEMI  -Patient with 1 stent to mLAD  Plan:  -Continue ASA, brilinta  -Continue high-dose statin  -Losartan 50mg qd started  -Introduce further GDMT as tolerated  -Echo in AM. Study on  poor quality  -Check groin site for hematoma or bleeding    #Hyperlipidemia  -Cholesterol: 224, T, HDL: 44, LDL: 133  -Patient taking rosuvastatin 5mg at home  Plan:  -Atorvastatin 80 mg inpatient  -Consider mid to high dose statin on dc    #Hypertentsion  -Patient takes losartan 50mg at home  -Re-started home losartan  -Will CTM BP    GI/Nutrition  - No acute issues  - DASH diet ordered    /Renal  - No acute issues  - Creatinine of 0.81. Will CTM    Endocrine    #Pre-diabetes  -A1C of 5.9  -Nursing education inpatient and outpatient f/u with PCP    Hematologic/DVT ppx    #DVT ppx  -SQH in AM if patient stable without signs of bleeding    ID  -No fever, leukocytosis  -Will monitor off abx

## 2023-12-24 NOTE — ED ADULT NURSE REASSESSMENT NOTE - COMFORT CARE
darkened lights/plan of care explained/side rails up/warm blanket provided
meal provided/plan of care explained/side rails up/treatment delay explained/wait time explained/warm blanket provided
darkened lights/meal provided/plan of care explained/side rails up/treatment delay explained/wait time explained/warm blanket provided

## 2023-12-24 NOTE — PATIENT PROFILE ADULT - FUNCTIONAL ASSESSMENT - BASIC MOBILITY 6.
4-calculated by average/Not able to assess (calculate score using [facility] averaging method)  4-calculated by average/Not able to assess (calculate score using [facility] averaging method)

## 2023-12-25 DIAGNOSIS — E78.5 HYPERLIPIDEMIA, UNSPECIFIED: ICD-10-CM

## 2023-12-25 DIAGNOSIS — Z29.9 ENCOUNTER FOR PROPHYLACTIC MEASURES, UNSPECIFIED: ICD-10-CM

## 2023-12-25 DIAGNOSIS — I21.3 ST ELEVATION (STEMI) MYOCARDIAL INFARCTION OF UNSPECIFIED SITE: ICD-10-CM

## 2023-12-25 DIAGNOSIS — I10 ESSENTIAL (PRIMARY) HYPERTENSION: ICD-10-CM

## 2023-12-25 PROCEDURE — 99232 SBSQ HOSP IP/OBS MODERATE 35: CPT

## 2023-12-25 PROCEDURE — 99223 1ST HOSP IP/OBS HIGH 75: CPT

## 2023-12-25 RX ORDER — METOPROLOL TARTRATE 50 MG
25 TABLET ORAL DAILY
Refills: 0 | Status: DISCONTINUED | OUTPATIENT
Start: 2023-12-25 | End: 2023-12-26

## 2023-12-25 RX ORDER — ACETAMINOPHEN 500 MG
650 TABLET ORAL EVERY 6 HOURS
Refills: 0 | Status: DISCONTINUED | OUTPATIENT
Start: 2023-12-25 | End: 2023-12-26

## 2023-12-25 RX ADMIN — TICAGRELOR 90 MILLIGRAM(S): 90 TABLET ORAL at 06:06

## 2023-12-25 RX ADMIN — Medication 650 MILLIGRAM(S): at 12:40

## 2023-12-25 RX ADMIN — LOSARTAN POTASSIUM 50 MILLIGRAM(S): 100 TABLET, FILM COATED ORAL at 06:06

## 2023-12-25 RX ADMIN — ATORVASTATIN CALCIUM 80 MILLIGRAM(S): 80 TABLET, FILM COATED ORAL at 21:15

## 2023-12-25 RX ADMIN — Medication 81 MILLIGRAM(S): at 18:11

## 2023-12-25 RX ADMIN — Medication 25 MILLIGRAM(S): at 09:55

## 2023-12-25 RX ADMIN — Medication 650 MILLIGRAM(S): at 11:40

## 2023-12-25 RX ADMIN — TICAGRELOR 90 MILLIGRAM(S): 90 TABLET ORAL at 18:11

## 2023-12-25 NOTE — PROGRESS NOTE ADULT - ASSESSMENT
67-year-old male with past medical history of hypertension, hyperlipidemia, anxiety, asthma presenting with intermittent episodes of chest pain over the last 10-14 days, found to have STEMI and s/p stent to mLAD.      #STEMI  -Patient with ANAYELI to mLAD x1   - Also with D1 and D2= 40 % stenosis and pRCA 80 %. Left  posterolateral descending artery: shows severe  atherosclerosis.    -Continue ASA, brilinta  -Continue high-dose statin  -Losartan 50mg qd   -Introduce further GDMT as tolerated  -repeat TTE,  Study on 12/24 poor quality  -Check groin site for hematoma or bleeding  -Please check CBC and BMP today  -Will need to discuss with interventional if there is a plan for any additional procedures.     Ananda Copeland, Cardiology Fellow, F-2    For all New Consults and Questions:  [email or web address]   Login: [email or web address]    *** Note not final until signed by attending   67-year-old male with past medical history of hypertension, hyperlipidemia, anxiety, asthma presenting with intermittent episodes of chest pain over the last 10-14 days, found to have STEMI and s/p stent to mLAD.      #STEMI  -Patient with ANAYELI to mLAD x1   - Also with D1 and D2= 40 % stenosis and pRCA 80 %. Left  posterolateral descending artery: shows severe  atherosclerosis.    -Continue ASA, brilinta  -Continue high-dose statin  -Losartan 50mg qd   -Introduce further GDMT as tolerated  -repeat TTE,  Study on 12/24 poor quality  -Check groin site for hematoma or bleeding  -Please check CBC and BMP today  -Will need to discuss with interventional if there is a plan for any additional procedures.     Ananda Copeland, Cardiology Fellow, F-2    For all New Consults and Questions:  [email or web address]   Login: [email or web address]    *** Note not final until signed by attending

## 2023-12-25 NOTE — PROGRESS NOTE ADULT - SUBJECTIVE AND OBJECTIVE BOX
Faye Banegas MD  Division of Hospital Medicine  Please contact via MS Teams (prefer message first)  Office: [phone]    Patient is a 67y old  Male who presents with a chief complaint of STEMI (24 Dec 2023 21:04)      SUBJECTIVE / OVERNIGHT EVENTS:  no acute events overnight, vss, afebrile  pt feels his breathing has much improved  has no other complaints    ROS:  14 point ROS negative in detail except stated as above    MEDICATIONS  (STANDING):  aspirin enteric coated 81 milliGRAM(s) Oral daily  atorvastatin 80 milliGRAM(s) Oral at bedtime  losartan 50 milliGRAM(s) Oral daily  metoprolol succinate ER 25 milliGRAM(s) Oral daily  ticagrelor 90 milliGRAM(s) Oral every 12 hours    MEDICATIONS  (PRN):  acetaminophen     Tablet .. 650 milliGRAM(s) Oral every 6 hours PRN Mild Pain (1 - 3)      CAPILLARY BLOOD GLUCOSE        I&O's Summary    24 Dec 2023 07:01  -  25 Dec 2023 07:00  --------------------------------------------------------  IN: 600 mL / OUT: 1150 mL / NET: -550 mL        PHYSICAL EXAM:  Vital Signs Last 24 Hrs  T(C): 36.9 (25 Dec 2023 04:00), Max: 37 (24 Dec 2023 20:00)  T(F): 98.5 (25 Dec 2023 04:00), Max: 98.6 (24 Dec 2023 20:00)  HR: 72 (25 Dec 2023 04:00) (67 - 79)  BP: 116/71 (25 Dec 2023 04:00) (110/79 - 167/99)  BP(mean): 124 (24 Dec 2023 23:00) (89 - 124)  RR: 18 (25 Dec 2023 04:00) (11 - 24)  SpO2: 97% (25 Dec 2023 04:00) (96% - 100%)    Parameters below as of 25 Dec 2023 04:00  Patient On (Oxygen Delivery Method): room air      GENERAL: NAD, well-developed  HEAD:  Atraumatic, Normocephalic  EYES: EOMI, PERRLA, conjunctiva and sclera clear  NECK: Supple, No JVD  CHEST/LUNG: Clear to auscultation bilaterally; No wheeze  HEART: Regular rate and rhythm; No murmurs, rubs, or gallops  ABDOMEN: Soft, Nontender, Nondistended; Bowel sounds present  EXTREMITIES:  2+ Peripheral Pulses, No clubbing, cyanosis, or edema  NEUROLOGY: AAOx3; non-focal  SKIN: No rashes or lesions    LABS:          PT/INR - ( 24 Dec 2023 11:05 )   PT: 11.0 sec;   INR: 1.05 ratio         PTT - ( 24 Dec 2023 11:05 )  PTT:31.8 sec          RADIOLOGY & ADDITIONAL TESTS:    Imaging Personally Reviewed:    Consultant(s) Notes Reviewed:  cards    Care Discussed with Consultants/Other Providers:   Faye Banegas MD  Division of Hospital Medicine  Please contact via MS Teams (prefer message first)  Office: [phone]    Patient is a 67y old  Male who presents with a chief complaint of STEMI (24 Dec 2023 21:04)      SUBJECTIVE / OVERNIGHT EVENTS:  no acute events overnight, vss, afebrile  pt feels his breathing has much improved  has no other complaints    ROS:  14 point ROS negative in detail except stated as above    MEDICATIONS  (STANDING):  aspirin enteric coated 81 milliGRAM(s) Oral daily  atorvastatin 80 milliGRAM(s) Oral at bedtime  losartan 50 milliGRAM(s) Oral daily  metoprolol succinate ER 25 milliGRAM(s) Oral daily  ticagrelor 90 milliGRAM(s) Oral every 12 hours    MEDICATIONS  (PRN):  acetaminophen     Tablet .. 650 milliGRAM(s) Oral every 6 hours PRN Mild Pain (1 - 3)      CAPILLARY BLOOD GLUCOSE        I&O's Summary    24 Dec 2023 07:01  -  25 Dec 2023 07:00  --------------------------------------------------------  IN: 600 mL / OUT: 1150 mL / NET: -550 mL        PHYSICAL EXAM:  Vital Signs Last 24 Hrs  T(C): 36.9 (25 Dec 2023 04:00), Max: 37 (24 Dec 2023 20:00)  T(F): 98.5 (25 Dec 2023 04:00), Max: 98.6 (24 Dec 2023 20:00)  HR: 72 (25 Dec 2023 04:00) (67 - 79)  BP: 116/71 (25 Dec 2023 04:00) (110/79 - 167/99)  BP(mean): 124 (24 Dec 2023 23:00) (89 - 124)  RR: 18 (25 Dec 2023 04:00) (11 - 24)  SpO2: 97% (25 Dec 2023 04:00) (96% - 100%)    Parameters below as of 25 Dec 2023 04:00  Patient On (Oxygen Delivery Method): room air      GENERAL: NAD, well-developed  HEAD:  Atraumatic, Normocephalic  EYES: EOMI, PERRLA, conjunctiva and sclera clear  NECK: Supple, No JVD  CHEST/LUNG: Clear to auscultation bilaterally; No wheeze  HEART: Regular rate and rhythm; No murmurs, rubs, or gallops  ABDOMEN: Soft, Nontender, Nondistended; Bowel sounds present  EXTREMITIES:  2+ Peripheral Pulses, No clubbing, cyanosis, or edema  NEUROLOGY: AAOx3; non-focal  SKIN: No rashes or lesions    LABS:          PT/INR - ( 24 Dec 2023 11:05 )   PT: 11.0 sec;   INR: 1.05 ratio         PTT - ( 24 Dec 2023 11:05 )  PTT:31.8 sec          RADIOLOGY & ADDITIONAL TESTS:    Imaging Personally Reviewed:    Consultant(s) Notes Reviewed:  cards    Care Discussed with Consultants/Other Providers:

## 2023-12-25 NOTE — PROGRESS NOTE ADULT - SUBJECTIVE AND OBJECTIVE BOX
Patient seen and examined at bedside.    Overnight Events: No acute events overnight.       REVIEW OF SYSTEMS:  Constitutional:     [ x] negative [ ] fevers [ ] chills [ ] weight loss [ ] weight gain  HEENT:                  [ x] negative [ ] dry eyes [ ] eye irritation [ ] postnasal drip [ ] nasal congestion  CV:                         [x ] negative  [ ] chest pain [ ] orthopnea [ ] palpitations [ ] murmur  Resp:                     [ x] negative [ ] cough [ ] shortness of breath [ ] dyspnea [ ] wheezing [ ] sputum [ ]hemoptysis  GI:                          [ x] negative [ ] nausea [ ] vomiting [ ] diarrhea [ ] constipation [ ] abd pain [ ] dysphagia   :                        [ x] negative [ ] dysuria [ ] nocturia [ ] hematuria [ ] increased urinary frequency  Musculoskeletal: [ x] negative [ ] back pain [ ] myalgias [ ] arthralgias [ ] fracture  Skin:                       [ x] negative [ ] rash [ ] itch  Neurological:        [ x] negative [ ] headache [ ] dizziness [ ] syncope [ ] weakness [ ] numbness  Psychiatric:           [ x] negative [ ] anxiety [ ] depression  Endocrine:            [ x] negative [ ] diabetes [ ] thyroid problem  Heme/Lymph:      [ x] negative [ ] anemia [ ] bleeding problem  Allergic/Immune: [x ] negative [ ] itchy eyes [ ] nasal discharge [ ] hives [ ] angioedema    [x ] All other systems negative  [ ] Unable to assess ROS due to    Current Meds:  aspirin enteric coated 81 milliGRAM(s) Oral daily  atorvastatin 80 milliGRAM(s) Oral at bedtime  losartan 50 milliGRAM(s) Oral daily  ticagrelor 90 milliGRAM(s) Oral every 12 hours      PAST MEDICAL & SURGICAL HISTORY:  Elevated Cholesterol      Prostate cancer      Anxiety      HLD (hyperlipidemia)      Seasonal allergies      Asthma  Dec 2019 last exacerbation; never intubated      Rupture, Tendon, Achilles  with repair right achilles 1986      Arrest of Bone Growth  childhood      H/O knee surgery  left repair of torn patella          Vitals:  T(F): 98.5 (12-25), Max: 98.6 (12-24)  HR: 72 (12-25) (67 - 79)  BP: 116/71 (12-25) (110/79 - 167/99)  RR: 18 (12-25)  SpO2: 97% (12-25)  I&O's Summary    24 Dec 2023 07:01  -  25 Dec 2023 07:00  --------------------------------------------------------  IN: 600 mL / OUT: 1150 mL / NET: -550 mL        Physical Exam:  GENERAL: NAD, lying in bed comfortably  HEAD:  Atraumatic, normocephalic  EYES: EOMI, conjunctiva and sclera clear  NECK: Supple, trachea midline, no JVD  HEART: Regular rate and rhythm, no murmurs, rubs, or gallops  LUNGS: Unlabored respirations.  Clear to auscultation bilaterally, no crackles, wheezing, or rhonchi  ABDOMEN: Soft, nontender, nondistended  EXTREMITIES: No LE edema bilaterally  NERVOUS SYSTEM:  A&Ox3, moving all extremities, no focal deficits   SKIN: No rashes or lesions                           16.0   7.66  )-----------( 263      ( 23 Dec 2023 10:35 )             48.5     12-23    140  |  106  |  18  ----------------------------<  156<H>  4.7   |  24  |  0.81    Ca    9.6      23 Dec 2023 10:35    TPro  7.7  /  Alb  4.7  /  TBili  0.7  /  DBili  x   /  AST  38  /  ALT  40  /  AlkPhos  99  12-23    PT/INR - ( 24 Dec 2023 11:05 )   PT: 11.0 sec;   INR: 1.05 ratio         PTT - ( 24 Dec 2023 11:05 )  PTT:31.8 sec

## 2023-12-25 NOTE — PROGRESS NOTE ADULT - PROBLEM SELECTOR PLAN 1
LHC s/p ANAYELI to mLAD x 1  - continue asa, brilnta, high-intensity statin  - continue losartan 50mg daily  - start Toprol XL 25mg daily  - repeat TTE  - continue to introduce GDMTs  - appreciate card recs

## 2023-12-25 NOTE — PROGRESS NOTE ADULT - ATTENDING COMMENTS
Agree with plan as outlined above.  Pleasant gentleman s/p mLAD PCI after [facility] EKG; Also with resting apical/inferior defects;   Check Echo   uncertain if plan will be to do staged of RCA here   F/u Dr. Garrison tomorrow whether inpatient or outpatient RCA evaluation/intervention                                                                                             Eighty minutes spent in direct patient care and communication

## 2023-12-25 NOTE — PROGRESS NOTE ADULT - ASSESSMENT
67M with hx of HTN, HLD, anxiety, asthma presents with intermittent chest pain x 2 weeks found to have STEMI s/p ANAYELI to mLAD

## 2023-12-26 ENCOUNTER — TRANSCRIPTION ENCOUNTER (OUTPATIENT)
Age: 68
End: 2023-12-26

## 2023-12-26 VITALS
HEART RATE: 71 BPM | SYSTOLIC BLOOD PRESSURE: 175 MMHG | TEMPERATURE: 98 F | DIASTOLIC BLOOD PRESSURE: 78 MMHG | RESPIRATION RATE: 18 BRPM | OXYGEN SATURATION: 95 %

## 2023-12-26 LAB
ANION GAP SERPL CALC-SCNC: 16 MMOL/L — SIGNIFICANT CHANGE UP (ref 5–17)
ANION GAP SERPL CALC-SCNC: 16 MMOL/L — SIGNIFICANT CHANGE UP (ref 5–17)
BUN SERPL-MCNC: 24 MG/DL — HIGH (ref 7–23)
BUN SERPL-MCNC: 24 MG/DL — HIGH (ref 7–23)
CALCIUM SERPL-MCNC: 9.7 MG/DL — SIGNIFICANT CHANGE UP (ref 8.4–10.5)
CALCIUM SERPL-MCNC: 9.7 MG/DL — SIGNIFICANT CHANGE UP (ref 8.4–10.5)
CHLORIDE SERPL-SCNC: 108 MMOL/L — SIGNIFICANT CHANGE UP (ref 96–108)
CHLORIDE SERPL-SCNC: 108 MMOL/L — SIGNIFICANT CHANGE UP (ref 96–108)
CO2 SERPL-SCNC: 19 MMOL/L — LOW (ref 22–31)
CO2 SERPL-SCNC: 19 MMOL/L — LOW (ref 22–31)
CREAT SERPL-MCNC: 0.95 MG/DL — SIGNIFICANT CHANGE UP (ref 0.5–1.3)
CREAT SERPL-MCNC: 0.95 MG/DL — SIGNIFICANT CHANGE UP (ref 0.5–1.3)
EGFR: 87 ML/MIN/1.73M2 — SIGNIFICANT CHANGE UP
EGFR: 87 ML/MIN/1.73M2 — SIGNIFICANT CHANGE UP
GLUCOSE SERPL-MCNC: 114 MG/DL — HIGH (ref 70–99)
GLUCOSE SERPL-MCNC: 114 MG/DL — HIGH (ref 70–99)
HCT VFR BLD CALC: 43.4 % — SIGNIFICANT CHANGE UP (ref 39–50)
HCT VFR BLD CALC: 43.4 % — SIGNIFICANT CHANGE UP (ref 39–50)
HGB BLD-MCNC: 14.4 G/DL — SIGNIFICANT CHANGE UP (ref 13–17)
HGB BLD-MCNC: 14.4 G/DL — SIGNIFICANT CHANGE UP (ref 13–17)
MCHC RBC-ENTMCNC: 29.6 PG — SIGNIFICANT CHANGE UP (ref 27–34)
MCHC RBC-ENTMCNC: 29.6 PG — SIGNIFICANT CHANGE UP (ref 27–34)
MCHC RBC-ENTMCNC: 33.2 GM/DL — SIGNIFICANT CHANGE UP (ref 32–36)
MCHC RBC-ENTMCNC: 33.2 GM/DL — SIGNIFICANT CHANGE UP (ref 32–36)
MCV RBC AUTO: 89.3 FL — SIGNIFICANT CHANGE UP (ref 80–100)
MCV RBC AUTO: 89.3 FL — SIGNIFICANT CHANGE UP (ref 80–100)
NRBC # BLD: 0 /100 WBCS — SIGNIFICANT CHANGE UP (ref 0–0)
NRBC # BLD: 0 /100 WBCS — SIGNIFICANT CHANGE UP (ref 0–0)
PLATELET # BLD AUTO: 246 K/UL — SIGNIFICANT CHANGE UP (ref 150–400)
PLATELET # BLD AUTO: 246 K/UL — SIGNIFICANT CHANGE UP (ref 150–400)
POTASSIUM SERPL-MCNC: 4.1 MMOL/L — SIGNIFICANT CHANGE UP (ref 3.5–5.3)
POTASSIUM SERPL-MCNC: 4.1 MMOL/L — SIGNIFICANT CHANGE UP (ref 3.5–5.3)
POTASSIUM SERPL-SCNC: 4.1 MMOL/L — SIGNIFICANT CHANGE UP (ref 3.5–5.3)
POTASSIUM SERPL-SCNC: 4.1 MMOL/L — SIGNIFICANT CHANGE UP (ref 3.5–5.3)
RBC # BLD: 4.86 M/UL — SIGNIFICANT CHANGE UP (ref 4.2–5.8)
RBC # BLD: 4.86 M/UL — SIGNIFICANT CHANGE UP (ref 4.2–5.8)
RBC # FLD: 13.2 % — SIGNIFICANT CHANGE UP (ref 10.3–14.5)
RBC # FLD: 13.2 % — SIGNIFICANT CHANGE UP (ref 10.3–14.5)
SODIUM SERPL-SCNC: 143 MMOL/L — SIGNIFICANT CHANGE UP (ref 135–145)
SODIUM SERPL-SCNC: 143 MMOL/L — SIGNIFICANT CHANGE UP (ref 135–145)
WBC # BLD: 10.5 K/UL — SIGNIFICANT CHANGE UP (ref 3.8–10.5)
WBC # BLD: 10.5 K/UL — SIGNIFICANT CHANGE UP (ref 3.8–10.5)
WBC # FLD AUTO: 10.5 K/UL — SIGNIFICANT CHANGE UP (ref 3.8–10.5)
WBC # FLD AUTO: 10.5 K/UL — SIGNIFICANT CHANGE UP (ref 3.8–10.5)

## 2023-12-26 PROCEDURE — 87637 SARSCOV2&INF A&B&RSV AMP PRB: CPT

## 2023-12-26 PROCEDURE — 84484 ASSAY OF TROPONIN QUANT: CPT

## 2023-12-26 PROCEDURE — A9500: CPT

## 2023-12-26 PROCEDURE — 99233 SBSQ HOSP IP/OBS HIGH 50: CPT

## 2023-12-26 PROCEDURE — 85027 COMPLETE CBC AUTOMATED: CPT

## 2023-12-26 PROCEDURE — 36415 COLL VENOUS BLD VENIPUNCTURE: CPT

## 2023-12-26 PROCEDURE — 93321 DOPPLER ECHO F-UP/LMTD STD: CPT

## 2023-12-26 PROCEDURE — 71045 X-RAY EXAM CHEST 1 VIEW: CPT

## 2023-12-26 PROCEDURE — 93308 TTE F-UP OR LMTD: CPT | Mod: 26

## 2023-12-26 PROCEDURE — 80053 COMPREHEN METABOLIC PANEL: CPT

## 2023-12-26 PROCEDURE — 93321 DOPPLER ECHO F-UP/LMTD STD: CPT | Mod: 26

## 2023-12-26 PROCEDURE — C1769: CPT

## 2023-12-26 PROCEDURE — C9600: CPT | Mod: LD

## 2023-12-26 PROCEDURE — 85025 COMPLETE CBC W/AUTO DIFF WBC: CPT

## 2023-12-26 PROCEDURE — C1887: CPT

## 2023-12-26 PROCEDURE — C1874: CPT

## 2023-12-26 PROCEDURE — 99285 EMERGENCY DEPT VISIT HI MDM: CPT

## 2023-12-26 PROCEDURE — C1725: CPT

## 2023-12-26 PROCEDURE — 85610 PROTHROMBIN TIME: CPT

## 2023-12-26 PROCEDURE — 80061 LIPID PANEL: CPT

## 2023-12-26 PROCEDURE — 83036 HEMOGLOBIN GLYCOSYLATED A1C: CPT

## 2023-12-26 PROCEDURE — C8924: CPT

## 2023-12-26 PROCEDURE — C1760: CPT

## 2023-12-26 PROCEDURE — 99239 HOSP IP/OBS DSCHRG MGMT >30: CPT

## 2023-12-26 PROCEDURE — 93306 TTE W/DOPPLER COMPLETE: CPT

## 2023-12-26 PROCEDURE — 80048 BASIC METABOLIC PNL TOTAL CA: CPT

## 2023-12-26 PROCEDURE — 93005 ELECTROCARDIOGRAM TRACING: CPT

## 2023-12-26 PROCEDURE — 93458 L HRT ARTERY/VENTRICLE ANGIO: CPT | Mod: 59

## 2023-12-26 PROCEDURE — 85730 THROMBOPLASTIN TIME PARTIAL: CPT

## 2023-12-26 PROCEDURE — 93017 CV STRESS TEST TRACING ONLY: CPT

## 2023-12-26 PROCEDURE — 78451 HT MUSCLE IMAGE SPECT SING: CPT | Mod: MA

## 2023-12-26 PROCEDURE — 99232 SBSQ HOSP IP/OBS MODERATE 35: CPT

## 2023-12-26 RX ORDER — CLOPIDOGREL BISULFATE 75 MG/1
600 TABLET, FILM COATED ORAL ONCE
Refills: 0 | Status: COMPLETED | OUTPATIENT
Start: 2023-12-26 | End: 2023-12-26

## 2023-12-26 RX ORDER — ATORVASTATIN CALCIUM 80 MG/1
1 TABLET, FILM COATED ORAL
Qty: 30 | Refills: 0
Start: 2023-12-26 | End: 2024-01-24

## 2023-12-26 RX ORDER — ACETAMINOPHEN 500 MG
2 TABLET ORAL
Qty: 0 | Refills: 0 | DISCHARGE
Start: 2023-12-26

## 2023-12-26 RX ORDER — CLOPIDOGREL BISULFATE 75 MG/1
1 TABLET, FILM COATED ORAL
Qty: 30 | Refills: 0
Start: 2023-12-26 | End: 2024-01-24

## 2023-12-26 RX ORDER — CLOPIDOGREL BISULFATE 75 MG/1
75 TABLET, FILM COATED ORAL DAILY
Refills: 0 | Status: DISCONTINUED | OUTPATIENT
Start: 2023-12-27 | End: 2023-12-26

## 2023-12-26 RX ORDER — LOSARTAN POTASSIUM 100 MG/1
1 TABLET, FILM COATED ORAL
Qty: 30 | Refills: 0
Start: 2023-12-26 | End: 2024-01-24

## 2023-12-26 RX ORDER — TICAGRELOR 90 MG/1
1 TABLET ORAL
Qty: 60 | Refills: 0
Start: 2023-12-26 | End: 2024-01-24

## 2023-12-26 RX ORDER — ASPIRIN/CALCIUM CARB/MAGNESIUM 324 MG
1 TABLET ORAL
Qty: 30 | Refills: 0
Start: 2023-12-26 | End: 2024-01-24

## 2023-12-26 RX ORDER — ACETAMINOPHEN 500 MG
2 TABLET ORAL
Qty: 0 | Refills: 0 | DISCHARGE

## 2023-12-26 RX ADMIN — LOSARTAN POTASSIUM 50 MILLIGRAM(S): 100 TABLET, FILM COATED ORAL at 06:15

## 2023-12-26 RX ADMIN — TICAGRELOR 90 MILLIGRAM(S): 90 TABLET ORAL at 06:15

## 2023-12-26 RX ADMIN — CLOPIDOGREL BISULFATE 600 MILLIGRAM(S): 75 TABLET, FILM COATED ORAL at 11:04

## 2023-12-26 RX ADMIN — Medication 81 MILLIGRAM(S): at 11:04

## 2023-12-26 RX ADMIN — Medication 25 MILLIGRAM(S): at 06:16

## 2023-12-26 RX ADMIN — Medication 650 MILLIGRAM(S): at 06:18

## 2023-12-26 NOTE — PROGRESS NOTE ADULT - SUBJECTIVE AND OBJECTIVE BOX
Interventional Cardiology Post Cath Progress Note      Patient is a 67y old  Male who presents with a chief complaint of chest pain (25 Dec 2023 11:04)        Pt was seen at bedside, denies pain, SOB, reporting "feels much better."    Tele: SR @ 70's     Allergies: Percocet 5/325 (Nausea)    Intolerances      Medications:  acetaminophen     Tablet .. 650 milliGRAM(s) Oral every 6 hours PRN  aspirin enteric coated 81 milliGRAM(s) Oral daily  atorvastatin 80 milliGRAM(s) Oral at bedtime  losartan 50 milliGRAM(s) Oral daily  metoprolol succinate ER 25 milliGRAM(s) Oral daily  ticagrelor 90 milliGRAM(s) Oral every 12 hours      Vitals:  T(C): 36.7 (23 @ 04:20), Max: 36.9 (23 @ 20:50)  HR: 75 (23 @ 04:20) (71 - 75)  BP: 124/74 (23 @ 04:20) (124/74 - 143/87)  BP(mean): --  RR: 18 (23 @ 04:20) (18 - 18)  SpO2: 95% (23 @ 04:20) (95% - 97%)  Wt(kg): --  Daily     Daily   I&O's Summary    25 Dec 2023 07:01  -  26 Dec 2023 07:00  --------------------------------------------------------  IN: 280 mL / OUT: 0 mL / NET: 280 mL          Review of System:   Denies chest pain, SOB, dizziness or palpitation  All other systems: no other complaints offered         Physical Exam:  Appearance: Normal  Eyes: PERRL, EOMI  HENT: Normal oral muscosa, NC/AT  Cardiovascular: S1S2, RRR, No M/R/G, no JVD, No Lower extremity edema  Procedural RFA Access Site: No hematoma, Non-tender to palpation, 2+ pulse, No bruit, No Ecchymosis  Respiratory: Clear to auscultation bilaterally  Gastrointestinal: Soft, Non tender, Normal Bowel Sounds  Musculoskeletal: No clubbing, No joint deformity   Neurologic: Non-focal  Lymphatic: No lymphadenopathy  Psychiatry: AAOx3, Mood & affect appropriate  Skin: No rashes, No ecchymoses, No cyanosis    PT/INR - ( 24 Dec 2023 11:05 )   PT: 11.0 sec;   INR: 1.05 ratio         PTT - ( 24 Dec 2023 11:05 )  PTT:31.8 sec    Lipid panel Total Cholesterol: 224  LDL: --  HDL: 44  T      Hgb A1c A1C with Estimated Average Glucose Result: 5.9 % ( @ 19:53)    ECG:    Echo:  < from: TTE W or WO Ultrasound Enhancing Agent (23 @ 07:54) >   1. Technically difficult image quality.   2. Left ventricular cavity is normal. Left ventricular wall thickness is normal.   3. Left ventricle was not well visualized.   4. Unable to evaluate left ventricular ejection fraction.   5. There is mild (grade 1) left ventricular diastolic dysfunction.   6. Normal right ventricular cavity size and systolic function.   7. Structurally normal mitral valve with normal leaflet excursion. There is calcification of the mitral valve annulus. There is trace mitral regurgitation. < end of copied text >    Stress Testing:   < from: Nuclear Stress Test-Exercise.. (23 @ 08:52) >   1. Myocardial Perfusion: Abnormal.   2. Perfusion Findings: Large-sized, severe resting defects in the apex and inferoseptal walls. Decision made not to proceed with stress images. < end of copied text >    Cath:  < from: Cardiac Catheterization (23 @ 12:52) >  Coronary Angiography   The coronary circulation is left dominant.    LM: Left main artery: Angiography shows no disease.    LAD Mid left anterior descending: There is a 90 % stenosis. First diagonal: There is a 40 % stenosis. Second diagonal: There is a 40 % stenosis.    CX: Circumflex: Angiography shows mild atherosclerosis. Left posterolateral descending artery: Angiography shows severe atherosclerosis.    RCA: Proximal right coronary artery: The segment is small. There is an 80 % stenosis.    Left Heart Cath   Ejection fraction is 60%. LV to AO pullback was performed. LHC performed: Aortic valve crossed and left ventricular pressures were obtained.      Successful PCI with ANAYELI to the mid LAD.  DAPT for 1 year < end of copied text >    Imaging:    Assessment and Plan    66 yo M w/PMHx of HLD, anxiety, asthma presenting with intermittent episodes of chest pain over the last 10-14 days, found to have STEMI and s/p stent to mLAD.      Plan:  -Patient with ANAYELI to mLAD (90%) x1 and D1 and D2 40 %  -pRCA 80 % stenosis and. Left posterolateral descending artery: shows severe atherosclerosis.   - Continue to monitor  -Continue ASA, Brilinta (30 days supply Rx sent to [facility])  -Continue high-dose statin  -Continue Losartan 50mg qd   -wt management   -Check groin site stable without hematoma or bleeding  -Will need to discuss with interventional if there is a plan for any additional procedures (possible PCI RCA lesion)     - F/u with cardiologist Dr. Garrison in 2 weeks       Discharge instruction discussed as below:    - Benefit and Risk of ASA/Plavix, Activity Cath Access site care, f/u care, reportable signs ans symptoms   - Medication adherence stressed to patient with discussion of risks of non-compliance including death and stent thrombosis  - Recommend a heart healthy diet which includes a variety of fruits and vegetables, whole grains, low fat dairy products, legumes and skinless poultry and fish; food prepared with little or no salt and minimize processed foods  - Avoid using NSAIDs (Aleve, Motrin, Ibuprofen, Naproxen) while on DAPT, please utilize Tylenol for pain control (not to exceed 4gm in 24 hours)    - pt understood and verbalized     ONEIDA Bolaños-BC  #[id number] or team     Time spent on this encounter >25 minutes              Interventional Cardiology Post Cath Progress Note      Patient is a 67y old  Male who presents with a chief complaint of chest pain (25 Dec 2023 11:04)        Pt was seen at bedside, denies pain, SOB, reporting "feels much better."    Tele: SR @ 70's     Allergies: Percocet 5/325 (Nausea)    Intolerances      Medications:  acetaminophen     Tablet .. 650 milliGRAM(s) Oral every 6 hours PRN  aspirin enteric coated 81 milliGRAM(s) Oral daily  atorvastatin 80 milliGRAM(s) Oral at bedtime  losartan 50 milliGRAM(s) Oral daily  metoprolol succinate ER 25 milliGRAM(s) Oral daily  ticagrelor 90 milliGRAM(s) Oral every 12 hours      Vitals:  T(C): 36.7 (23 @ 04:20), Max: 36.9 (23 @ 20:50)  HR: 75 (23 @ 04:20) (71 - 75)  BP: 124/74 (23 @ 04:20) (124/74 - 143/87)  BP(mean): --  RR: 18 (23 @ 04:20) (18 - 18)  SpO2: 95% (23 @ 04:20) (95% - 97%)  Wt(kg): --  Daily     Daily   I&O's Summary    25 Dec 2023 07:01  -  26 Dec 2023 07:00  --------------------------------------------------------  IN: 280 mL / OUT: 0 mL / NET: 280 mL          Review of System:   Denies chest pain, SOB, dizziness or palpitation  All other systems: no other complaints offered         Physical Exam:  Appearance: Normal  Eyes: PERRL, EOMI  HENT: Normal oral muscosa, NC/AT  Cardiovascular: S1S2, RRR, No M/R/G, no JVD, No Lower extremity edema  Procedural RFA Access Site: No hematoma, Non-tender to palpation, 2+ pulse, No bruit, No Ecchymosis  Respiratory: Clear to auscultation bilaterally  Gastrointestinal: Soft, Non tender, Normal Bowel Sounds  Musculoskeletal: No clubbing, No joint deformity   Neurologic: Non-focal  Lymphatic: No lymphadenopathy  Psychiatry: AAOx3, Mood & affect appropriate  Skin: No rashes, No ecchymoses, No cyanosis    PT/INR - ( 24 Dec 2023 11:05 )   PT: 11.0 sec;   INR: 1.05 ratio         PTT - ( 24 Dec 2023 11:05 )  PTT:31.8 sec    Lipid panel Total Cholesterol: 224  LDL: --  HDL: 44  T      Hgb A1c A1C with Estimated Average Glucose Result: 5.9 % ( @ 19:53)    ECG:    Echo:  < from: TTE W or WO Ultrasound Enhancing Agent (23 @ 07:54) >   1. Technically difficult image quality.   2. Left ventricular cavity is normal. Left ventricular wall thickness is normal.   3. Left ventricle was not well visualized.   4. Unable to evaluate left ventricular ejection fraction.   5. There is mild (grade 1) left ventricular diastolic dysfunction.   6. Normal right ventricular cavity size and systolic function.   7. Structurally normal mitral valve with normal leaflet excursion. There is calcification of the mitral valve annulus. There is trace mitral regurgitation. < end of copied text >    Stress Testing:   < from: Nuclear Stress Test-Exercise.. (23 @ 08:52) >   1. Myocardial Perfusion: Abnormal.   2. Perfusion Findings: Large-sized, severe resting defects in the apex and inferoseptal walls. Decision made not to proceed with stress images. < end of copied text >    Cath:  < from: Cardiac Catheterization (23 @ 12:52) >  Coronary Angiography   The coronary circulation is left dominant.    LM: Left main artery: Angiography shows no disease.    LAD Mid left anterior descending: There is a 90 % stenosis. First diagonal: There is a 40 % stenosis. Second diagonal: There is a 40 % stenosis.    CX: Circumflex: Angiography shows mild atherosclerosis. Left posterolateral descending artery: Angiography shows severe atherosclerosis.    RCA: Proximal right coronary artery: The segment is small. There is an 80 % stenosis.    Left Heart Cath   Ejection fraction is 60%. LV to AO pullback was performed. LHC performed: Aortic valve crossed and left ventricular pressures were obtained.      Successful PCI with ANAYELI to the mid LAD.  DAPT for 1 year < end of copied text >    Imaging:    Assessment and Plan    68 yo M w/PMHx of HLD, anxiety, asthma presenting with intermittent episodes of chest pain over the last 10-14 days, found to have STEMI and s/p stent to mLAD.      Plan:  -Patient with ANAYELI to mLAD (90%) x1 and D1 and D2 40 %  -pRCA 80 % stenosis and. Left posterolateral descending artery: shows severe atherosclerosis.   - Continue to monitor  -Continue ASA, Brilinta (30 days supply Rx sent to [facility])  -Continue high-dose statin  -Continue Losartan 50mg qd   -wt management   -Check groin site stable without hematoma or bleeding  -Will need to discuss with interventional if there is a plan for any additional procedures (possible PCI RCA lesion)     - F/u with cardiologist Dr. Garrison in 2 weeks       Discharge instruction discussed as below:    - Benefit and Risk of ASA/Plavix, Activity Cath Access site care, f/u care, reportable signs ans symptoms   - Medication adherence stressed to patient with discussion of risks of non-compliance including death and stent thrombosis  - Recommend a heart healthy diet which includes a variety of fruits and vegetables, whole grains, low fat dairy products, legumes and skinless poultry and fish; food prepared with little or no salt and minimize processed foods  - Avoid using NSAIDs (Aleve, Motrin, Ibuprofen, Naproxen) while on DAPT, please utilize Tylenol for pain control (not to exceed 4gm in 24 hours)    - pt understood and verbalized     ONEIDA Bolaños-BC  #[id number] or team     Time spent on this encounter >25 minutes              Interventional Cardiology Post Cath Progress Note      Patient is a 67y old  Male who presents with a chief complaint of chest pain (25 Dec 2023 11:04)        Pt was seen at bedside, denies pain, SOB, reporting "feels much better."    Tele: SR @ 70's     Allergies: Percocet 5/325 (Nausea)    Intolerances      Medications:  acetaminophen     Tablet .. 650 milliGRAM(s) Oral every 6 hours PRN  aspirin enteric coated 81 milliGRAM(s) Oral daily  atorvastatin 80 milliGRAM(s) Oral at bedtime  losartan 50 milliGRAM(s) Oral daily  metoprolol succinate ER 25 milliGRAM(s) Oral daily  ticagrelor 90 milliGRAM(s) Oral every 12 hours      Vitals:  T(C): 36.7 (23 @ 04:20), Max: 36.9 (23 @ 20:50)  HR: 75 (23 @ 04:20) (71 - 75)  BP: 124/74 (23 @ 04:20) (124/74 - 143/87)  BP(mean): --  RR: 18 (23 @ 04:20) (18 - 18)  SpO2: 95% (23 @ 04:20) (95% - 97%)  Wt(kg): --  Daily     Daily   I&O's Summary    25 Dec 2023 07:01  -  26 Dec 2023 07:00  --------------------------------------------------------  IN: 280 mL / OUT: 0 mL / NET: 280 mL          Review of System:   Denies chest pain, SOB, dizziness or palpitation  All other systems: no other complaints offered         Physical Exam:  Appearance: Normal  Eyes: PERRL, EOMI  HENT: Normal oral muscosa, NC/AT  Cardiovascular: S1S2, RRR, No M/R/G, no JVD, No Lower extremity edema  Procedural RFA Access Site: No hematoma, Non-tender to palpation, 2+ pulse, No bruit, No Ecchymosis  Respiratory: Clear to auscultation bilaterally  Gastrointestinal: Soft, Non tender, Normal Bowel Sounds  Musculoskeletal: No clubbing, No joint deformity   Neurologic: Non-focal  Lymphatic: No lymphadenopathy  Psychiatry: AAOx3, Mood & affect appropriate  Skin: No rashes, No ecchymoses, No cyanosis    PT/INR - ( 24 Dec 2023 11:05 )   PT: 11.0 sec;   INR: 1.05 ratio         PTT - ( 24 Dec 2023 11:05 )  PTT:31.8 sec    Lipid panel Total Cholesterol: 224  LDL: --  HDL: 44  T      Hgb A1c A1C with Estimated Average Glucose Result: 5.9 % ( @ 19:53)    ECG:    Echo:  < from: TTE W or WO Ultrasound Enhancing Agent (23 @ 07:54) >   1. Technically difficult image quality.   2. Left ventricular cavity is normal. Left ventricular wall thickness is normal.   3. Left ventricle was not well visualized.   4. Unable to evaluate left ventricular ejection fraction.   5. There is mild (grade 1) left ventricular diastolic dysfunction.   6. Normal right ventricular cavity size and systolic function.   7. Structurally normal mitral valve with normal leaflet excursion. There is calcification of the mitral valve annulus. There is trace mitral regurgitation. < end of copied text >    Stress Testing:   < from: Nuclear Stress Test-Exercise.. (23 @ 08:52) >   1. Myocardial Perfusion: Abnormal.   2. Perfusion Findings: Large-sized, severe resting defects in the apex and inferoseptal walls. Decision made not to proceed with stress images. < end of copied text >    Cath:  < from: Cardiac Catheterization (23 @ 12:52) >  Coronary Angiography   The coronary circulation is left dominant.    LM: Left main artery: Angiography shows no disease.    LAD Mid left anterior descending: There is a 90 % stenosis. First diagonal: There is a 40 % stenosis. Second diagonal: There is a 40 % stenosis.    CX: Circumflex: Angiography shows mild atherosclerosis. Left posterolateral descending artery: Angiography shows severe atherosclerosis.    RCA: Proximal right coronary artery: The segment is small. There is an 80 % stenosis.    Left Heart Cath   Ejection fraction is 60%. LV to AO pullback was performed. LHC performed: Aortic valve crossed and left ventricular pressures were obtained.      Successful PCI with ANAYELI to the mid LAD.  DAPT for 1 year < end of copied text >    Imaging:    Assessment and Plan    68 yo M w/PMHx of HLD, anxiety, asthma presenting with intermittent episodes of chest pain over the last 10-14 days, found to have STEMI and s/p stent to mLAD.      Plan:  -Patient with ANAYELI to mLAD (90%) x1 and D1 and D2 40 %  -pRCA 80 % stenosis and Left posterolateral descending artery: shows severe atherosclerosis.   - Continue to monitor  -Continue ASA, Brilinta (30 days supply Rx sent to [facility])  -Continue high-dose statin  -Continue Losartan 50mg qd   -wt management   -Check groin site stable without hematoma or bleeding  -pRCA 80 % stenosis, The segment is small, non dominant disease, may discharge home as per Dr. Bennett     - F/u with cardiologist Dr. Garrison in 2 weeks       Discharge instruction discussed as below:    - Benefit and Risk of ASA/Plavix, Activity Cath Access site care, f/u care, reportable signs ans symptoms   - Medication adherence stressed to patient with discussion of risks of non-compliance including death and stent thrombosis  - Recommend a heart healthy diet which includes a variety of fruits and vegetables, whole grains, low fat dairy products, legumes and skinless poultry and fish; food prepared with little or no salt and minimize processed foods  - Avoid using NSAIDs (Aleve, Motrin, Ibuprofen, Naproxen) while on DAPT, please utilize Tylenol for pain control (not to exceed 4gm in 24 hours)    - pt understood and verbalized     ONEIDA Bolaños-BC  #[id number] or team     Time spent on this encounter >25 minutes              Interventional Cardiology Post Cath Progress Note      Patient is a 67y old  Male who presents with a chief complaint of chest pain (25 Dec 2023 11:04)        Pt was seen at bedside, denies pain, SOB, reporting "feels much better."    Tele: SR @ 70's     Allergies: Percocet 5/325 (Nausea)    Intolerances      Medications:  acetaminophen     Tablet .. 650 milliGRAM(s) Oral every 6 hours PRN  aspirin enteric coated 81 milliGRAM(s) Oral daily  atorvastatin 80 milliGRAM(s) Oral at bedtime  losartan 50 milliGRAM(s) Oral daily  metoprolol succinate ER 25 milliGRAM(s) Oral daily  ticagrelor 90 milliGRAM(s) Oral every 12 hours      Vitals:  T(C): 36.7 (23 @ 04:20), Max: 36.9 (23 @ 20:50)  HR: 75 (23 @ 04:20) (71 - 75)  BP: 124/74 (23 @ 04:20) (124/74 - 143/87)  BP(mean): --  RR: 18 (23 @ 04:20) (18 - 18)  SpO2: 95% (23 @ 04:20) (95% - 97%)  Wt(kg): --  Daily     Daily   I&O's Summary    25 Dec 2023 07:01  -  26 Dec 2023 07:00  --------------------------------------------------------  IN: 280 mL / OUT: 0 mL / NET: 280 mL          Review of System:   Denies chest pain, SOB, dizziness or palpitation  All other systems: no other complaints offered         Physical Exam:  Appearance: Normal  Eyes: PERRL, EOMI  HENT: Normal oral muscosa, NC/AT  Cardiovascular: S1S2, RRR, No M/R/G, no JVD, No Lower extremity edema  Procedural RFA Access Site: No hematoma, Non-tender to palpation, 2+ pulse, No bruit, No Ecchymosis  Respiratory: Clear to auscultation bilaterally  Gastrointestinal: Soft, Non tender, Normal Bowel Sounds  Musculoskeletal: No clubbing, No joint deformity   Neurologic: Non-focal  Lymphatic: No lymphadenopathy  Psychiatry: AAOx3, Mood & affect appropriate  Skin: No rashes, No ecchymoses, No cyanosis    PT/INR - ( 24 Dec 2023 11:05 )   PT: 11.0 sec;   INR: 1.05 ratio         PTT - ( 24 Dec 2023 11:05 )  PTT:31.8 sec    Lipid panel Total Cholesterol: 224  LDL: --  HDL: 44  T      Hgb A1c A1C with Estimated Average Glucose Result: 5.9 % ( @ 19:53)    ECG:    Echo:  < from: TTE W or WO Ultrasound Enhancing Agent (23 @ 07:54) >   1. Technically difficult image quality.   2. Left ventricular cavity is normal. Left ventricular wall thickness is normal.   3. Left ventricle was not well visualized.   4. Unable to evaluate left ventricular ejection fraction.   5. There is mild (grade 1) left ventricular diastolic dysfunction.   6. Normal right ventricular cavity size and systolic function.   7. Structurally normal mitral valve with normal leaflet excursion. There is calcification of the mitral valve annulus. There is trace mitral regurgitation. < end of copied text >    Stress Testing:   < from: Nuclear Stress Test-Exercise.. (23 @ 08:52) >   1. Myocardial Perfusion: Abnormal.   2. Perfusion Findings: Large-sized, severe resting defects in the apex and inferoseptal walls. Decision made not to proceed with stress images. < end of copied text >    Cath:  < from: Cardiac Catheterization (23 @ 12:52) >  Coronary Angiography   The coronary circulation is left dominant.    LM: Left main artery: Angiography shows no disease.    LAD Mid left anterior descending: There is a 90 % stenosis. First diagonal: There is a 40 % stenosis. Second diagonal: There is a 40 % stenosis.    CX: Circumflex: Angiography shows mild atherosclerosis. Left posterolateral descending artery: Angiography shows severe atherosclerosis.    RCA: Proximal right coronary artery: The segment is small. There is an 80 % stenosis.    Left Heart Cath   Ejection fraction is 60%. LV to AO pullback was performed. LHC performed: Aortic valve crossed and left ventricular pressures were obtained.      Successful PCI with ANAYELI to the mid LAD.  DAPT for 1 year < end of copied text >    Imaging:    Assessment and Plan    66 yo M w/PMHx of HLD, anxiety, asthma presenting with intermittent episodes of chest pain over the last 10-14 days, found to have STEMI and s/p stent to mLAD.      Plan:  -Patient with ANAYELI to mLAD (90%) x1 and D1 and D2 40 %  -pRCA 80 % stenosis and Left posterolateral descending artery: shows severe atherosclerosis.   - Continue to monitor  -Continue ASA, Brilinta (30 days supply Rx sent to [facility])  -Continue high-dose statin  -Continue Losartan 50mg qd   -wt management   -Check groin site stable without hematoma or bleeding  -pRCA 80 % stenosis, The segment is small, non dominant disease, may discharge home as per Dr. Bennett     - F/u with cardiologist Dr. Garrison in 2 weeks       Discharge instruction discussed as below:    - Benefit and Risk of ASA/Plavix, Activity Cath Access site care, f/u care, reportable signs ans symptoms   - Medication adherence stressed to patient with discussion of risks of non-compliance including death and stent thrombosis  - Recommend a heart healthy diet which includes a variety of fruits and vegetables, whole grains, low fat dairy products, legumes and skinless poultry and fish; food prepared with little or no salt and minimize processed foods  - Avoid using NSAIDs (Aleve, Motrin, Ibuprofen, Naproxen) while on DAPT, please utilize Tylenol for pain control (not to exceed 4gm in 24 hours)    - pt understood and verbalized     ONEIDA Bolaños-BC  #[id number] or team     Time spent on this encounter >25 minutes

## 2023-12-26 NOTE — PROGRESS NOTE ADULT - SUBJECTIVE AND OBJECTIVE BOX
Dustin Riley MD  Division of Hospital Medicine  Available via MS teams  ---------------------------------------------------------    KISHORE MCMAHON  67y  Male      Patient is a 67y old  Male who presents with a chief complaint of chest pain (26 Dec 2023 07:25)      INTERVAL HPI/OVERNIGHT EVENTS:  Seen at bedside. Sitting in chair. Feels well      REVIEW OF SYSTEMS: 10 point ROS negative unless listed above    T(C): 36.5 (12-26-23 @ 11:49), Max: 36.9 (12-25-23 @ 20:50)  HR: 71 (12-26-23 @ 11:49) (71 - 75)  BP: 175/78 (12-26-23 @ 11:49) (124/74 - 175/78)  RR: 18 (12-26-23 @ 11:49) (18 - 18)  SpO2: 95% (12-26-23 @ 11:49) (95% - 96%)  Wt(kg): --Vital Signs Last 24 Hrs  T(C): 36.5 (26 Dec 2023 11:49), Max: 36.9 (25 Dec 2023 20:50)  T(F): 97.7 (26 Dec 2023 11:49), Max: 98.5 (25 Dec 2023 20:50)  HR: 71 (26 Dec 2023 11:49) (71 - 75)  BP: 175/78 (26 Dec 2023 11:49) (124/74 - 175/78)  BP(mean): --  RR: 18 (26 Dec 2023 11:49) (18 - 18)  SpO2: 95% (26 Dec 2023 11:49) (95% - 96%)    Parameters below as of 26 Dec 2023 11:49  Patient On (Oxygen Delivery Method): room air        PHYSICAL EXAM:  GENERAL: NAD, well-developed  NECK: Supple, No JVD  CHEST/LUNG: Clear to auscultation bilaterally; No wheeze  HEART: Regular rate and rhythm; No murmurs, rubs, or gallops  ABDOMEN: Soft, Nontender, Nondistended; Bowel sounds present  EXTREMITIES:  2+ Peripheral Pulses, No clubbing, cyanosis, or edema  NEUROLOGY: AAOx3; non-focal  SKIN: No rashes or lesions        LABS:                        14.4   10.50 )-----------( 246      ( 26 Dec 2023 07:29 )             43.4     12-26    143  |  108  |  24<H>  ----------------------------<  114<H>  4.1   |  19<L>  |  0.95    Ca    9.7      26 Dec 2023 07:29        Urinalysis Basic - ( 26 Dec 2023 07:29 )    Color: x / Appearance: x / SG: x / pH: x  Gluc: 114 mg/dL / Ketone: x  / Bili: x / Urobili: x   Blood: x / Protein: x / Nitrite: x   Leuk Esterase: x / RBC: x / WBC x   Sq Epi: x / Non Sq Epi: x / Bacteria: x      CAPILLARY BLOOD GLUCOSE            Urinalysis Basic - ( 26 Dec 2023 07:29 )    Color: x / Appearance: x / SG: x / pH: x  Gluc: 114 mg/dL / Ketone: x  / Bili: x / Urobili: x   Blood: x / Protein: x / Nitrite: x   Leuk Esterase: x / RBC: x / WBC x   Sq Epi: x / Non Sq Epi: x / Bacteria: x        RADIOLOGY & ADDITIONAL TESTS:    Imaging Personally Reviewed:  [ ] YES  [ ] NO

## 2023-12-26 NOTE — DISCHARGE NOTE PROVIDER - NSDCFUADDAPPT_GEN_ALL_CORE_FT
APPTS ARE READY TO BE MADE: [x ] YES    Best Family or Patient Contact (if needed):    Additional Information about above appointments (if needed):    1:   2:   3:     Other comments or requests:    APPTS ARE READY TO BE MADE: [x ] YES    Best Family or Patient Contact (if needed):    Additional Information about above appointments (if needed):    1:   2:   3:     Other comments or requests:     Patient was scheduled for an appointment on 12/28/2023 3:45PM at [address] [place] [address] with NINFA VICTOR. Patient/Caregiver was advised of appointment details.       Patient was scheduled for an appointment on  01/08/2024 4:30PM at [address] [facility] with Dr. TY. Patient/Caregiver was advised of appointment details.     APPTS ARE READY TO BE MADE: [x ] YES    Best Family or Patient Contact (if needed):    Additional Information about above appointments (if needed):    1:   2:   3:     Other comments or requests:     Patient was scheduled for an appointment on 12/28/2023 3:45PM at [address] [place] [address] with NINFA VICTOR. Patient/Caregiver was advised of appointment details.       Patient was scheduled for an appointment on  01/08/2024 4:30PM at [facility] with Dr. TY. Patient/Caregiver was advised of appointment details.

## 2023-12-26 NOTE — PROGRESS NOTE ADULT - SUBJECTIVE AND OBJECTIVE BOX
[facility] - Kristan Christensen, Dougie Ramos Savella, Cohen  Office Number:  [phone]    Patient resting comfortably in bed in NAD.  Laying flat with no respiratory distress.  No complaints of chest pain, dyspnea, palpitations, PND, or orthopnea.    ROS: negative unless otherwise mentioned.    Telemetry:  sr    MEDICATIONS  (STANDING):  aspirin enteric coated 81 milliGRAM(s) Oral daily  atorvastatin 80 milliGRAM(s) Oral at bedtime  losartan 50 milliGRAM(s) Oral daily  metoprolol succinate ER 25 milliGRAM(s) Oral daily  ticagrelor 90 milliGRAM(s) Oral every 12 hours    MEDICATIONS  (PRN):  acetaminophen     Tablet .. 650 milliGRAM(s) Oral every 6 hours PRN Mild Pain (1 - 3)      Allergies    Percocet 5/325 (Nausea)    Intolerances        Vital Signs Last 24 Hrs  T(C): 36.7 (26 Dec 2023 04:20), Max: 36.9 (25 Dec 2023 20:50)  T(F): 98 (26 Dec 2023 04:20), Max: 98.5 (25 Dec 2023 20:50)  HR: 75 (26 Dec 2023 04:20) (71 - 75)  BP: 124/74 (26 Dec 2023 04:20) (124/74 - 143/87)  BP(mean): --  RR: 18 (26 Dec 2023 04:20) (18 - 18)  SpO2: 95% (26 Dec 2023 04:20) (95% - 97%)    Parameters below as of 26 Dec 2023 04:20  Patient On (Oxygen Delivery Method): room air        I&O's Summary    25 Dec 2023 07:01  -  26 Dec 2023 07:00  --------------------------------------------------------  IN: 280 mL / OUT: 0 mL / NET: 280 mL        ON EXAM:    General: NAD, awake and alert, oriented x 3  HEENT: Mucous membranes are moist, anicteric  Lungs: Non-labored, breath sounds are clear bilaterally, No wheezing, rales or rhonchi  Cardiovascular: Regular, S1 and S2, no murmurs, rubs, or gallops  Gastrointestinal: Bowel Sounds present, soft, nontender.   Lymph: No peripheral edema. No lymphadenopathy.  Skin: No rashes or ulcers  Psych:  Mood & affect appropriate    LABS: All Labs Reviewed:                        14.4   10.50 )-----------( 246      ( 26 Dec 2023 07:29 )             43.4                         16.0   7.66  )-----------( 263      ( 23 Dec 2023 10:35 )             48.5     26 Dec 2023 07:29    143    |  108    |  24     ----------------------------<  114    4.1     |  19     |  0.95   23 Dec 2023 10:35    140    |  106    |  18     ----------------------------<  156    4.7     |  24     |  0.81     Ca    9.7        26 Dec 2023 07:29  Ca    9.6        23 Dec 2023 10:35    TPro  7.7    /  Alb  4.7    /  TBili  0.7    /  DBili  x      /  AST  38     /  ALT  40     /  AlkPhos  99     23 Dec 2023 10:35    PT/INR - ( 24 Dec 2023 11:05 )   PT: 11.0 sec;   INR: 1.05 ratio         PTT - ( 24 Dec 2023 11:05 )  PTT:31.8 sec      Blood Culture:        [facility] - Kristan Christensen, Dougie Ramos Savella, Cohen  Office Number:  [phone]    Patient resting comfortably in bed in NAD.  Laying flat with no respiratory distress.  No complaints of chest pain, dyspnea, palpitations, PND, or orthopnea.    ROS: negative unless otherwise mentioned.    Telemetry:  sr    MEDICATIONS  (STANDING):  aspirin enteric coated 81 milliGRAM(s) Oral daily  atorvastatin 80 milliGRAM(s) Oral at bedtime  losartan 50 milliGRAM(s) Oral daily  metoprolol succinate ER 25 milliGRAM(s) Oral daily  ticagrelor 90 milliGRAM(s) Oral every 12 hours    MEDICATIONS  (PRN):  acetaminophen     Tablet .. 650 milliGRAM(s) Oral every 6 hours PRN Mild Pain (1 - 3)      Allergies    Percocet 5/325 (Nausea)    Intolerances        Vital Signs Last 24 Hrs  T(C): 36.7 (26 Dec 2023 04:20), Max: 36.9 (25 Dec 2023 20:50)  T(F): 98 (26 Dec 2023 04:20), Max: 98.5 (25 Dec 2023 20:50)  HR: 75 (26 Dec 2023 04:20) (71 - 75)  BP: 124/74 (26 Dec 2023 04:20) (124/74 - 143/87)  BP(mean): --  RR: 18 (26 Dec 2023 04:20) (18 - 18)  SpO2: 95% (26 Dec 2023 04:20) (95% - 97%)    Parameters below as of 26 Dec 2023 04:20  Patient On (Oxygen Delivery Method): room air        I&O's Summary    25 Dec 2023 07:01  -  26 Dec 2023 07:00  --------------------------------------------------------  IN: 280 mL / OUT: 0 mL / NET: 280 mL        ON EXAM:    General: NAD, awake and alert, oriented x 3  HEENT: Mucous membranes are moist, anicteric  Lungs: Non-labored, breath sounds are clear bilaterally, No wheezing, rales or rhonchi  Cardiovascular: Regular, S1 and S2, no murmurs, rubs, or gallops  Gastrointestinal: Bowel Sounds present, soft, nontender.   Lymph: No peripheral edema. No lymphadenopathy.  Skin: No rashes or ulcers  Psych:  Mood & affect appropriate    LABS: All Labs Reviewed:                        14.4   10.50 )-----------( 246      ( 26 Dec 2023 07:29 )             43.4                         16.0   7.66  )-----------( 263      ( 23 Dec 2023 10:35 )             48.5     26 Dec 2023 07:29    143    |  108    |  24     ----------------------------<  114    4.1     |  19     |  0.95   23 Dec 2023 10:35    140    |  106    |  18     ----------------------------<  156    4.7     |  24     |  0.81     Ca    9.7        26 Dec 2023 07:29  Ca    9.6        23 Dec 2023 10:35    TPro  7.7    /  Alb  4.7    /  TBili  0.7    /  DBili  x      /  AST  38     /  ALT  40     /  AlkPhos  99     23 Dec 2023 10:35    PT/INR - ( 24 Dec 2023 11:05 )   PT: 11.0 sec;   INR: 1.05 ratio         PTT - ( 24 Dec 2023 11:05 )  PTT:31.8 sec      Blood Culture:

## 2023-12-26 NOTE — DISCHARGE NOTE NURSING/CASE MANAGEMENT/SOCIAL WORK - PATIENT PORTAL LINK FT
You can access the FollowMyHealth Patient Portal offered by [facility] by registering at the following website: [email or web address]. By joining [employer]’s FollowMyHealth portal, you will also be able to view your health information using other applications (apps) compatible with our system. You can access the FollowMyHealth Patient Portal offered by [facility] by registering at the following website: [email or web address]. By joining [employer]’s FollowMyHealth portal, you will also be able to view your health information using other applications (apps) compatible with our system.

## 2023-12-26 NOTE — PHARMACOTHERAPY INTERVENTION NOTE - COMMENTS
Counseled patient on the following new discharge medications names (brand/generic), indication, and possible side effects:    aspirin 81 mg oral tablet: 1 tab orally once a day  clopidogrel 75 mg oral tablet: 1 tab orally once a day  atorvastatin 80 mg oral tablet: 1 tab orally once a day  losartan 50 mg oral tablet: 1 tab orally once a day  metoprolol succinate ER 25 mg oral tablet: 1 tab orally once a day    Discussed in depth the importance of adherence to DAPT to prevent stent thrombosis. Educated patient on the increased risk of bleeding and to avoid OTC medications such as ibuprofen (Advil, Motrin) and naproxen (Aleve) and to take acetaminophen (Tylenol) instead if needed. Also discussed drug interaction with omeprazole (Prilosec) and esomeprazole (Nexium) OTC, and to avoid these medications as they can decrease the efficacy of clopidogrel and increase risk of stent thrombosis. Provided medication cards. Patient questions and concerns were answered and addressed. Patient demonstrated understanding and to follow up with cardiologist.    Provided medication cards. Patient questions and concerns were answered and addressed. Patient demonstrated understanding.    Charisse Clifton, PharmD  Transitions of Care Pharmacist  Available on [employer] Teams  Spectra #[phone]     Counseled patient on the following new discharge medications names (brand/generic), indication, and possible side effects:    aspirin 81 mg oral tablet: 1 tab orally once a day  clopidogrel 75 mg oral tablet: 1 tab orally once a day  atorvastatin 80 mg oral tablet: 1 tab orally once a day  losartan 50 mg oral tablet: 1 tab orally once a day  metoprolol succinate ER 25 mg oral tablet: 1 tab orally once a day    Discussed in depth the importance of adherence to DAPT to prevent stent thrombosis. Educated patient on the increased risk of bleeding and to avoid OTC medications such as ibuprofen (Advil, Motrin) and naproxen (Aleve) and to take acetaminophen (Tylenol) instead if needed. Also discussed drug interaction with omeprazole (Prilosec) and esomeprazole (Nexium) OTC, and to avoid these medications as they can decrease the efficacy of clopidogrel and increase risk of stent thrombosis. Provided medication cards. Patient questions and concerns were answered and addressed. Patient demonstrated understanding and to follow up with cardiologist.    Provided medication cards. Patient questions and concerns were answered and addressed. Patient demonstrated understanding.    Charisse Clifton, PharmD  Transitions of Care Pharmacist  Available on [employer] Teams  Spectra #[phone]

## 2023-12-26 NOTE — PROGRESS NOTE ADULT - PROBLEM SELECTOR PLAN 1
[facility] s/p ANAYELI to mLAD x 1  - continue asa, high-intensity statin  - Will change Brilinta to Plavix given cost. Will load with plavix today and start 75mg thereafter  - continue losartan 50mg daily  - c/w Toprol XL 25mg daily  - repeat TTE pending  - d/w Cards, Dr. Garrison in person today [facility] s/p ANAYELI to mLAD x 1  - continue asa, high-intensity statin  - Will change Brilinta to Plavix given cost. Will load with plavix today and start 75mg thereafter  - continue losartan 50mg daily  - c/w Toprol XL 25mg daily  - repeat TTE pending  - d/w Cards, Dr. Garrison in person today

## 2023-12-26 NOTE — DISCHARGE NOTE NURSING/CASE MANAGEMENT/SOCIAL WORK - NSDCPEFALRISK_GEN_ALL_CORE
For information on Fall & Injury Prevention, visit: [email or web address] OR  [email or web address] OR  https://www.cdc.gov/steadi/patient.html For information on Fall & Injury Prevention, visit: [email or web address] OR  [email or web address] OR  https://www.cdc.gov/steadi/patient.html

## 2023-12-26 NOTE — PROGRESS NOTE ADULT - ASSESSMENT
Lucas is a 67-year-old male with past medical history of hypertension, hyperlipidemia, anxiety, asthma presenting with intermittent episodes of chest pain over the last 10-14 days.    - chest pain with anterior ekg changes, now s/p cath and pci to mid lad  - has rca disease though non dominant, small and diffuse, and no intervention warranted at this time  - cont asa, brilinta and statin  - cont losartan  - maintaining sr on telemetry  - echocardiogram to be repeated (study from weekend was not interpretable)  - plan for dc home today. He will follow up with me in the office in 2 weeks.

## 2023-12-26 NOTE — DISCHARGE NOTE PROVIDER - PROVIDER TOKENS
PROVIDER:[TOKEN:[[id number]],FOLLOWUP:[1-3 days],ESTABLISHEDPATIENT:[T]],PROVIDER:[TOKEN:[[id number]],FOLLOWUP:[2 weeks],ESTABLISHEDPATIENT:[T]] PROVIDER:[TOKEN:[[id number]],FOLLOWUP:[1-3 days],ESTABLISHEDPATIENT:[T]],PROVIDER:[TOKEN:[[id number]],FOLLOWUP:[2 weeks],ESTABLISHEDPATIENT:[T]]

## 2023-12-26 NOTE — DISCHARGE NOTE PROVIDER - CARE PROVIDER_API CALL
Phu Tolliver  Physician Assistant Services  [address] [place] [address]  Phone: [phone]  Fax: [phone]  Established Patient  Follow Up Time: 1-3 days    Danis Garrison  Cardiovascular Disease  [address] [place] [address]  Phone: [phone]  Fax: [phone]  Established Patient  Follow Up Time: 2 weeks   Phu Tolliver  Physician Assistant Services  [address] [place] [id number]  Phone: [phone]  Fax: [phone]  Established Patient  Follow Up Time: 1-3 days    Danis Garrison  Cardiovascular Disease  [address] [place] [address]  Phone: [phone]  Fax: [phone]  Established Patient  Follow Up Time: 2 weeks

## 2023-12-26 NOTE — DISCHARGE NOTE PROVIDER - CARE PROVIDERS DIRECT ADDRESSES
,[email or web address],DirectAddress_Unknown ,[email or web address],DirectAddress_Unknown

## 2023-12-26 NOTE — DISCHARGE NOTE PROVIDER - NSDCCPTREATMENT_GEN_ALL_CORE_FT
PRINCIPAL PROCEDURE  Procedure: Transthoracic echocardiography (TTE)  Findings and Treatment:      PRINCIPAL PROCEDURE  Procedure: Transthoracic echocardiography (TTE)  Findings and Treatment:    1. Left ventricular systolic function is normal with an ejection fraction of 71 % by Huizar's method of disks.   2. No pericardial effusion seen.   3. Compared to the transthoracic echocardiogram performed on 12/24/2023 LV function is assessed.   4. There is no evidence of a left ventricular thrombus.

## 2023-12-26 NOTE — DISCHARGE NOTE PROVIDER - NSDCCPCAREPLAN_GEN_ALL_CORE_FT
PRINCIPAL DISCHARGE DIAGNOSIS  Diagnosis: Unstable angina  Assessment and Plan of Treatment: - One stent placed to the left anterior descending (LAD) coronary artery  - Echocardiogram performed was unreadable; repeat echo pending ***  - Please take aspirin 81 mg and plavix 75 mg daily  - You recieved plavix load while hospitalized on 12/26/23  - Medication adherence stressed to patient with discussion of risks of non-compliance including death and stent thrombosis  - Recommend a heart healthy diet which includes a variety of fruits and vegetables, whole grains, low fat dairy products, legumes and skinless poultry and fish; food prepared with little or no salt and minimize processed foods  - Avoid using NSAIDs (Aleve, Motrin, Ibuprofen, Naproxen) while on DAPT, please utilize Tylenol for pain control (not to exceed 4gm in 24 hours)  - Please follow-up with your primary care physician within one week of discharge.  - Please follow-up with Dr Garrison in 2 weeks in office      SECONDARY DISCHARGE DIAGNOSES  Diagnosis: S/P cardiac catheterization  Assessment and Plan of Treatment: Left heart catheterization performed on 12/24/23  1 drug-elluding stent (ANAYELI) placed to LAD; 90% stenosis  Please take aspirin 81 mg daily and plavix 75 mg daily  Angioplasty or coronary stenting are procedures to open up narrowed or blocked coronary arteries in the heart.  A stent is a tiny metal tube that helps to prop open an artery in the heart muscle.    Your doctor will instruct you when you can drive or resume usual physical activities  You MUST take aspirin & another agent Plavix to help prevent clots inside the stent.  It is VERY important to take these medications as directed unless your cardiologist says it is OK to stop.  If another physican advises you to stop them, call cardiologist to discuss this advise since there is a risk of a heart attack or even death stopping these medications earlier than they should be. The most common problems after coronary stenting is bleeding, bruising, & soreness at the tube insertion site - you can use tylenol for discomfort if not contraindicated. Call your doctor if you have chest pain, fever, pain, swelling, or redness where the tube went in     PRINCIPAL DISCHARGE DIAGNOSIS  Diagnosis: Unstable angina  Assessment and Plan of Treatment: - One stent placed to the left anterior descending (LAD) coronary artery  - Echocardiogram performed was unreadable; repeat echo is within normal limits. Ejection fraction 71%  - Please take aspirin 81 mg and plavix 75 mg daily  - You recieved plavix load while hospitalized on 12/26/23  - Start Atorvastatin 80 mg daily  - Start Losartan 50 mg daily  - Becuase ejection fraction on echocardiogram is normal, metoprolol is not needed at this time.   - Medication adherence stressed to patient with discussion of risks of non-compliance including death and stent thrombosis  - Recommend a heart healthy diet which includes a variety of fruits and vegetables, whole grains, low fat dairy products, legumes and skinless poultry and fish; food prepared with little or no salt and minimize processed foods  - Avoid using NSAIDs (Aleve, Motrin, Ibuprofen, Naproxen) while on DAPT, please utilize Tylenol for pain control (not to exceed 4gm in 24 hours)  - Please follow-up with your primary care physician within one week of discharge.  - Please follow-up with Dr Garrison in 2 weeks in office      SECONDARY DISCHARGE DIAGNOSES  Diagnosis: S/P cardiac catheterization  Assessment and Plan of Treatment: Left heart catheterization performed on 12/24/23  1 drug-elluding stent (ANAYELI) placed to LAD; 90% stenosis  Please take aspirin 81 mg daily and plavix 75 mg daily  Angioplasty or coronary stenting are procedures to open up narrowed or blocked coronary arteries in the heart.  A stent is a tiny metal tube that helps to prop open an artery in the heart muscle.    Your doctor will instruct you when you can drive or resume usual physical activities  You MUST take aspirin & another agent Plavix to help prevent clots inside the stent.  It is VERY important to take these medications as directed unless your cardiologist says it is OK to stop.  If another physican advises you to stop them, call cardiologist to discuss this advise since there is a risk of a heart attack or even death stopping these medications earlier than they should be. The most common problems after coronary stenting is bleeding, bruising, & soreness at the tube insertion site - you can use tylenol for discomfort if not contraindicated. Call your doctor if you have chest pain, fever, pain, swelling, or redness where the tube went in     PRINCIPAL DISCHARGE DIAGNOSIS  Diagnosis: STEMI (ST elevation myocardial infarction)  Assessment and Plan of Treatment: - One stent placed to the left anterior descending (LAD) coronary artery  - Echocardiogram performed was unreadable; repeat echo is within normal limits. Ejection fraction 71%  - Please take aspirin 81 mg and plavix 75 mg daily  - You recieved plavix load while hospitalized on 12/26/23  - Start Atorvastatin 80 mg daily  - Start Losartan 50 mg daily  - Becuase ejection fraction on echocardiogram is normal, metoprolol is not needed at this time.   - Medication adherence stressed to patient with discussion of risks of non-compliance including death and stent thrombosis  - Recommend a heart healthy diet which includes a variety of fruits and vegetables, whole grains, low fat dairy products, legumes and skinless poultry and fish; food prepared with little or no salt and minimize processed foods  - Avoid using NSAIDs (Aleve, Motrin, Ibuprofen, Naproxen) while on DAPT, please utilize Tylenol for pain control (not to exceed 4gm in 24 hours)  - Please follow-up with your primary care physician within one week of discharge.  - Please follow-up with Dr Garrison in 2 weeks in office      SECONDARY DISCHARGE DIAGNOSES  Diagnosis: S/P cardiac catheterization  Assessment and Plan of Treatment: Left heart catheterization performed on 12/24/23  1 drug-elluding stent (ANAYELI) placed to LAD; 90% stenosis  Please take aspirin 81 mg daily and plavix 75 mg daily  Angioplasty or coronary stenting are procedures to open up narrowed or blocked coronary arteries in the heart.  A stent is a tiny metal tube that helps to prop open an artery in the heart muscle.    Your doctor will instruct you when you can drive or resume usual physical activities  You MUST take aspirin & another agent Plavix to help prevent clots inside the stent.  It is VERY important to take these medications as directed unless your cardiologist says it is OK to stop.  If another physican advises you to stop them, call cardiologist to discuss this advise since there is a risk of a heart attack or even death stopping these medications earlier than they should be. The most common problems after coronary stenting is bleeding, bruising, & soreness at the tube insertion site - you can use tylenol for discomfort if not contraindicated. Call your doctor if you have chest pain, fever, pain, swelling, or redness where the tube went in

## 2023-12-26 NOTE — DISCHARGE NOTE PROVIDER - NSDCFUSCHEDAPPT_GEN_ALL_CORE_FT
Jero Abbott  [facility]  UROLOGY [address]  Scheduled Appointment: 01/10/2024     Jero Abbott  [facility] Physician [facility]  UROLOGY [address]  Scheduled Appointment: 01/10/2024     [facility]  INTMED [address]  Scheduled Appointment: 12/28/2023    Danis Garrison  [facility]  [address]OLOGY [address]  Scheduled Appointment: 01/08/2024    Jero Abbott  [facility]  UROLOGY [address]  Scheduled Appointment: 01/10/2024     [facility]  INTMED [address]  Scheduled Appointment: 12/28/2023    Danis Garrison  [facility]  [address]Y [address] [facility] [address]  Scheduled Appointment: 01/08/2024    Jero Abbott  [facility]  UROLOGY [address]  Scheduled Appointment: 01/10/2024

## 2023-12-26 NOTE — DISCHARGE NOTE PROVIDER - NSDCMRMEDTOKEN_GEN_ALL_CORE_FT
Albuterol (Eqv-Proventil HFA) 90 mcg/inh inhalation aerosol: 2 puff(s) inhaled every 12 hours  Aleve 220 mg oral tablet: 1 tab(s) orally every 8 hours, As Needed for mild to moderate pain  Brilinta (ticagrelor) 90 mg oral tablet: 1 tab(s) orally every 12 hours  Brilinta (ticagrelor) 90 mg oral tablet: 1 tab(s) orally 2 times a day  Dulera 50 mcg-5 mcg/inh inhalation aerosol: 2 puff(s) inhaled 2 times a day  famotidine 20 mg oral tablet: 1 tab(s) orally every 12 hours, As needed, Dyspepsia  multivitamin:   Tylenol 500 mg oral tablet: 2 tab(s) orally every 8 hours as needed for mild to moderate pain  Vitamin B12:   Zoloft 50 mg oral tablet: 1 tab(s) orally once a day   Albuterol (Eqv-Proventil HFA) 90 mcg/inh inhalation aerosol: 2 puff(s) inhaled every 12 hours  Aleve 220 mg oral tablet: 1 tab(s) orally every 8 hours, As Needed for mild to moderate pain  Dulera 50 mcg-5 mcg/inh inhalation aerosol: 2 puff(s) inhaled 2 times a day  famotidine 20 mg oral tablet: 1 tab(s) orally every 12 hours, As needed, Dyspepsia  multivitamin:   Tylenol 500 mg oral tablet: 2 tab(s) orally every 8 hours as needed for mild to moderate pain  Vitamin B12:   Zoloft 50 mg oral tablet: 1 tab(s) orally once a day   acetaminophen 325 mg oral tablet: 2 tab(s) orally every 6 hours As needed Mild Pain (1 - 3)  Albuterol (Eqv-Proventil HFA) 90 mcg/inh inhalation aerosol: 2 puff(s) inhaled every 12 hours  aspirin 81 mg oral delayed release tablet: 1 tab(s) orally once a day  atorvastatin 80 mg oral tablet: 1 tab(s) orally once a day (at bedtime)  cardiac rehab: as instructed  clopidogrel 75 mg oral tablet: 1 tab(s) orally once a day  Dulera 50 mcg-5 mcg/inh inhalation aerosol: 2 puff(s) inhaled 2 times a day  famotidine 20 mg oral tablet: 1 tab(s) orally every 12 hours, As needed, Dyspepsia  losartan 50 mg oral tablet: 1 tab(s) orally once a day  multivitamin:   Vitamin B12:   Zoloft 50 mg oral tablet: 1 tab(s) orally once a day

## 2023-12-26 NOTE — DISCHARGE NOTE PROVIDER - HOSPITAL COURSE
HPI:  67-year-old male with past medical history of hypertension, hyperlipidemia presenting with chest pain.  Patient reports that he has had intermittent episodes of chest pain over the last 10 days.  Chest pain seems to be worse with exertion.  Patient was seen at outside hospital yesterday ruled out for MI and discharged home.  Patient presenting today with recurrent symptoms.  Patient had a plain exercise stress test in May 2023 that was unremarkable (not nuclear).  Non-smoker.  Family history of CHF and his father passed away in his 60s.  Unclear cause of heart failure.  Upon my evaluation currently patient is asymptomatic.  Denies fever/chills, shortness of breath, abdominal pain, nausea, vomiting, diarrhea, dysuria.  In the ED VSS. Trop 44.  CXR clear.  Plan to observe in the CDU on tele overnight for TTE and Nuc stress    Hospital Course: *****  Abnormal stress 12/24. s/p LHC x1 ANAYELI mLAD 90%. DAPT for 1 year. Echo 12/24 poor quality; pending repeat. Start brilinta. Pharmacy copay is $300/month; start plavix instead per Dr Garrison.    INC****    Important Medication Changes and Reason: ****  ASA 81 mg daily - DAPT  Plavix 75 mg daily - DAPT    Active or Pending Issues Requiring Follow-up:  PCP within 1 week of discharge  Cardiology Dr Garrison in 2 weeks    Advanced Directives:   [x ] Full code  [ ] DNR  [ ] Hospice    Discharge Diagnoses:  Unstable angina       HPI:  67-year-old male with past medical history of hypertension, hyperlipidemia presenting with chest pain.  Patient reports that he has had intermittent episodes of chest pain over the last 10 days.  Chest pain seems to be worse with exertion.  Patient was seen at outside hospital yesterday ruled out for MI and discharged home.  Patient presenting today with recurrent symptoms.  Patient had a plain exercise stress test in May 2023 that was unremarkable (not nuclear).  Non-smoker.  Family history of CHF and his father passed away in his 60s.  Unclear cause of heart failure.  Upon my evaluation currently patient is asymptomatic.  Denies fever/chills, shortness of breath, abdominal pain, nausea, vomiting, diarrhea, dysuria.  In the ED VSS. Trop 44.  CXR clear.  Plan to observe in the CDU on tele overnight for TTE and Nuc stress    Hospital Course: *****  Abnormal stress 12/24. s/p LHC x1 ANAYELI mLAD 90%. DAPT for 1 year. Echo 12/24 poor quality; pending repeat. Start brilinta. Pharmacy copay is $300/month; start plavix instead per Dr Garrison. Echo 12/24 poor quality, repeat 12/26 shows ...     INC****    Important Medication Changes and Reason: ****  ASA 81 mg daily - DAPT  Plavix 75 mg daily - DAPT    Active or Pending Issues Requiring Follow-up:  PCP within 1 week of discharge  Cardiology Dr Garrison in 2 weeks    Advanced Directives:   [x ] Full code  [ ] DNR  [ ] Hospice    Discharge Diagnoses:  Unstable angina       HPI:  67-year-old male with past medical history of hypertension, hyperlipidemia presenting with chest pain.  Patient reports that he has had intermittent episodes of chest pain over the last 10 days.  Chest pain seems to be worse with exertion.  Patient was seen at outside hospital yesterday ruled out for MI and discharged home.  Patient presenting today with recurrent symptoms.  Patient had a plain exercise stress test in May 2023 that was unremarkable (not nuclear).  Non-smoker.  Family history of CHF and his father passed away in his 60s.  Unclear cause of heart failure.  Upon my evaluation currently patient is asymptomatic.  Denies fever/chills, shortness of breath, abdominal pain, nausea, vomiting, diarrhea, dysuria.  In the ED VSS. Trop 44.  CXR clear.  Plan to observe in the CDU on tele overnight for TTE and Nuc stress    Hospital Course:   Abnormal stress 12/24. s/p LHC x1 ANAYELI mLAD 90%. DAPT for 1 year. Echo 12/24 poor quality; pending repeat. Start brilinta. Pharmacy copay is $300/month; start plavix instead per Dr Garrison. Echo 12/24 poor quality, repeat 12/26 WNL, EF 71%.    Discharge planning discussed with attending Dr Riley. Patient is medically cleared and stable for discharge. Medication Reconciliation reviewed with attending.    Important Medication Changes and Reason:  ASA 81 mg daily - DAPT  Plavix 75 mg daily - DAPT  Losartan 50 mg daily  Atorvastatin 80 mg daily    Active or Pending Issues Requiring Follow-up:  PCP within 1 week of discharge  Cardiology Dr Garrison in 2 weeks    Advanced Directives:   [x ] Full code  [ ] DNR  [ ] Hospice    Discharge Diagnoses:  Unstable angina       HPI:  67-year-old male with past medical history of hypertension, hyperlipidemia presenting with chest pain.  Patient reports that he has had intermittent episodes of chest pain over the last 10 days.  Chest pain seems to be worse with exertion.  Patient was seen at outside hospital yesterday ruled out for MI and discharged home.  Patient presenting today with recurrent symptoms.  Patient had a plain exercise stress test in May 2023 that was unremarkable (not nuclear).  Non-smoker.  Family history of CHF and his father passed away in his 60s.  Unclear cause of heart failure.  Upon my evaluation currently patient is asymptomatic.  Denies fever/chills, shortness of breath, abdominal pain, nausea, vomiting, diarrhea, dysuria.  In the ED VSS. Trop 44.  CXR clear.  Plan to observe in the CDU on tele overnight for TTE and Nuc stress    Hospital Course:   Abnormal stress 12/24. s/p LHC x1 ANAYELI mLAD 90%. DAPT for 1 year. Echo 12/24 poor quality; pending repeat. Start brilinta. Pharmacy copay is $300/month; start plavix instead per Dr Garrison. Echo 12/24 poor quality, repeat 12/26 WNL, EF 71%.    Discharge planning discussed with attending Dr Riley. Patient is medically cleared and stable for discharge. Medication Reconciliation reviewed with attending.    Important Medication Changes and Reason:  ASA 81 mg daily - DAPT  Plavix 75 mg daily - DAPT  Losartan 50 mg daily  Atorvastatin 80 mg daily    Active or Pending Issues Requiring Follow-up:  PCP within 1 week of discharge  Cardiology Dr Garrison in 2 weeks    Advanced Directives:   [x ] Full code  [ ] DNR  [ ] Hospice    Discharge Diagnoses:  STEMI  Asthma

## 2023-12-26 NOTE — DISCHARGE NOTE NURSING/CASE MANAGEMENT/SOCIAL WORK - NSDCVIVACCINE_GEN_ALL_CORE_FT
Patient: Trino Borden Date: 3/21/2018   : 1976 Attendinyear old male      [address] NOTE    Supervising Provider: Dr. Deborah Hunt  Consulting Provider: Dr. Jaclyn Melgar   Date of Consultation/Last Comprehensive Exam: 16  Referring  Provider:  Self    SUBJECTIVE:    Chief Complaint:  Left hallux diabetic foot ulcer    Wound/Ulcer Present:    Diabetic lower extremity ulcer:  Ly grade 3 (deep ulcer with abscess and/or osteomyelitis). - LEFT FOOT  Diabetic foot exam performed? No.     Current Vascular Assessment:  Physical exam and Arterial duplex study. Current Antibiotic Regimen:  IV, per Dr. Pat Frederick. Current Offloading Modality:  Shoe insert and Offloading shoe. Diabetic foot ulceration Ly grade 1 (superficial diabetic ulcer). - RIGHT FOOT    Maximum Baseline Ambulatory Status:  Ambulates unassisted    History of Present Illness: This is a 35year old obese diabetic male s/p 5th ray amputation left foot with Dr. Axel Alfred on 2016 for osteomyelitis. He completed 16 HBO2 treatments on 16 and 6 weeks of IV antibiotics per Dr. Mary Greene. He was seen in the [facility] clinic on 17. He was fitted for new shoes and inserts on 17 at [facility]. During his wound clinic appointment 17 he was noted to have significant deterioration of his left medial 1st MTP wound, with exposed joint capsule and concern for osteomyelitis. He underwent I&D and TMA of left foot  by Dr. Maxim Rhoades. The patient completed course of 22 HBO treatments on 2017. He then fell out of bed and sustained laceration to his right D2 and D3 and underwent foot irrigation and primary closure open fracture right 2nd digit and laceration right 3rd digit by Dr. Katalina iRbeiro. Recently he underwent partial amputation of the right second toe under the care of Dr. Hunter Glaser 3/6/18.       Interval history: 3/21/2018   The patient was seen prior to HBO treatment for [address] [place] [address]. Patient denies any fevers, chills, nausea or vomiting. Patient has been tolerating HBO w/o any issues. He denies any chest pain,shortness of berth or chest pain. Patient was recently diagnosed with UTI, currently on ABX. Current Treatment Regimen:   Dressing: Iodoflex tetragrip  Frequency: Three times a week   Changed by:  Patient    Review of Systems:   Pertinent items are noted in HPI (history of present illness).       Past Medical History:   Diagnosis Date   â¢ Anxiety    â¢ Chronic headaches    â¢ Chronic low back pain    â¢ Chronic venous insufficiency    â¢ Depression    â¢ Diabetes mellitus (CMS/[facility])    â¢ Diabetic nephropathy (CMS/[facility])    â¢ Esophageal reflux    â¢ Fracture    â¢ History of hyperbaric oxygen therapy 1570-6703   â¢ Hyperlipidemia    â¢ Hypertension    â¢ Hypothyroidism    â¢ Morbid obesity (CMS/[facility])    â¢ MRSA (methicillin resistant Staphylococcus aureus) infection 8/26/2010    right foot ulceration requiring debridement and dermagraft implantation   â¢ Multiple sclerosis (CMS/[facility])     DX 2006   â¢ Neurogenic bladder    â¢ Other chronic pain     spine, lower lumbar area   â¢ Personal history of traumatic fracture     right knee 20 years ago   â¢ Pneumonia    â¢ RAD (reactive airway disease)    â¢ Seizure disorder (CMS/[facility])     episode of LOC for 30 min, possible siezure, on keppra   â¢ Urinary incontinence     straight caths TID   â¢ Urinary tract infection    â¢ Wound healing, delayed [id number]     Past Surgical History:   Procedure Laterality Date   â¢ Abcess drainage  2006    right inner thigh   â¢ Debridement subcutaneous dermis or epi ea addl 20 sq cm      right foot-MRSA   â¢ Foot amputation through metatarsal Left 11/2017   â¢ Toe amputation Right 3/6/218    2nd toe, partial amputation   â¢ Wound debridement      right thigh     Social History     Social History   â¢ Marital status:      Spouse name: N/A   â¢ Number of children: N/A   â¢ Years of education: N/A     Occupational History â¢ Not on file. Social History Main Topics   â¢ Smoking status: Current Every Day Smoker     Packs/day: 0.25     Years: 25.00     Types: Cigarettes   â¢ Smokeless tobacco: Current User      Comment: uses e-cig once in a while   â¢ Alcohol use No   â¢ Drug use: No   â¢ Sexual activity: No     Other Topics Concern   â¢ Seat Belt Yes     Social History Narrative   â¢ No narrative on file     Family History   Problem Relation Age of Onset   â¢ Hypertension Father    â¢ Cancer, Lung Mother    â¢ Cancer Mother    â¢ Eczema Sister      Current Outpatient Prescriptions   Medication Sig   â¢ amoxicillin (AMOXIL) 875 MG tablet Take 1 tablet by mouth 2 times daily for 5 days. â¢ cefdinir (OMNICEF) 300 MG capsule Take 1 capsule by mouth every 12 hours for 9 days. â¢ liraglutide (VICTOZA) 18 MG/3ML pen-injector Inject 0.6 mg into the skin daily. â¢ oxyCODONE/APAP (PERCOCET) 5-325 MG per tablet take 1 tablet every 8 hours as needed for pain   â¢ sulfamethoxazole-trimethoprim (BACTRIM DS) 800-160 MG per tablet Take 1 tablet by mouth 2 times daily. â¢ VESICARE 10 MG tablet TAKE 1 TABLET BY MOUTH DAILY   â¢ NOVOLOG FLEXPEN 100 UNIT/ML pen-injector Inject 20 Units into the skin 3 times daily (before meals). â¢ Vitamin D, Ergocalciferol, 15414 units capsule Take 1 capsule by mouth every 14 days. â¢ insulin glargine (LANTUS SOLOSTAR) 100 UNIT/ML pen-injector Inject 55 Units into the skin nightly. â¢ carbamide peroxide (DEBROX) 6.5 % otic solution Place 5 drops into both ears 2 times daily. â¢ HYDROcodone-acetaminophen (VICODIN) 5-300 MG tablet Take 1 tablet by mouth 2 times daily as needed. â¢ levothyroxine (SYNTHROID, LEVOTHROID) 100 MCG tablet TAKE 1 TABLET BY MOUTH DAILY   â¢ gabapentin (NEURONTIN) 600 MG tablet Take 1,200 mg by mouth 3 times daily. Dose: Take 2 tabs= 1200mg TID   â¢ atorvastatin (LIPITOR) 80 MG tablet Take 1 tablet by mouth daily.  (Patient taking differently: Take by mouth daily. )   â¢ ezetimibe (ZETIA) 10 MG tablet Take 1 tablet by mouth daily. â¢ metFORMIN (GLUCOPHAGE) 500 MG tablet Take 2 tablets by mouth 2 times daily (with meals). â¢ citalopram (CELEXA) 10 MG tablet Take 20 mg by mouth nightly. â¢ Ascorbic Acid (VITAMIN C) 500 MG tablet Take 250 mg by mouth daily. â¢ Interferon Beta-1a 44 MCG/0.5ML Solution Auto-injector Inject 44 mcg into the skin three times a week. Inject every Monday, Wednesday and Friday   â¢ Multiple Vitamin tablet Take 1 tablet by mouth daily. â¢ acetaminophen (TYLENOL) 500 MG tablet Take 1,000 mg by mouth as needed for Pain. Dose: 2 tablets (=1,000 mg)   â¢ baclofen (LIORESAL) 10 MG tablet Take 1 tablet by mouth 4 times daily as needed (pain). (Patient taking differently: Take 20 mg by mouth 3 times daily. May take an additional tablet as needed.)   â¢ amantadine (SYMMETREL) 100 MG capsule Take 100-200 mg by mouth every morning. â¢ amitriptyline (ELAVIL) 25 MG tablet Take 25 mg by mouth daily (before breakfast). No current facility-administered medications for this encounter. ALLERGIES:  Latex; Viscopaste [gelpad dressing]; and Oxybutynin    OBJECTIVE:  Vital Signs: There were no vitals taken for this visit. Physical Exam:  General appearance: Appears stated age, Alert, obese, oriented to person, place, time and situation, in no distress and cooperative  Head:   normocephalic without obvious abnormality  Pulmonary: normal respiratory effort   Obese. Right 2nd toe with intact surgical incision with intact suture. No drainage, ischemia or necrosis. Left medial TMA with granular and fibrin base. Periwound with iodine staining and soft callus. There was no purulence, fluctuance or malodor. Periwound without increased erythema or increased warmth. Callus was debrided. Left heel left viable granular tissue and fibrin. No purulence, fluctuance or malodor. Periwound callus noted. Callus was debrided.        Wound Bed Quality:  Granulation tissue and Fibrin      Lacy-wound Quality:   Callus     Additional Descriptors:    none    Wound Measurements Per Flowsheet:                                                                   PROCEDURE:  Debridement: Selective Non-Excisional Debridement of Non-viable Tissue. Informed consent obtained. Time out was completed. Patient, procedure, and site verified. Anesthesia-  None  Size-  Less than or equal to 20 sq cm  Instrument-  Curette  Non-viable tissue removed-  Fibrin/Slough and Epidermis/dermis  Hemostasis achieved-  Pressure  Patient procedure was-  tolerated well, no complications. Refer to nursing notes for wound dimensions and assessment. Patient stable upon completion of procedure. Wound dimensions unchanged post procedure.      Procedure was Performed by:  Nurse Practitioner Keely Cifuentes NP      Laboratory assessments reviewed:    PREALBUMIN (mg/dL)   Date Value   11/14/2017 8.6 (L)   08/27/2017 16.2 (L)   01/06/2016 12.6 (L)      Albumin (g/dL)   Date Value   03/15/2018 2.9 (L)   03/10/2018 2.6 (L)   03/07/2018 3.5 (L)        Recent Labs  Lab 03/20/18  0851 03/20/18  0929 03/21/18  0834   GLUCOSE BEDSIDE 116* 171* 216*       Lab Results   Component Value Date    WBC 4.7 03/15/2018    GLUCOSE 190 (H) 03/15/2018    HGBA1C 10.6 (H) 12/29/2017    CRP 4.6 (H) 03/09/2018    RESR 68 (H) 03/09/2018    CREATININE 1.56 (H) 03/15/2018    GFRA 62 03/15/2018    GFRNA 54 03/15/2018        Culture results:  Specimen Description (no units)   Date Value   03/15/2018 URINE, CLEAN CATCH/MIDSTREAM   03/09/2018 URINE, CLEAN CATCH/MIDSTREAM   03/07/2018 URINE, CLEAN CATCH/MIDSTREAM   01/29/2018 URINE, CATHETERIZED, STRAIGHT    CULTURE (no units)   Date Value   03/15/2018 >100,000 CFU/mL ENTEROCOCCUS FAECALIS (P)   03/09/2018 NO GROWTH.   03/07/2018 NO GROWTH.   01/29/2018 >100,000 CFU/mL ENTEROCOCCUS FAECALIS (P)        ESR (mm/hr)   Date Value   03/09/2018 68 (H)   12/27/2017 49 (H)   11/28/2017 109 (H)     C-REACTIVE PROTEIN (mg/dL)   Date Value   03/09/2018 4.6 (H)   12/27/2017 0.4     Diagnostic Assessments Reviewed:  No new update       Nutritional Assessment:  Prealbumin and/or Albumin reviewed    Wound treatment goals are palliative:  No    DIAGNOSES:  Poorly controlled diabetes. Diabetic foot ulcer-left heel and left TMA. Compromise flaps of right second toe amputation site. Reportedly allergic to Viscopaste. Obesity. Renal insufficiency. Lower extremity edema - well controlled. See above     Medical Decision Making: Patient seen prior to HBO or Cici study. Compromised flaps-ischemic amputation site of right second toe. The surgical site of the right second toe appears w/o any ischemia or necrosis. Left foot TMA site and heel wound granular with callus that was debrided. Local wound care:  Please start Tamara  dressing to left foot wounds, change 3 times weekly. We will apply for theraskin. Off-loading: . Continue offloading heels while in the bed and recliner, use prevalon boots. Infectious disease:   No active sings of infection at wound sites. Surgical plan:  Status post left TMA and I&D by Dr. Ananya Arthur 11/25. Amputation of right D2 on on 3/6/18 by Dr. Michel. Diabetes/Nutrition:    Encourage tight glycemic control to promote wound healing. The last glycohemoglobin was 12.0% as of 11/22/17, he He follows with Dr. Varun Todd as outpatient. Encourage protein rich diet to promote wound healing. Last prealbumin was 8.6 on 11/14/17    Vascular:  Arterial ultrasound of left lower leg without significant stenosis, November 13, 2017. CICI study 3/21/18, see 's note. Nutrition:  - Continue to encourage protein rich diet to promote wound healing. Last prealbumin was 8.6 as of 11/14/17. Hyperbaric plan:  Patient to complete 10 HBO treatments. See corresponding HBO note for further progress and plan regarding concomitant HBO therapy. Last HBO 3/22/2018. Plan of Care:   Advanced Wound Care Recommendations: See above   Percent Wound Closure from consult: See wound measurements   Care plan to augment wound closure:Not applicable. Not applicable     Patient stable. All questions were answered.      Zarina Ding NP No Vaccines Administered.

## 2023-12-26 NOTE — PROGRESS NOTE ADULT - SUBJECTIVE AND OBJECTIVE BOX
INTERVAL EVENTS/SUBJ:  No events     Home Medications:  Albuterol (Eqv-Proventil HFA) 90 mcg/inh inhalation aerosol: 2 puff(s) inhaled every 12 hours (30 Jun 2020 06:28)  Aleve 220 mg oral tablet: 1 tab(s) orally every 8 hours, As Needed for mild to moderate pain (01 Jul 2020 13:32)  Dulera 50 mcg-5 mcg/inh inhalation aerosol: 2 puff(s) inhaled 2 times a day (30 Jun 2020 06:28)  famotidine 20 mg oral tablet: 1 tab(s) orally every 12 hours, As needed, Dyspepsia (01 Jul 2020 13:32)  multivitamin:  (30 Jun 2020 06:28)  Tylenol 500 mg oral tablet: 2 tab(s) orally every 8 hours as needed for mild to moderate pain (01 Jul 2020 13:32)  Vitamin B12:  (30 Jun 2020 06:28)  Zoloft 50 mg oral tablet: 1 tab(s) orally once a day (30 Jun 2020 06:28)      MEDICATIONS  (STANDING):  aspirin enteric coated 81 milliGRAM(s) Oral daily  atorvastatin 80 milliGRAM(s) Oral at bedtime  losartan 50 milliGRAM(s) Oral daily  metoprolol succinate ER 25 milliGRAM(s) Oral daily  ticagrelor 90 milliGRAM(s) Oral every 12 hours    MEDICATIONS  (PRN):  acetaminophen     Tablet .. 650 milliGRAM(s) Oral every 6 hours PRN Mild Pain (1 - 3)      Vital Signs Last 24 Hrs  T(C): 36.7 (26 Dec 2023 04:20), Max: 36.9 (25 Dec 2023 20:50)  T(F): 98 (26 Dec 2023 04:20), Max: 98.5 (25 Dec 2023 20:50)  HR: 75 (26 Dec 2023 04:20) (71 - 75)  BP: 124/74 (26 Dec 2023 04:20) (124/74 - 143/87)  BP(mean): --  RR: 18 (26 Dec 2023 04:20) (18 - 18)  SpO2: 95% (26 Dec 2023 04:20) (95% - 97%)    Parameters below as of 26 Dec 2023 04:20  Patient On (Oxygen Delivery Method): room air        REVIEW OF SYSTEMS:  As per HPI, otherwise unremarkable.     PHYSICAL EXAM:  Constitutional/Appearance: Normal, Well-developed  HEENT:   Normal oral mucosa, no drainage or redness, supple neck  Lymphatic: No lymphadenopathy  Cardiovascular: Normal S1 S2, No edema, II/VI SUHAIL  Respiratory: Lungs clear to auscultation, respirations non-labored  Psychiatry: A & O x 3, appropriate affect.   Gastrointestinal:  Soft, Non-tender, no distention  Skin: No rashes, No ecchymoses, No cyanosis	  Neurologic: Non-focal, Alert and oriented x 3  Extremities: Normal range of motion  Vascular: Peripheral pulses palpable 2+ bilaterally (radial)    LABS:  CBC Full  -  ( 26 Dec 2023 07:29 )  WBC Count : 10.50 K/uL  RBC Count : 4.86 M/uL  Hemoglobin : 14.4 g/dL  Hematocrit : 43.4 %  Platelet Count - Automated : 246 K/uL  Mean Cell Volume : 89.3 fl  Mean Cell Hemoglobin : 29.6 pg  Mean Cell Hemoglobin Concentration : 33.2 gm/dL  Auto Neutrophil # : x  Auto Lymphocyte # : x  Auto Monocyte # : x  Auto Eosinophil # : x  Auto Basophil # : x  Auto Neutrophil % : x  Auto Lymphocyte % : x  Auto Monocyte % : x  Auto Eosinophil % : x  Auto Basophil % : x      12-26    143  |  108  |  24<H>  ----------------------------<  114<H>  4.1   |  19<L>  |  0.95    Ca    9.7      26 Dec 2023 07:29            IMPRESSION AND PLAN: 67M w HTN, HL found w CP STEMI now s/p mLAD PCI.  -tele  -asa, ticagrelor, statin  -losartan and metoprolol  -rpt TTE pending  -pRCA 80%, small non-dominant disease, plan for discharge with outpt follow-up    ***    oJse Davis MD, MPhil, [facility]  Cardiologist, [facility]  ; [employer] at [facility]  email: [email or web address] Cardiology and Cardiovascular Surgery on-service contact/call information, go to [email or web address] and use [email or web address] to login.  Outpatient Cardiology appointments, call  [phone] to arrange with a colleague; I do not have outpatient Cardiology clinic.    INTERVAL EVENTS/SUBJ:  No events     Home Medications:  Albuterol (Eqv-Proventil HFA) 90 mcg/inh inhalation aerosol: 2 puff(s) inhaled every 12 hours (30 Jun 2020 06:28)  Aleve 220 mg oral tablet: 1 tab(s) orally every 8 hours, As Needed for mild to moderate pain (01 Jul 2020 13:32)  Dulera 50 mcg-5 mcg/inh inhalation aerosol: 2 puff(s) inhaled 2 times a day (30 Jun 2020 06:28)  famotidine 20 mg oral tablet: 1 tab(s) orally every 12 hours, As needed, Dyspepsia (01 Jul 2020 13:32)  multivitamin:  (30 Jun 2020 06:28)  Tylenol 500 mg oral tablet: 2 tab(s) orally every 8 hours as needed for mild to moderate pain (01 Jul 2020 13:32)  Vitamin B12:  (30 Jun 2020 06:28)  Zoloft 50 mg oral tablet: 1 tab(s) orally once a day (30 Jun 2020 06:28)      MEDICATIONS  (STANDING):  aspirin enteric coated 81 milliGRAM(s) Oral daily  atorvastatin 80 milliGRAM(s) Oral at bedtime  losartan 50 milliGRAM(s) Oral daily  metoprolol succinate ER 25 milliGRAM(s) Oral daily  ticagrelor 90 milliGRAM(s) Oral every 12 hours    MEDICATIONS  (PRN):  acetaminophen     Tablet .. 650 milliGRAM(s) Oral every 6 hours PRN Mild Pain (1 - 3)      Vital Signs Last 24 Hrs  T(C): 36.7 (26 Dec 2023 04:20), Max: 36.9 (25 Dec 2023 20:50)  T(F): 98 (26 Dec 2023 04:20), Max: 98.5 (25 Dec 2023 20:50)  HR: 75 (26 Dec 2023 04:20) (71 - 75)  BP: 124/74 (26 Dec 2023 04:20) (124/74 - 143/87)  BP(mean): --  RR: 18 (26 Dec 2023 04:20) (18 - 18)  SpO2: 95% (26 Dec 2023 04:20) (95% - 97%)    Parameters below as of 26 Dec 2023 04:20  Patient On (Oxygen Delivery Method): room air        REVIEW OF SYSTEMS:  As per HPI, otherwise unremarkable.     PHYSICAL EXAM:  Constitutional/Appearance: Normal, Well-developed  HEENT:   Normal oral mucosa, no drainage or redness, supple neck  Lymphatic: No lymphadenopathy  Cardiovascular: Normal S1 S2, No edema, II/VI SUHAIL  Respiratory: Lungs clear to auscultation, respirations non-labored  Psychiatry: A & O x 3, appropriate affect.   Gastrointestinal:  Soft, Non-tender, no distention  Skin: No rashes, No ecchymoses, No cyanosis	  Neurologic: Non-focal, Alert and oriented x 3  Extremities: Normal range of motion  Vascular: Peripheral pulses palpable 2+ bilaterally (radial)    LABS:  CBC Full  -  ( 26 Dec 2023 07:29 )  WBC Count : 10.50 K/uL  RBC Count : 4.86 M/uL  Hemoglobin : 14.4 g/dL  Hematocrit : 43.4 %  Platelet Count - Automated : 246 K/uL  Mean Cell Volume : 89.3 fl  Mean Cell Hemoglobin : 29.6 pg  Mean Cell Hemoglobin Concentration : 33.2 gm/dL  Auto Neutrophil # : x  Auto Lymphocyte # : x  Auto Monocyte # : x  Auto Eosinophil # : x  Auto Basophil # : x  Auto Neutrophil % : x  Auto Lymphocyte % : x  Auto Monocyte % : x  Auto Eosinophil % : x  Auto Basophil % : x      12-26    143  |  108  |  24<H>  ----------------------------<  114<H>  4.1   |  19<L>  |  0.95    Ca    9.7      26 Dec 2023 07:29            IMPRESSION AND PLAN: 67M w HTN, HL found w CP STEMI now s/p mLAD PCI.  -tele  -asa, ticagrelor, statin  -losartan and metoprolol  -rpt TTE pending  -pRCA 80%, small non-dominant disease, plan for discharge with outpt follow-up    ***    Jose Davis MD, MPhil, [facility]  Cardiologist, [facility]  ; [employer] at [facility]  email: [email or web address] [email or web address] Cardiovascular Surgery on-service contact/call information, go to [email or web address] and use [email or web address] to login.  Outpatient Cardiology appointments, call  [phone] to arrange with a colleague; I do not have outpatient Cardiology clinic.

## 2023-12-26 NOTE — CHART NOTE - NSCHARTNOTEFT_GEN_A_CORE
Brilinta copay for patient is $300/month. Interventional cardiology team discussed with Dr. Garrison. Patient to receive plavix load today 600 mg, then 75 mg daily thereafter per Dr. Garrison.

## 2023-12-26 NOTE — PROGRESS NOTE ADULT - PROBLEM SELECTOR PLAN 2
above goal at this time  - continue losartan 50mg daily  - started on Toprol XL 25mg daily
Better controlled  - continue losartan 50mg daily  - c/w Toprol XL 25mg daily

## 2023-12-27 ENCOUNTER — NON-APPOINTMENT (OUTPATIENT)
Age: 68
End: 2023-12-27

## 2023-12-27 RX ORDER — ROSUVASTATIN CALCIUM 5 MG/1
5 TABLET, FILM COATED ORAL
Qty: 30 | Refills: 0 | Status: DISCONTINUED | COMMUNITY
Start: 2021-01-29 | End: 2023-12-27

## 2023-12-28 ENCOUNTER — APPOINTMENT (OUTPATIENT)
Dept: INTERNAL MEDICINE | Facility: CLINIC | Age: 68
End: 2023-12-28
Payer: MEDICARE

## 2023-12-28 VITALS
OXYGEN SATURATION: 96 % | BODY MASS INDEX: 30.21 KG/M2 | TEMPERATURE: 97.6 F | RESPIRATION RATE: 16 BRPM | SYSTOLIC BLOOD PRESSURE: 121 MMHG | HEIGHT: 69 IN | HEART RATE: 78 BPM | WEIGHT: 204 LBS | DIASTOLIC BLOOD PRESSURE: 81 MMHG

## 2023-12-28 DIAGNOSIS — I21.02 ST ELEVATION (STEMI) MYOCARDIAL INFARCTION INVOLVING LEFT ANTERIOR DESCENDING CORONARY ARTERY: ICD-10-CM

## 2023-12-28 DIAGNOSIS — Z09 ENCOUNTER FOR FOLLOW-UP EXAMINATION AFTER COMPLETED TREATMENT FOR CONDITIONS OTHER THAN MALIGNANT NEOPLASM: ICD-10-CM

## 2023-12-28 PROCEDURE — 99496 TRANSJ CARE MGMT HIGH F2F 7D: CPT

## 2023-12-28 RX ORDER — ALPRAZOLAM 0.25 MG/1
0.25 TABLET ORAL DAILY
Qty: 20 | Refills: 0 | Status: ACTIVE | COMMUNITY
Start: 2023-12-28 | End: 1900-01-01

## 2023-12-28 NOTE — PLAN
[FreeTextEntry1] : Patient will be starting cardiac rehab Patient will be seeing his cardiologist January 8 for follow-up Reviewed medications with patient Reviewed dietary changes with patient and Patient will start low-dose Zoloft 25 mg daily to further help with his anxiety and will also be giving Xanax for as needed use for breakthrough anxiety Patient will follow-up again with me in 1 to 2 months with his fasting blood work

## 2023-12-28 NOTE — HISTORY OF PRESENT ILLNESS
[Post-hospitalization from ___ Hospital] : Post-hospitalization from [unfilled] Hospital [Admitted on: ___] : The patient was admitted on [unfilled] [Discharged on ___] : discharged on [unfilled] [Discharge Summary] : discharge summary [Discharge Med List] : discharge medication list [Patient Contacted By: ____] : and contacted by [unfilled] [FreeTextEntry2] : Patient was admitted to [facility] on 12/24/2023.   Patient was discharged to home on 12/26/2023.   Discharge diagnosis: STEMI (ST elevation myocardial infarction) one stent placed to the left anterior.     Hospital Course: Hospital Course HPI: 67-year-old male with past medical history of hypertension, hyperlipidemia presenting with chest pain. Patient reports that he has had intermittent episodes of chest pain over the last 10 days. Chest pain seems to be worse with exertion. Patient was seen at outside hospital yesterday ruled out for MI and discharged home. Patient presenting today with recurrent symptoms. Patient had a plain exercise stress test in May 2023 that was unremarkable (not nuclear). Non-smoker. Family history of CHF and his father passed away in his 60s. Unclear cause of heart failure. Upon my evaluation currently patient is asymptomatic. Denies fever/chills, shortness of breath, abdominal pain, nausea, vomiting, diarrhea, dysuria. In the ED VSS. Trop 44. CXR clear. Plan to observe in the CDU on tele overnight for TTE and Nuc stress Hospital Course: Abnormal stress 12/24. s/p LHC x1 ANAYELI mLAD 90%. DAPT for 1 year. Echo 12/24  start plavix  per Dr Garrison. Echo 12/24 poor quality, repeat 12/26 WNL,  EF 71%. Important Medication Changes and Reason: ASA 81 mg daily - DAPT Plavix 75 mg daily - DAPT Losartan 50 mg daily Atorvastatin 80 mg daily  S/P cardiac catheterization 12/24/23  1 drug-elluding stent (ANAYELI) placed to LAD; 90% stenosis  Please take aspirin 81 mg daily and plavix 75 mg daily  Patient has been having increased anxiety with dealing with issues with his mother currently in a nursing home Patient struggled with his anxiety for several years now and with his recent cardiac event anxiety has now increased more Have tried SSRI medications in the past but never gave it a full try as patient did not want to take the medication and stated that he did not feel that well on the medication

## 2023-12-28 NOTE — PHYSICAL EXAM
[Normal] : affect was normal and insight and judgment were intact [de-identified] : Bruising present right groin no hematoma present no tenderness no signs of infection

## 2024-01-08 ENCOUNTER — APPOINTMENT (OUTPATIENT)
Dept: CARDIOLOGY | Facility: CLINIC | Age: 69
End: 2024-01-08
Payer: MEDICARE

## 2024-01-08 VITALS
OXYGEN SATURATION: 97 % | HEIGHT: 69 IN | SYSTOLIC BLOOD PRESSURE: 148 MMHG | BODY MASS INDEX: 29.92 KG/M2 | WEIGHT: 202 LBS | HEART RATE: 64 BPM | DIASTOLIC BLOOD PRESSURE: 87 MMHG

## 2024-01-08 DIAGNOSIS — R07.89 OTHER CHEST PAIN: ICD-10-CM

## 2024-01-08 PROCEDURE — 99214 OFFICE O/P EST MOD 30 MIN: CPT | Mod: 25

## 2024-01-08 PROCEDURE — 93000 ELECTROCARDIOGRAM COMPLETE: CPT

## 2024-01-22 RX ORDER — ATORVASTATIN CALCIUM 80 MG/1
80 TABLET, FILM COATED ORAL
Qty: 90 | Refills: 3 | Status: ACTIVE | COMMUNITY
Start: 2023-12-27

## 2024-01-22 NOTE — CHART NOTE - NSCHARTNOTESELECT_GEN_ALL_CORE
CICU Accept Note/Event Note
CICU Transfer Note/Transfer Note
Plavix
D/C appt/Event Note
Unstable Angina/Event Note

## 2024-01-23 LAB
PSA FREE FLD-MCNC: NORMAL %
PSA FREE SERPL-MCNC: <0.01 NG/ML
PSA SERPL-MCNC: <0.01 NG/ML

## 2024-01-26 RX ORDER — TICAGRELOR 90 MG/1
1 TABLET ORAL
Qty: 180 | Refills: 3
Start: 2024-01-26 | End: 2025-01-19

## 2024-02-15 ENCOUNTER — APPOINTMENT (OUTPATIENT)
Dept: INTERNAL MEDICINE | Facility: CLINIC | Age: 69
End: 2024-02-15
Payer: MEDICARE

## 2024-02-15 VITALS
TEMPERATURE: 98.2 F | OXYGEN SATURATION: 96 % | SYSTOLIC BLOOD PRESSURE: 138 MMHG | DIASTOLIC BLOOD PRESSURE: 84 MMHG | BODY MASS INDEX: 30.21 KG/M2 | WEIGHT: 204 LBS | HEART RATE: 72 BPM | HEIGHT: 69 IN

## 2024-02-15 DIAGNOSIS — I10 ESSENTIAL (PRIMARY) HYPERTENSION: ICD-10-CM

## 2024-02-15 DIAGNOSIS — F41.9 ANXIETY DISORDER, UNSPECIFIED: ICD-10-CM

## 2024-02-15 PROCEDURE — 99214 OFFICE O/P EST MOD 30 MIN: CPT

## 2024-02-15 PROCEDURE — G2211 COMPLEX E/M VISIT ADD ON: CPT

## 2024-02-15 RX ORDER — SERTRALINE HYDROCHLORIDE 50 MG/1
50 TABLET, FILM COATED ORAL
Qty: 90 | Refills: 1 | Status: ACTIVE | COMMUNITY
Start: 2023-12-28 | End: 1900-01-01

## 2024-02-15 NOTE — HISTORY OF PRESENT ILLNESS
[FreeTextEntry1] : 67 y/o male presents to the office today for a 1 month f/u visit. [de-identified] : Doing Cardiac rehab 3 times a week now  Pts home scale since in hospital to at home has been 8lb. Sleeping has improved. Stress and anxiety improving. Pt has been feeling about 30% better since starting Zoloft.

## 2024-02-15 NOTE — PLAN
[FreeTextEntry1] : Recommend patient increasing his Zoloft from 25 mg to 50 mg daily Patient will continue with his current medications Patient is going to continue with cardiac rehab 3 times a week Patient has improved diet and overall increased activity level Sleeping habits have improved Still too early to repeat fasting blood work recommend patient come back to the office in March or April to repeat his fasting labs

## 2024-03-01 RX ORDER — CLOPIDOGREL BISULFATE 75 MG/1
75 TABLET, FILM COATED ORAL
Qty: 90 | Refills: 3 | Status: ACTIVE | COMMUNITY
Start: 2023-12-27

## 2024-03-11 NOTE — DISCHARGE NOTE PROVIDER - CARE PROVIDER_API CALL
Jero Abbott  UROLOGY  [address] [address] [place], [place] [id number]  Phone: [phone]  Fax: [phone]  Scheduled Appointment: 07/08/2020
pacu then home

## 2024-04-15 LAB — PSA SERPL-MCNC: <0.01 NG/ML

## 2024-04-16 ENCOUNTER — APPOINTMENT (OUTPATIENT)
Dept: CARDIOLOGY | Facility: CLINIC | Age: 69
End: 2024-04-16
Payer: MEDICARE

## 2024-04-16 ENCOUNTER — NON-APPOINTMENT (OUTPATIENT)
Age: 69
End: 2024-04-16

## 2024-04-16 VITALS
DIASTOLIC BLOOD PRESSURE: 86 MMHG | OXYGEN SATURATION: 96 % | HEIGHT: 69 IN | WEIGHT: 202 LBS | HEART RATE: 64 BPM | SYSTOLIC BLOOD PRESSURE: 154 MMHG | BODY MASS INDEX: 29.92 KG/M2

## 2024-04-16 VITALS — SYSTOLIC BLOOD PRESSURE: 132 MMHG | DIASTOLIC BLOOD PRESSURE: 82 MMHG

## 2024-04-16 DIAGNOSIS — I25.10 ATHEROSCLEROTIC HEART DISEASE OF NATIVE CORONARY ARTERY W/OUT ANGINA PECTORIS: ICD-10-CM

## 2024-04-16 PROCEDURE — 99214 OFFICE O/P EST MOD 30 MIN: CPT | Mod: 25

## 2024-04-16 PROCEDURE — 93000 ELECTROCARDIOGRAM COMPLETE: CPT

## 2024-04-16 NOTE — DISCUSSION/SUMMARY
[FreeTextEntry1] : Grover recently presented with chest pain, and EKG changes, and is status post cath with PCI to his mid LAD.  He feels well today, without worrisome symptoms and is doing cardiac rehab without issue.  His LV function was normal on recent echocardiogram.  His blood pressure is elevated, though his wife reports better numbers at home and at rehab.  His physical exam is unremarkable.  His EKG has normalized.  He will continue aspirin, Plavix, and a high-dose statin.  He will remain on losartan 50 for now, and his wife will monitor his blood pressure at home.  He will start to be more active and eat right. We will repeat his BW.  If no issues, I will see him again in 6 months. [EKG obtained to assist in diagnosis and management of assessed problem(s)] : EKG obtained to assist in diagnosis and management of assessed problem(s)

## 2024-04-16 NOTE — HISTORY OF PRESENT ILLNESS
[FreeTextEntry1] : Grover is a 68-year-old male here for follow-up.  He has a history of hypertension, hyperlipidemia, and anxiety.  He presented to the hospital on 12/24/2023 with chest pain.  He has a family history of CAD DVT/CHF in his father.  After an abnormal stress test, followed by chest pain and EKG changes, he required left heart catheterization with a single stent to his mid LAD.  Remainder of his CAD was distal and diffuse, without need for intervention. His echocardiogram demonstrated normal LV function, without significant valvular pathology.  I last saw him 1/24. Overall, he feels well.  He is doing cardiac rehab 3x/week. He has no chest pain, difficulty breathing, or palpitations.  He also denies lower extremity swelling, orthopnea, PND, dizziness, lightheadedness, and near syncope.

## 2024-04-18 ENCOUNTER — RX RENEWAL (OUTPATIENT)
Age: 69
End: 2024-04-18

## 2024-04-18 RX ORDER — LOSARTAN POTASSIUM 50 MG/1
50 TABLET, FILM COATED ORAL
Qty: 90 | Refills: 0 | Status: ACTIVE | COMMUNITY
Start: 2022-02-23 | End: 1900-01-01

## 2024-04-23 LAB
25(OH)D3 SERPL-MCNC: 32.7 NG/ML
ALBUMIN SERPL ELPH-MCNC: 4.9 G/DL
ALP BLD-CCNC: 89 U/L
ALT SERPL-CCNC: 27 U/L
ANION GAP SERPL CALC-SCNC: 13 MMOL/L
APPEARANCE: CLEAR
AST SERPL-CCNC: 21 U/L
BACTERIA: NEGATIVE /HPF
BASOPHILS # BLD AUTO: 0.03 K/UL
BASOPHILS NFR BLD AUTO: 0.4 %
BILIRUB SERPL-MCNC: 0.8 MG/DL
BILIRUBIN URINE: NEGATIVE
BLOOD URINE: NEGATIVE
BUN SERPL-MCNC: 23 MG/DL
CALCIUM SERPL-MCNC: 9.8 MG/DL
CAST: 0 /LPF
CHLORIDE SERPL-SCNC: 107 MMOL/L
CHOLEST SERPL-MCNC: 132 MG/DL
CO2 SERPL-SCNC: 22 MMOL/L
COLOR: YELLOW
CREAT SERPL-MCNC: 0.87 MG/DL
EGFR: 93 ML/MIN/1.73M2
EOSINOPHIL # BLD AUTO: 0.2 K/UL
EOSINOPHIL NFR BLD AUTO: 3 %
EPITHELIAL CELLS: 0 /HPF
ESTIMATED AVERAGE GLUCOSE: 128 MG/DL
GLUCOSE QUALITATIVE U: NEGATIVE MG/DL
GLUCOSE SERPL-MCNC: 112 MG/DL
HBA1C MFR BLD HPLC: 6.1 %
HCT VFR BLD CALC: 45.1 %
HDLC SERPL-MCNC: 52 MG/DL
HGB BLD-MCNC: 14.9 G/DL
IMM GRANULOCYTES NFR BLD AUTO: 0.3 %
KETONES URINE: NEGATIVE MG/DL
LDLC SERPL CALC-MCNC: 56 MG/DL
LEUKOCYTE ESTERASE URINE: NEGATIVE
LYMPHOCYTES # BLD AUTO: 1.35 K/UL
LYMPHOCYTES NFR BLD AUTO: 20.1 %
MAN DIFF?: NORMAL
MCHC RBC-ENTMCNC: 30 PG
MCHC RBC-ENTMCNC: 33 GM/DL
MCV RBC AUTO: 90.7 FL
MICROSCOPIC-UA: NORMAL
MONOCYTES # BLD AUTO: 0.7 K/UL
MONOCYTES NFR BLD AUTO: 10.4 %
NEUTROPHILS # BLD AUTO: 4.42 K/UL
NEUTROPHILS NFR BLD AUTO: 65.8 %
NITRITE URINE: NEGATIVE
NONHDLC SERPL-MCNC: 80 MG/DL
PH URINE: 5.5
PLATELET # BLD AUTO: 259 K/UL
POTASSIUM SERPL-SCNC: 4.6 MMOL/L
PROT SERPL-MCNC: 7 G/DL
PROTEIN URINE: NEGATIVE MG/DL
RBC # BLD: 4.97 M/UL
RBC # FLD: 13.2 %
RED BLOOD CELLS URINE: 1 /HPF
SODIUM SERPL-SCNC: 143 MMOL/L
SPECIFIC GRAVITY URINE: 1.02
TRIGL SERPL-MCNC: 134 MG/DL
TSH SERPL-ACNC: 2.99 UIU/ML
UROBILINOGEN URINE: 0.2 MG/DL
WBC # FLD AUTO: 6.72 K/UL
WHITE BLOOD CELLS URINE: 0 /HPF

## 2024-04-24 ENCOUNTER — APPOINTMENT (OUTPATIENT)
Dept: UROLOGY | Facility: CLINIC | Age: 69
End: 2024-04-24
Payer: MEDICARE

## 2024-04-24 VITALS
HEART RATE: 68 BPM | SYSTOLIC BLOOD PRESSURE: 134 MMHG | HEIGHT: 69 IN | RESPIRATION RATE: 16 BRPM | DIASTOLIC BLOOD PRESSURE: 74 MMHG | OXYGEN SATURATION: 96 % | WEIGHT: 195 LBS | BODY MASS INDEX: 28.88 KG/M2

## 2024-04-24 PROCEDURE — 99213 OFFICE O/P EST LOW 20 MIN: CPT

## 2024-04-24 NOTE — HISTORY OF PRESENT ILLNESS
[FreeTextEntry1] : 69 yo presents today for a follow-up appointment s/p RALP 6/2020 Recent PSA <0.01 (1.13.24.) Pt. is doing well overall. Had a cardiac stent placed in his LAD early 2024. Undergoing cardiac rehab.  Pt. is urinating well. No acute issues.

## 2024-04-24 NOTE — ASSESSMENT
[FreeTextEntry1] : PSA remains undetectable.  Doing well overall.  Repeat PSA in 6 months.  Next follow-up in 6 months. 20 min discussion for prostate cancer followup

## 2024-04-25 ENCOUNTER — NON-APPOINTMENT (OUTPATIENT)
Age: 69
End: 2024-04-25

## 2024-04-25 DIAGNOSIS — J45.40 MODERATE PERSISTENT ASTHMA, UNCOMPLICATED: ICD-10-CM

## 2024-04-25 RX ORDER — ROSUVASTATIN CALCIUM 5 MG/1
5 TABLET, FILM COATED ORAL
Refills: 0 | Status: ACTIVE | COMMUNITY

## 2024-07-01 ENCOUNTER — RX RENEWAL (OUTPATIENT)
Age: 69
End: 2024-07-01

## 2024-07-12 ENCOUNTER — RX RENEWAL (OUTPATIENT)
Age: 69
End: 2024-07-12

## 2024-08-12 ENCOUNTER — RX RENEWAL (OUTPATIENT)
Age: 69
End: 2024-08-12

## 2024-09-29 ENCOUNTER — NON-APPOINTMENT (OUTPATIENT)
Age: 69
End: 2024-09-29

## 2024-10-04 ENCOUNTER — RX RENEWAL (OUTPATIENT)
Age: 69
End: 2024-10-04

## 2024-10-07 ENCOUNTER — RX RENEWAL (OUTPATIENT)
Age: 69
End: 2024-10-07

## 2024-10-10 ENCOUNTER — APPOINTMENT (OUTPATIENT)
Dept: PEDIATRIC ALLERGY IMMUNOLOGY | Facility: CLINIC | Age: 69
End: 2024-10-10

## 2024-10-10 DIAGNOSIS — J01.90 ACUTE SINUSITIS, UNSPECIFIED: ICD-10-CM

## 2024-10-10 PROCEDURE — 99204 OFFICE O/P NEW MOD 45 MIN: CPT | Mod: 25

## 2024-10-10 PROCEDURE — 94010 BREATHING CAPACITY TEST: CPT

## 2024-10-10 RX ORDER — AMOXICILLIN AND CLAVULANATE POTASSIUM 875; 125 MG/1; MG/1
875-125 TABLET, COATED ORAL TWICE DAILY
Qty: 14 | Refills: 0 | Status: ACTIVE | COMMUNITY
Start: 2024-10-10 | End: 1900-01-01

## 2024-10-22 ENCOUNTER — APPOINTMENT (OUTPATIENT)
Dept: CARDIOLOGY | Facility: CLINIC | Age: 69
End: 2024-10-22
Payer: MEDICARE

## 2024-10-22 ENCOUNTER — NON-APPOINTMENT (OUTPATIENT)
Age: 69
End: 2024-10-22

## 2024-10-22 VITALS
SYSTOLIC BLOOD PRESSURE: 138 MMHG | DIASTOLIC BLOOD PRESSURE: 88 MMHG | WEIGHT: 210 LBS | HEART RATE: 61 BPM | HEIGHT: 69 IN | BODY MASS INDEX: 31.1 KG/M2 | OXYGEN SATURATION: 94 %

## 2024-10-22 VITALS — DIASTOLIC BLOOD PRESSURE: 85 MMHG | SYSTOLIC BLOOD PRESSURE: 130 MMHG

## 2024-10-22 DIAGNOSIS — R94.31 ABNORMAL ELECTROCARDIOGRAM [ECG] [EKG]: ICD-10-CM

## 2024-10-22 DIAGNOSIS — I65.29 OCCLUSION AND STENOSIS OF UNSPECIFIED CAROTID ARTERY: ICD-10-CM

## 2024-10-22 PROCEDURE — 93000 ELECTROCARDIOGRAM COMPLETE: CPT

## 2024-10-22 PROCEDURE — 99214 OFFICE O/P EST MOD 30 MIN: CPT | Mod: 25

## 2024-10-23 ENCOUNTER — APPOINTMENT (OUTPATIENT)
Dept: UROLOGY | Facility: CLINIC | Age: 69
End: 2024-10-23

## 2024-10-23 DIAGNOSIS — I25.10 ATHEROSCLEROTIC HEART DISEASE OF NATIVE CORONARY ARTERY W/OUT ANGINA PECTORIS: ICD-10-CM

## 2024-10-24 DIAGNOSIS — E55.9 VITAMIN D DEFICIENCY, UNSPECIFIED: ICD-10-CM

## 2024-10-24 DIAGNOSIS — I10 ESSENTIAL (PRIMARY) HYPERTENSION: ICD-10-CM

## 2024-10-24 DIAGNOSIS — R97.20 ELEVATED PROSTATE, SPECIFIC ANTIGEN [PSA]: ICD-10-CM

## 2024-10-24 DIAGNOSIS — R73.09 OTHER ABNORMAL GLUCOSE: ICD-10-CM

## 2024-10-24 DIAGNOSIS — E53.8 DEFICIENCY OF OTHER SPECIFIED B GROUP VITAMINS: ICD-10-CM

## 2024-10-24 DIAGNOSIS — E78.00 PURE HYPERCHOLESTEROLEMIA, UNSPECIFIED: ICD-10-CM

## 2024-10-29 ENCOUNTER — APPOINTMENT (OUTPATIENT)
Dept: CARDIOLOGY | Facility: CLINIC | Age: 69
End: 2024-10-29
Payer: MEDICARE

## 2024-10-29 PROCEDURE — 93306 TTE W/DOPPLER COMPLETE: CPT

## 2024-10-29 PROCEDURE — 93880 EXTRACRANIAL BILAT STUDY: CPT

## 2024-10-31 ENCOUNTER — APPOINTMENT (OUTPATIENT)
Dept: INTERNAL MEDICINE | Facility: CLINIC | Age: 69
End: 2024-10-31
Payer: MEDICARE

## 2024-10-31 VITALS
HEIGHT: 69 IN | DIASTOLIC BLOOD PRESSURE: 90 MMHG | BODY MASS INDEX: 31.25 KG/M2 | OXYGEN SATURATION: 95 % | SYSTOLIC BLOOD PRESSURE: 146 MMHG | RESPIRATION RATE: 16 BRPM | HEART RATE: 76 BPM | WEIGHT: 211 LBS

## 2024-10-31 DIAGNOSIS — Z00.00 ENCOUNTER FOR GENERAL ADULT MEDICAL EXAMINATION W/OUT ABNORMAL FINDINGS: ICD-10-CM

## 2024-10-31 PROCEDURE — G0439: CPT

## 2024-11-01 LAB
25(OH)D3 SERPL-MCNC: 32.7 NG/ML
ALBUMIN SERPL ELPH-MCNC: 4.6 G/DL
ALP BLD-CCNC: 88 U/L
ALT SERPL-CCNC: 25 U/L
ANION GAP SERPL CALC-SCNC: 11 MMOL/L
APPEARANCE: CLEAR
AST SERPL-CCNC: 22 U/L
BACTERIA: NEGATIVE /HPF
BASOPHILS # BLD AUTO: 0.02 K/UL
BASOPHILS NFR BLD AUTO: 0.3 %
BILIRUB SERPL-MCNC: 0.7 MG/DL
BILIRUBIN URINE: NEGATIVE
BLOOD URINE: NEGATIVE
BUN SERPL-MCNC: 22 MG/DL
CALCIUM SERPL-MCNC: 9.9 MG/DL
CAST: 2 /LPF
CHLORIDE SERPL-SCNC: 106 MMOL/L
CHOLEST SERPL-MCNC: 163 MG/DL
CO2 SERPL-SCNC: 27 MMOL/L
COLOR: YELLOW
CREAT SERPL-MCNC: 0.9 MG/DL
EGFR: 92 ML/MIN/1.73M2
EOSINOPHIL # BLD AUTO: 0.13 K/UL
EOSINOPHIL NFR BLD AUTO: 1.8 %
EPITHELIAL CELLS: 0 /HPF
ESTIMATED AVERAGE GLUCOSE: 131 MG/DL
GLUCOSE QUALITATIVE U: NEGATIVE MG/DL
GLUCOSE SERPL-MCNC: 107 MG/DL
HBA1C MFR BLD HPLC: 6.2 %
HCT VFR BLD CALC: 46.8 %
HDLC SERPL-MCNC: 53 MG/DL
HGB BLD-MCNC: 14.9 G/DL
IMM GRANULOCYTES NFR BLD AUTO: 0.6 %
KETONES URINE: NEGATIVE MG/DL
LDLC SERPL CALC-MCNC: 79 MG/DL
LEUKOCYTE ESTERASE URINE: NEGATIVE
LYMPHOCYTES # BLD AUTO: 1.18 K/UL
LYMPHOCYTES NFR BLD AUTO: 16.5 %
MAN DIFF?: NORMAL
MCHC RBC-ENTMCNC: 29.1 PG
MCHC RBC-ENTMCNC: 31.8 GM/DL
MCV RBC AUTO: 91.4 FL
MICROSCOPIC-UA: NORMAL
MONOCYTES # BLD AUTO: 0.73 K/UL
MONOCYTES NFR BLD AUTO: 10.2 %
NEUTROPHILS # BLD AUTO: 5.06 K/UL
NEUTROPHILS NFR BLD AUTO: 70.6 %
NITRITE URINE: NEGATIVE
NONHDLC SERPL-MCNC: 109 MG/DL
PH URINE: 5.5
PLATELET # BLD AUTO: 260 K/UL
POTASSIUM SERPL-SCNC: 4.7 MMOL/L
PROT SERPL-MCNC: 7 G/DL
PROTEIN URINE: NEGATIVE MG/DL
RBC # BLD: 5.12 M/UL
RBC # FLD: 13.2 %
RED BLOOD CELLS URINE: 1 /HPF
SODIUM SERPL-SCNC: 145 MMOL/L
SPECIFIC GRAVITY URINE: 1.02
T4 FREE SERPL-MCNC: 1.2 NG/DL
TRIGL SERPL-MCNC: 177 MG/DL
TSH SERPL-ACNC: 3.16 UIU/ML
UROBILINOGEN URINE: 0.2 MG/DL
VIT B12 SERPL-MCNC: 1120 PG/ML
WBC # FLD AUTO: 7.16 K/UL
WHITE BLOOD CELLS URINE: 0 /HPF

## 2024-11-07 ENCOUNTER — RX RENEWAL (OUTPATIENT)
Age: 69
End: 2024-11-07

## 2024-11-27 ENCOUNTER — APPOINTMENT (OUTPATIENT)
Dept: UROLOGY | Facility: CLINIC | Age: 69
End: 2024-11-27
Payer: MEDICARE

## 2024-11-27 VITALS
WEIGHT: 203 LBS | OXYGEN SATURATION: 95 % | DIASTOLIC BLOOD PRESSURE: 79 MMHG | HEIGHT: 69 IN | BODY MASS INDEX: 30.07 KG/M2 | HEART RATE: 70 BPM | SYSTOLIC BLOOD PRESSURE: 137 MMHG | RESPIRATION RATE: 16 BRPM

## 2024-11-27 DIAGNOSIS — C61 MALIGNANT NEOPLASM OF PROSTATE: ICD-10-CM

## 2024-11-27 PROCEDURE — 99213 OFFICE O/P EST LOW 20 MIN: CPT

## 2024-11-29 ENCOUNTER — RX RENEWAL (OUTPATIENT)
Age: 69
End: 2024-11-29

## 2024-11-29 RX ORDER — BUDESONIDE AND FORMOTEROL FUMARATE DIHYDRATE 160; 4.5 UG/1; UG/1
160-4.5 AEROSOL RESPIRATORY (INHALATION)
Qty: 3 | Refills: 0 | Status: ACTIVE | COMMUNITY
Start: 2024-11-29 | End: 1900-01-01

## 2024-12-05 ENCOUNTER — APPOINTMENT (OUTPATIENT)
Dept: ORTHOPEDIC SURGERY | Facility: CLINIC | Age: 69
End: 2024-12-05
Payer: MEDICARE

## 2024-12-05 DIAGNOSIS — M17.11 UNILATERAL PRIMARY OSTEOARTHRITIS, RIGHT KNEE: ICD-10-CM

## 2024-12-05 PROCEDURE — 73562 X-RAY EXAM OF KNEE 3: CPT | Mod: RT

## 2024-12-05 PROCEDURE — 99204 OFFICE O/P NEW MOD 45 MIN: CPT | Mod: 25

## 2024-12-05 PROCEDURE — 20610 DRAIN/INJ JOINT/BURSA W/O US: CPT | Mod: RT

## 2025-01-01 ENCOUNTER — RX RENEWAL (OUTPATIENT)
Age: 70
End: 2025-01-01

## 2025-01-02 PROBLEM — R09.89 CHEST CONGESTION: Status: RESOLVED | Noted: 2018-11-21 | Resolved: 2025-01-02

## 2025-02-08 ENCOUNTER — RX RENEWAL (OUTPATIENT)
Age: 70
End: 2025-02-08

## 2025-02-11 RX ORDER — ALBUTEROL SULFATE 90 UG/1
108 (90 BASE) INHALANT RESPIRATORY (INHALATION) EVERY 4 HOURS
Qty: 3 | Refills: 0 | Status: ACTIVE | COMMUNITY
Start: 2025-02-11 | End: 1900-01-01

## 2025-02-24 ENCOUNTER — RX RENEWAL (OUTPATIENT)
Age: 70
End: 2025-02-24

## 2025-03-12 NOTE — PLAN
[FreeTextEntry1] : Pt will continue with current medications\par Continue to improve diet and exercise\par FBW done in office today\par Follow up again with me in 4-5 months or before then if needed 
(4) rarely moist

## 2025-03-27 ENCOUNTER — APPOINTMENT (OUTPATIENT)
Dept: PEDIATRIC ALLERGY IMMUNOLOGY | Facility: CLINIC | Age: 70
End: 2025-03-27
Payer: MEDICARE

## 2025-03-27 DIAGNOSIS — J32.9 CHRONIC SINUSITIS, UNSPECIFIED: ICD-10-CM

## 2025-03-27 DIAGNOSIS — R05.9 COUGH, UNSPECIFIED: ICD-10-CM

## 2025-03-27 DIAGNOSIS — R09.82 POSTNASAL DRIP: ICD-10-CM

## 2025-03-27 DIAGNOSIS — J45.40 MODERATE PERSISTENT ASTHMA, UNCOMPLICATED: ICD-10-CM

## 2025-03-27 PROCEDURE — 99213 OFFICE O/P EST LOW 20 MIN: CPT | Mod: 25

## 2025-03-27 PROCEDURE — 94010 BREATHING CAPACITY TEST: CPT

## 2025-03-27 RX ORDER — PREDNISONE 20 MG/1
20 TABLET ORAL DAILY
Qty: 8 | Refills: 0 | Status: ACTIVE | COMMUNITY
Start: 2025-03-27 | End: 1900-01-01

## 2025-03-27 RX ORDER — AMOXICILLIN AND CLAVULANATE POTASSIUM 875; 125 MG/1; MG/1
875-125 TABLET, COATED ORAL
Qty: 20 | Refills: 0 | Status: ACTIVE | COMMUNITY
Start: 2025-03-27 | End: 1900-01-01

## 2025-03-28 ENCOUNTER — RX RENEWAL (OUTPATIENT)
Age: 70
End: 2025-03-28

## 2025-04-10 RX ORDER — FEXOFENADINE HCL 180 MG/1
180 TABLET, FILM COATED ORAL DAILY
Qty: 30 | Refills: 2 | Status: ACTIVE | COMMUNITY
Start: 2025-04-10 | End: 1900-01-01

## 2025-04-22 ENCOUNTER — APPOINTMENT (OUTPATIENT)
Dept: CARDIOLOGY | Facility: CLINIC | Age: 70
End: 2025-04-22
Payer: MEDICARE

## 2025-04-22 ENCOUNTER — APPOINTMENT (OUTPATIENT)
Dept: INTERNAL MEDICINE | Facility: CLINIC | Age: 70
End: 2025-04-22

## 2025-04-22 ENCOUNTER — NON-APPOINTMENT (OUTPATIENT)
Age: 70
End: 2025-04-22

## 2025-04-22 VITALS — SYSTOLIC BLOOD PRESSURE: 138 MMHG | DIASTOLIC BLOOD PRESSURE: 82 MMHG

## 2025-04-22 VITALS
OXYGEN SATURATION: 97 % | WEIGHT: 218 LBS | HEART RATE: 68 BPM | BODY MASS INDEX: 32.29 KG/M2 | SYSTOLIC BLOOD PRESSURE: 148 MMHG | DIASTOLIC BLOOD PRESSURE: 90 MMHG | HEIGHT: 69 IN

## 2025-04-22 DIAGNOSIS — I25.10 ATHEROSCLEROTIC HEART DISEASE OF NATIVE CORONARY ARTERY W/OUT ANGINA PECTORIS: ICD-10-CM

## 2025-04-22 DIAGNOSIS — R06.00 DYSPNEA, UNSPECIFIED: ICD-10-CM

## 2025-04-22 PROCEDURE — 93000 ELECTROCARDIOGRAM COMPLETE: CPT

## 2025-04-22 PROCEDURE — 99214 OFFICE O/P EST MOD 30 MIN: CPT | Mod: 25

## 2025-05-08 DIAGNOSIS — E78.00 PURE HYPERCHOLESTEROLEMIA, UNSPECIFIED: ICD-10-CM

## 2025-05-08 DIAGNOSIS — Z00.00 ENCOUNTER FOR GENERAL ADULT MEDICAL EXAMINATION W/OUT ABNORMAL FINDINGS: ICD-10-CM

## 2025-05-08 DIAGNOSIS — E55.9 VITAMIN D DEFICIENCY, UNSPECIFIED: ICD-10-CM

## 2025-05-08 DIAGNOSIS — I10 ESSENTIAL (PRIMARY) HYPERTENSION: ICD-10-CM

## 2025-05-08 DIAGNOSIS — R73.09 OTHER ABNORMAL GLUCOSE: ICD-10-CM

## 2025-05-08 DIAGNOSIS — E53.8 DEFICIENCY OF OTHER SPECIFIED B GROUP VITAMINS: ICD-10-CM

## 2025-05-09 ENCOUNTER — RX RENEWAL (OUTPATIENT)
Age: 70
End: 2025-05-09

## 2025-05-29 ENCOUNTER — APPOINTMENT (OUTPATIENT)
Dept: INTERNAL MEDICINE | Facility: CLINIC | Age: 70
End: 2025-05-29
Payer: MEDICARE

## 2025-05-29 VITALS
HEART RATE: 67 BPM | OXYGEN SATURATION: 96 % | WEIGHT: 206 LBS | RESPIRATION RATE: 16 BRPM | SYSTOLIC BLOOD PRESSURE: 134 MMHG | TEMPERATURE: 98.1 F | HEIGHT: 69 IN | DIASTOLIC BLOOD PRESSURE: 78 MMHG | BODY MASS INDEX: 30.51 KG/M2

## 2025-05-29 DIAGNOSIS — R73.09 OTHER ABNORMAL GLUCOSE: ICD-10-CM

## 2025-05-29 DIAGNOSIS — I25.10 ATHEROSCLEROTIC HEART DISEASE OF NATIVE CORONARY ARTERY W/OUT ANGINA PECTORIS: ICD-10-CM

## 2025-05-29 DIAGNOSIS — Z00.00 ENCOUNTER FOR GENERAL ADULT MEDICAL EXAMINATION W/OUT ABNORMAL FINDINGS: ICD-10-CM

## 2025-05-29 PROCEDURE — G0439: CPT

## 2025-06-02 ENCOUNTER — RX RENEWAL (OUTPATIENT)
Age: 70
End: 2025-06-02

## 2025-06-04 ENCOUNTER — APPOINTMENT (OUTPATIENT)
Dept: UROLOGY | Facility: CLINIC | Age: 70
End: 2025-06-04

## 2025-06-12 ENCOUNTER — NON-APPOINTMENT (OUTPATIENT)
Age: 70
End: 2025-06-12

## 2025-07-07 ENCOUNTER — RX RENEWAL (OUTPATIENT)
Age: 70
End: 2025-07-07

## 2025-08-05 DIAGNOSIS — Z12.5 ENCOUNTER FOR SCREENING FOR MALIGNANT NEOPLASM OF PROSTATE: ICD-10-CM

## 2025-08-12 ENCOUNTER — NON-APPOINTMENT (OUTPATIENT)
Age: 70
End: 2025-08-12

## 2025-08-13 ENCOUNTER — NON-APPOINTMENT (OUTPATIENT)
Age: 70
End: 2025-08-13

## 2025-08-29 ENCOUNTER — APPOINTMENT (OUTPATIENT)
Dept: PEDIATRIC ALLERGY IMMUNOLOGY | Facility: CLINIC | Age: 70
End: 2025-08-29
Payer: MEDICARE

## 2025-08-29 DIAGNOSIS — J45.40 MODERATE PERSISTENT ASTHMA, UNCOMPLICATED: ICD-10-CM

## 2025-08-29 PROCEDURE — 94010 BREATHING CAPACITY TEST: CPT

## 2025-08-29 PROCEDURE — 99213 OFFICE O/P EST LOW 20 MIN: CPT | Mod: 25

## 2025-09-12 ENCOUNTER — NON-APPOINTMENT (OUTPATIENT)
Age: 70
End: 2025-09-12

## 2025-09-12 ENCOUNTER — APPOINTMENT (OUTPATIENT)
Dept: CARDIOLOGY | Facility: CLINIC | Age: 70
End: 2025-09-12
Payer: MEDICARE

## 2025-09-12 VITALS
OXYGEN SATURATION: 97 % | SYSTOLIC BLOOD PRESSURE: 149 MMHG | BODY MASS INDEX: 29.33 KG/M2 | WEIGHT: 198 LBS | HEART RATE: 63 BPM | DIASTOLIC BLOOD PRESSURE: 76 MMHG | HEIGHT: 69 IN

## 2025-09-12 VITALS — SYSTOLIC BLOOD PRESSURE: 138 MMHG | DIASTOLIC BLOOD PRESSURE: 80 MMHG

## 2025-09-12 DIAGNOSIS — R07.89 OTHER CHEST PAIN: ICD-10-CM

## 2025-09-12 DIAGNOSIS — I25.10 ATHEROSCLEROTIC HEART DISEASE OF NATIVE CORONARY ARTERY W/OUT ANGINA PECTORIS: ICD-10-CM

## 2025-09-12 DIAGNOSIS — Z95.5 PRESENCE OF CORONARY ANGIOPLASTY IMPLANT AND GRAFT: ICD-10-CM

## 2025-09-12 DIAGNOSIS — R07.9 CHEST PAIN, UNSPECIFIED: ICD-10-CM

## 2025-09-12 PROCEDURE — 99214 OFFICE O/P EST MOD 30 MIN: CPT | Mod: 25

## 2025-09-12 PROCEDURE — 93000 ELECTROCARDIOGRAM COMPLETE: CPT

## 2025-09-12 RX ORDER — FLUTICASONE FUROATE, UMECLIDINIUM BROMIDE AND VILANTEROL TRIFENATATE 200; 62.5; 25 UG/1; UG/1; UG/1
POWDER RESPIRATORY (INHALATION)
Refills: 0 | Status: ACTIVE | COMMUNITY

## 2025-09-16 RX ORDER — FLUTICASONE FUROATE, UMECLIDINIUM BROMIDE AND VILANTEROL TRIFENATATE 200; 62.5; 25 UG/1; UG/1; UG/1
200-62.5-25 POWDER RESPIRATORY (INHALATION) DAILY
Qty: 1 | Refills: 0 | Status: ACTIVE | COMMUNITY
Start: 2025-09-16 | End: 1900-01-01